# Patient Record
Sex: FEMALE | Race: WHITE | HISPANIC OR LATINO | Employment: FULL TIME | URBAN - METROPOLITAN AREA
[De-identification: names, ages, dates, MRNs, and addresses within clinical notes are randomized per-mention and may not be internally consistent; named-entity substitution may affect disease eponyms.]

---

## 2017-01-04 ENCOUNTER — PRENATAL OFFICE VISIT (OUTPATIENT)
Dept: FAMILY MEDICINE | Facility: CLINIC | Age: 37
End: 2017-01-04
Payer: COMMERCIAL

## 2017-01-04 VITALS
SYSTOLIC BLOOD PRESSURE: 116 MMHG | OXYGEN SATURATION: 100 % | WEIGHT: 222.9 LBS | DIASTOLIC BLOOD PRESSURE: 62 MMHG | HEART RATE: 88 BPM | HEIGHT: 61 IN | BODY MASS INDEX: 42.09 KG/M2 | TEMPERATURE: 97.8 F

## 2017-01-04 DIAGNOSIS — Z34.93 NORMAL PREGNANCY, THIRD TRIMESTER: Primary | ICD-10-CM

## 2017-01-04 PROCEDURE — 99207 ZZC PRENATAL VISIT: CPT | Performed by: FAMILY MEDICINE

## 2017-01-04 NOTE — NURSING NOTE
"Chief Complaint   Patient presents with     Prenatal Care     /62 mmHg  Pulse 88  Temp(Src) 97.8  F (36.6  C) (Oral)  Ht 5' 1\" (1.549 m)  Wt 222 lb 14.4 oz (101.107 kg)  BMI 42.14 kg/m2  SpO2 100%  LMP 05/08/2016 (LMP Unknown) Estimated body mass index is 42.14 kg/(m^2) as calculated from the following:    Height as of this encounter: 5' 1\" (1.549 m).    Weight as of this encounter: 222 lb 14.4 oz (101.107 kg).  BP completed using cuff size: regular       Health Maintenance due pending provider review:  NONE    n/a    Dahlia Salas CMA    "

## 2017-01-04 NOTE — PROGRESS NOTES
34w3d  Pt doing well - no concerns  Few intermittent contractions but nothing regular.  No SROM or vaginal discharge.  Good fetal movement.  F/u 2 weeks with CNM - due for GBS at that visit  Pt interested in TOLAC -   PN

## 2017-01-16 ENCOUNTER — PRENATAL OFFICE VISIT (OUTPATIENT)
Dept: MIDWIFE SERVICES | Facility: CLINIC | Age: 37
End: 2017-01-16
Payer: COMMERCIAL

## 2017-01-16 VITALS
SYSTOLIC BLOOD PRESSURE: 116 MMHG | DIASTOLIC BLOOD PRESSURE: 71 MMHG | HEIGHT: 61 IN | HEART RATE: 85 BPM | TEMPERATURE: 97.8 F | BODY MASS INDEX: 42.67 KG/M2 | RESPIRATION RATE: 20 BRPM | WEIGHT: 226 LBS

## 2017-01-16 DIAGNOSIS — O26.893 RH NEGATIVE STATUS DURING PREGNANCY, THIRD TRIMESTER: ICD-10-CM

## 2017-01-16 DIAGNOSIS — Z67.91 RH NEGATIVE STATUS DURING PREGNANCY, THIRD TRIMESTER: ICD-10-CM

## 2017-01-16 DIAGNOSIS — Z34.83 OTHER NORMAL PREGNANCY, NOT FIRST, THIRD TRIMESTER: Primary | ICD-10-CM

## 2017-01-16 LAB — HGB BLD-MCNC: 10.9 G/DL (ref 11.7–15.7)

## 2017-01-16 PROCEDURE — 00000218 ZZHCL STATISTIC OBHBG - HEMOGLOBIN: Performed by: ADVANCED PRACTICE MIDWIFE

## 2017-01-16 PROCEDURE — 99207 ZZC PRENATAL VISIT: CPT | Performed by: ADVANCED PRACTICE MIDWIFE

## 2017-01-16 PROCEDURE — 36415 COLL VENOUS BLD VENIPUNCTURE: CPT | Performed by: ADVANCED PRACTICE MIDWIFE

## 2017-01-16 PROCEDURE — 87653 STREP B DNA AMP PROBE: CPT | Performed by: ADVANCED PRACTICE MIDWIFE

## 2017-01-16 NOTE — NURSING NOTE
"Chief Complaint   Patient presents with     Prenatal Care       Initial /71 mmHg  Pulse 85  Temp(Src) 97.8  F (36.6  C)  Resp 20  Ht 5' 1\" (1.549 m)  Wt 226 lb (102.513 kg)  BMI 42.72 kg/m2  LMP 05/08/2016 (LMP Unknown) Estimated body mass index is 42.72 kg/(m^2) as calculated from the following:    Height as of this encounter: 5' 1\" (1.549 m).    Weight as of this encounter: 226 lb (102.513 kg).  BP completed using cuff size: large    "

## 2017-01-16 NOTE — PROGRESS NOTES
36w1d  GBS and hgb today. Feels good. Reports good fetal movement. Denies leaking of fluid, vaginal bleeding, regular uterine contractions, headache or other concerns.  Has repeat  planned at 41 wks. Hoping to labor prior to that and have vaginal birth. RTC in 1 wk [facility]

## 2017-01-17 ENCOUNTER — MYC MEDICAL ADVICE (OUTPATIENT)
Dept: FAMILY MEDICINE | Facility: CLINIC | Age: 37
End: 2017-01-17

## 2017-01-17 DIAGNOSIS — Z78.9 EXCLUSIVELY BREASTFEED INFANT: Primary | ICD-10-CM

## 2017-01-17 LAB
GP B STREP DNA SPEC QL NAA+PROBE: NORMAL
SPECIMEN SOURCE: NORMAL

## 2017-01-17 NOTE — PROGRESS NOTES
Quick Note:    Dear Ramya,    Your test results are attached below. Your GBS test was negative for infection this time around, which means you do not need antibiotics in labor. However, you will still need an IV because of her previous  birth.      Your hemoglobin test shows that your level is low, but just a little (normal is 11.0 and yours is 10.9). This means that you have anemia or low iron. Some common symptoms of anemia include feeling tired, being intolerant to activities that you once did easily, feeling short of breath with activity or feeling like your heart is beating faster than normal. It is important to try to increase this level before the birth of your baby.     We recommend eating more foods that are rich in iron. You can look at your food labels to see which foods have the most iron in them and eat more of these foods. Foods that are typically high in iron include red meats, dark green leafy vegetables, beans, and fortified cereals. Cream of wheat hot cereal actually has one of the highest iron contents of any food. If you are unable to increase your hemoglobin with diet or if your level is very low we will recommend that you take an iron supplement every day. You can buy these at any pharmacy or drug store.   If you have any questions, please contact me via [employer] or you can call our office at [phone].    Nanda Amaro CNM, CLARA  ______

## 2017-01-18 NOTE — TELEPHONE ENCOUNTER
PN,  Please see below- sorry i am not sure which order to pended or where to send it.  Please advise.  Thanks,  Destinee Méndez RN

## 2017-01-23 ENCOUNTER — PRENATAL OFFICE VISIT (OUTPATIENT)
Dept: MIDWIFE SERVICES | Facility: CLINIC | Age: 37
End: 2017-01-23
Payer: COMMERCIAL

## 2017-01-23 VITALS
TEMPERATURE: 98.2 F | HEART RATE: 97 BPM | BODY MASS INDEX: 43.05 KG/M2 | HEIGHT: 61 IN | SYSTOLIC BLOOD PRESSURE: 114 MMHG | RESPIRATION RATE: 20 BRPM | DIASTOLIC BLOOD PRESSURE: 77 MMHG | WEIGHT: 228 LBS

## 2017-01-23 DIAGNOSIS — Z34.93 NORMAL PREGNANCY, THIRD TRIMESTER: Primary | ICD-10-CM

## 2017-01-23 PROCEDURE — 99207 ZZC PRENATAL VISIT: CPT | Performed by: ADVANCED PRACTICE MIDWIFE

## 2017-01-23 NOTE — PROGRESS NOTES
37w1d  Feeling well. No questions or concerns. Reports good fetal movement. Denies leaking of fluid, vaginal bleeding, regular uterine contractions, headache or other concerns.  RTC in 1 wk [facility]

## 2017-01-23 NOTE — NURSING NOTE
"Chief Complaint   Patient presents with     Prenatal Care       Initial /77 mmHg  Pulse 97  Temp(Src) 98.2  F (36.8  C)  Resp 20  Ht 5' 1\" (1.549 m)  Wt 228 lb (103.42 kg)  BMI 43.10 kg/m2  LMP 05/08/2016 (LMP Unknown) Estimated body mass index is 43.1 kg/(m^2) as calculated from the following:    Height as of this encounter: 5' 1\" (1.549 m).    Weight as of this encounter: 228 lb (103.42 kg).  BP completed using cuff size: large    "

## 2017-01-30 ENCOUNTER — PRENATAL OFFICE VISIT (OUTPATIENT)
Dept: MIDWIFE SERVICES | Facility: CLINIC | Age: 37
End: 2017-01-30
Payer: COMMERCIAL

## 2017-01-30 VITALS
HEART RATE: 100 BPM | TEMPERATURE: 97.8 F | BODY MASS INDEX: 43.35 KG/M2 | SYSTOLIC BLOOD PRESSURE: 130 MMHG | WEIGHT: 229.3 LBS | DIASTOLIC BLOOD PRESSURE: 88 MMHG

## 2017-01-30 DIAGNOSIS — Z98.891 S/P CESAREAN SECTION: ICD-10-CM

## 2017-01-30 DIAGNOSIS — Z34.93 NORMAL PREGNANCY, THIRD TRIMESTER: ICD-10-CM

## 2017-01-30 DIAGNOSIS — E66.09 NON MORBID OBESITY DUE TO EXCESS CALORIES: Primary | ICD-10-CM

## 2017-01-30 PROCEDURE — 99207 ZZC PRENATAL VISIT: CPT | Performed by: ADVANCED PRACTICE MIDWIFE

## 2017-01-30 NOTE — PROGRESS NOTES
Feeling well.  Baby is active. Denies any leaking of fluid, vaginal bleeding, regular uterine contractions, or headaches or other concerns.   Cervical exam done by consent.    Presentation confirmed with BSUS.  Back on on maternal left.  FHR heard the strongest above maternal umbilicus.    Reviewed to call [phone] for contractions, loss of fluid, vaginal bleeding, decreased fetal movement or any other questions or concerns.    RTC in 1 weeks.  Pooja Malcolm, KEERTHI, APRN, CNM

## 2017-02-06 ENCOUNTER — PRENATAL OFFICE VISIT (OUTPATIENT)
Dept: MIDWIFE SERVICES | Facility: CLINIC | Age: 37
End: 2017-02-06
Payer: COMMERCIAL

## 2017-02-06 VITALS
HEART RATE: 130 BPM | WEIGHT: 228 LBS | BODY MASS INDEX: 43.1 KG/M2 | DIASTOLIC BLOOD PRESSURE: 80 MMHG | SYSTOLIC BLOOD PRESSURE: 118 MMHG

## 2017-02-06 DIAGNOSIS — Z34.93 NORMAL PREGNANCY, THIRD TRIMESTER: ICD-10-CM

## 2017-02-06 DIAGNOSIS — Z34.83 OTHER NORMAL PREGNANCY, NOT FIRST, THIRD TRIMESTER: Primary | ICD-10-CM

## 2017-02-06 PROCEDURE — 99207 ZZC PRENATAL VISIT: CPT | Performed by: ADVANCED PRACTICE MIDWIFE

## 2017-02-06 NOTE — PROGRESS NOTES
39w1d  Feeling well. Really wants to labor. Pleased with cervical change over the last week. Reports good fetal movement. Denies leaking of fluid, vaginal bleeding, regular uterine contractions, headache or other concerns.  RTC in 1 wk [facility]

## 2017-02-11 ENCOUNTER — ANESTHESIA (OUTPATIENT)
Dept: OBGYN | Facility: CLINIC | Age: 37
End: 2017-02-11
Payer: COMMERCIAL

## 2017-02-11 ENCOUNTER — HOSPITAL ENCOUNTER (INPATIENT)
Facility: CLINIC | Age: 37
LOS: 2 days | Discharge: HOME | End: 2017-02-13
Attending: ADVANCED PRACTICE MIDWIFE | Admitting: OBSTETRICS & GYNECOLOGY
Payer: COMMERCIAL

## 2017-02-11 ENCOUNTER — ANESTHESIA EVENT (OUTPATIENT)
Dept: OBGYN | Facility: CLINIC | Age: 37
End: 2017-02-11
Payer: COMMERCIAL

## 2017-02-11 DIAGNOSIS — Z98.891 S/P CESAREAN SECTION: Primary | ICD-10-CM

## 2017-02-11 PROBLEM — Z36.89 ENCOUNTER FOR TRIAGE IN PREGNANT PATIENT: Status: RESOLVED | Noted: 2017-02-11 | Resolved: 2017-02-11

## 2017-02-11 PROBLEM — Z36.89 ENCOUNTER FOR TRIAGE IN PREGNANT PATIENT: Status: ACTIVE | Noted: 2017-02-11

## 2017-02-11 LAB
ALBUMIN UR-MCNC: 10 MG/DL
APPEARANCE UR: CLEAR
BASOPHILS # BLD AUTO: 0 10E9/L (ref 0–0.2)
BASOPHILS NFR BLD AUTO: 0.1 %
BILIRUB UR QL STRIP: NEGATIVE
COLOR UR AUTO: YELLOW
DIFFERENTIAL METHOD BLD: ABNORMAL
EOSINOPHIL # BLD AUTO: 0.1 10E9/L (ref 0–0.7)
EOSINOPHIL NFR BLD AUTO: 0.3 %
ERYTHROCYTE [DISTWIDTH] IN BLOOD BY AUTOMATED COUNT: 14.8 % (ref 10–15)
GLUCOSE UR STRIP-MCNC: NEGATIVE MG/DL
HCT VFR BLD AUTO: 34.7 % (ref 35–47)
HGB BLD-MCNC: 11.2 G/DL (ref 11.7–15.7)
HGB UR QL STRIP: NEGATIVE
IMM GRANULOCYTES # BLD: 0.1 10E9/L (ref 0–0.4)
IMM GRANULOCYTES NFR BLD: 0.6 %
KETONES UR STRIP-MCNC: NEGATIVE MG/DL
LEUKOCYTE ESTERASE UR QL STRIP: NEGATIVE
LYMPHOCYTES # BLD AUTO: 2.4 10E9/L (ref 0.8–5.3)
LYMPHOCYTES NFR BLD AUTO: 13.5 %
MCH RBC QN AUTO: 25.9 PG (ref 26.5–33)
MCHC RBC AUTO-ENTMCNC: 32.3 G/DL (ref 31.5–36.5)
MCV RBC AUTO: 80 FL (ref 78–100)
MONOCYTES # BLD AUTO: 0.8 10E9/L (ref 0–1.3)
MONOCYTES NFR BLD AUTO: 4.6 %
MUCOUS THREADS #/AREA URNS LPF: PRESENT /LPF
NEUTROPHILS # BLD AUTO: 14.6 10E9/L (ref 1.6–8.3)
NEUTROPHILS NFR BLD AUTO: 80.9 %
NITRATE UR QL: NEGATIVE
NRBC # BLD AUTO: 0 10*3/UL
NRBC BLD AUTO-RTO: 0 /100
PH UR STRIP: 6.5 PH (ref 5–7)
PLATELET # BLD AUTO: 259 10E9/L (ref 150–450)
RBC # BLD AUTO: 4.33 10E12/L (ref 3.8–5.2)
RBC #/AREA URNS AUTO: <1 /HPF (ref 0–2)
SP GR UR STRIP: 1.01 (ref 1–1.03)
SQUAMOUS #/AREA URNS AUTO: 1 /HPF (ref 0–1)
T PALLIDUM IGG+IGM SER QL: NORMAL
URN SPEC COLLECT METH UR: ABNORMAL
UROBILINOGEN UR STRIP-MCNC: NORMAL MG/DL (ref 0–2)
WBC # BLD AUTO: 18 10E9/L (ref 4–11)
WBC #/AREA URNS AUTO: 1 /HPF (ref 0–2)

## 2017-02-11 PROCEDURE — 86850 RBC ANTIBODY SCREEN: CPT | Performed by: ADVANCED PRACTICE MIDWIFE

## 2017-02-11 PROCEDURE — 27210794 ZZH OR GENERAL SUPPLY STERILE: Performed by: OBSTETRICS & GYNECOLOGY

## 2017-02-11 PROCEDURE — 71000014 ZZH RECOVERY PHASE 1 LEVEL 2 FIRST HR: Performed by: OBSTETRICS & GYNECOLOGY

## 2017-02-11 PROCEDURE — 40000170 ZZH STATISTIC PRE-PROCEDURE ASSESSMENT II: Performed by: OBSTETRICS & GYNECOLOGY

## 2017-02-11 PROCEDURE — 36000057 ZZH SURGERY LEVEL 3 1ST 30 MIN - UMMC: Performed by: OBSTETRICS & GYNECOLOGY

## 2017-02-11 PROCEDURE — 37000008 ZZH ANESTHESIA TECHNICAL FEE, 1ST 30 MIN: Performed by: OBSTETRICS & GYNECOLOGY

## 2017-02-11 PROCEDURE — 25000128 H RX IP 250 OP 636: Performed by: OBSTETRICS & GYNECOLOGY

## 2017-02-11 PROCEDURE — 99215 OFFICE O/P EST HI 40 MIN: CPT | Mod: 25

## 2017-02-11 PROCEDURE — 25000128 H RX IP 250 OP 636: Performed by: NURSE ANESTHETIST, CERTIFIED REGISTERED

## 2017-02-11 PROCEDURE — 25000132 ZZH RX MED GY IP 250 OP 250 PS 637: Performed by: OBSTETRICS & GYNECOLOGY

## 2017-02-11 PROCEDURE — 25000128 H RX IP 250 OP 636: Performed by: ADVANCED PRACTICE MIDWIFE

## 2017-02-11 PROCEDURE — 25000125 ZZHC RX 250: Performed by: ANESTHESIOLOGY

## 2017-02-11 PROCEDURE — C1765 ADHESION BARRIER: HCPCS | Performed by: OBSTETRICS & GYNECOLOGY

## 2017-02-11 PROCEDURE — 25000125 ZZHC RX 250: Performed by: NURSE ANESTHETIST, CERTIFIED REGISTERED

## 2017-02-11 PROCEDURE — 86870 RBC ANTIBODY IDENTIFICATION: CPT | Performed by: ADVANCED PRACTICE MIDWIFE

## 2017-02-11 PROCEDURE — 25000125 ZZHC RX 250

## 2017-02-11 PROCEDURE — 40000010 ZZH STATISTIC ANES STAT CODE-CRNA PER MINUTE: Performed by: OBSTETRICS & GYNECOLOGY

## 2017-02-11 PROCEDURE — 85025 COMPLETE CBC W/AUTO DIFF WBC: CPT | Performed by: ADVANCED PRACTICE MIDWIFE

## 2017-02-11 PROCEDURE — 86901 BLOOD TYPING SEROLOGIC RH(D): CPT | Performed by: ADVANCED PRACTICE MIDWIFE

## 2017-02-11 PROCEDURE — 59025 FETAL NON-STRESS TEST: CPT

## 2017-02-11 PROCEDURE — 71000015 ZZH RECOVERY PHASE 1 LEVEL 2 EA ADDTL HR: Performed by: OBSTETRICS & GYNECOLOGY

## 2017-02-11 PROCEDURE — 37000009 ZZH ANESTHESIA TECHNICAL FEE, EACH ADDTL 15 MIN: Performed by: OBSTETRICS & GYNECOLOGY

## 2017-02-11 PROCEDURE — 27110028 ZZH OR GENERAL SUPPLY NON-STERILE: Performed by: OBSTETRICS & GYNECOLOGY

## 2017-02-11 PROCEDURE — 86900 BLOOD TYPING SEROLOGIC ABO: CPT | Performed by: ADVANCED PRACTICE MIDWIFE

## 2017-02-11 PROCEDURE — 25000125 ZZHC RX 250: Performed by: ADVANCED PRACTICE MIDWIFE

## 2017-02-11 PROCEDURE — 12000030 ZZH R&B OB INTERMEDIATE UMMC

## 2017-02-11 PROCEDURE — 25800025 ZZH RX 258: Performed by: ADVANCED PRACTICE MIDWIFE

## 2017-02-11 PROCEDURE — 81001 URINALYSIS AUTO W/SCOPE: CPT | Performed by: ADVANCED PRACTICE MIDWIFE

## 2017-02-11 PROCEDURE — 86780 TREPONEMA PALLIDUM: CPT | Performed by: ADVANCED PRACTICE MIDWIFE

## 2017-02-11 PROCEDURE — 36000059 ZZH SURGERY LEVEL 3 EA 15 ADDTL MIN UMMC: Performed by: OBSTETRICS & GYNECOLOGY

## 2017-02-11 RX ORDER — NALBUPHINE HYDROCHLORIDE 10 MG/ML
2.5-5 INJECTION, SOLUTION INTRAMUSCULAR; INTRAVENOUS; SUBCUTANEOUS EVERY 6 HOURS PRN
Status: DISCONTINUED | OUTPATIENT
Start: 2017-02-11 | End: 2017-02-12

## 2017-02-11 RX ORDER — CEFAZOLIN SODIUM 2 G/100ML
2 INJECTION, SOLUTION INTRAVENOUS
Status: DISCONTINUED | OUTPATIENT
Start: 2017-02-11 | End: 2017-02-11 | Stop reason: HOSPADM

## 2017-02-11 RX ORDER — SODIUM CHLORIDE, SODIUM LACTATE, POTASSIUM CHLORIDE, CALCIUM CHLORIDE 600; 310; 30; 20 MG/100ML; MG/100ML; MG/100ML; MG/100ML
INJECTION, SOLUTION INTRAVENOUS CONTINUOUS
Status: DISCONTINUED | OUTPATIENT
Start: 2017-02-11 | End: 2017-02-11 | Stop reason: HOSPADM

## 2017-02-11 RX ORDER — EPHEDRINE SULFATE 50 MG/ML
5 INJECTION, SOLUTION INTRAMUSCULAR; INTRAVENOUS; SUBCUTANEOUS
Status: DISCONTINUED | OUTPATIENT
Start: 2017-02-11 | End: 2017-02-12

## 2017-02-11 RX ORDER — METHYLERGONOVINE MALEATE 0.2 MG/ML
200 INJECTION INTRAVENOUS
Status: DISCONTINUED | OUTPATIENT
Start: 2017-02-11 | End: 2017-02-12

## 2017-02-11 RX ORDER — FENTANYL CITRATE 50 UG/ML
25-50 INJECTION, SOLUTION INTRAMUSCULAR; INTRAVENOUS
Status: DISCONTINUED | OUTPATIENT
Start: 2017-02-11 | End: 2017-02-11 | Stop reason: HOSPADM

## 2017-02-11 RX ORDER — CEFAZOLIN SODIUM 1 G/3ML
1 INJECTION, POWDER, FOR SOLUTION INTRAMUSCULAR; INTRAVENOUS
Status: DISCONTINUED | OUTPATIENT
Start: 2017-02-11 | End: 2017-02-11 | Stop reason: HOSPADM

## 2017-02-11 RX ORDER — BUPIVACAINE HYDROCHLORIDE 7.5 MG/ML
INJECTION, SOLUTION INTRASPINAL PRN
Status: DISCONTINUED | OUTPATIENT
Start: 2017-02-11 | End: 2017-02-11

## 2017-02-11 RX ORDER — NALOXONE HYDROCHLORIDE 0.4 MG/ML
.1-.4 INJECTION, SOLUTION INTRAMUSCULAR; INTRAVENOUS; SUBCUTANEOUS
Status: DISCONTINUED | OUTPATIENT
Start: 2017-02-11 | End: 2017-02-11

## 2017-02-11 RX ORDER — CARBOPROST TROMETHAMINE 250 UG/ML
250 INJECTION, SOLUTION INTRAMUSCULAR
Status: DISCONTINUED | OUTPATIENT
Start: 2017-02-11 | End: 2017-02-12

## 2017-02-11 RX ORDER — ONDANSETRON 4 MG/1
4 TABLET, ORALLY DISINTEGRATING ORAL EVERY 30 MIN PRN
Status: DISCONTINUED | OUTPATIENT
Start: 2017-02-11 | End: 2017-02-11 | Stop reason: HOSPADM

## 2017-02-11 RX ORDER — OXYTOCIN/0.9 % SODIUM CHLORIDE 30/500 ML
PLASTIC BAG, INJECTION (ML) INTRAVENOUS
Status: DISCONTINUED
Start: 2017-02-11 | End: 2017-02-11 | Stop reason: WASHOUT

## 2017-02-11 RX ORDER — OXYTOCIN 10 [USP'U]/ML
INJECTION, SOLUTION INTRAMUSCULAR; INTRAVENOUS
Status: DISCONTINUED
Start: 2017-02-11 | End: 2017-02-11 | Stop reason: HOSPADM

## 2017-02-11 RX ORDER — OXYTOCIN/0.9 % SODIUM CHLORIDE 30/500 ML
PLASTIC BAG, INJECTION (ML) INTRAVENOUS
Status: DISCONTINUED
Start: 2017-02-11 | End: 2017-02-11 | Stop reason: HOSPADM

## 2017-02-11 RX ORDER — NALOXONE HYDROCHLORIDE 0.4 MG/ML
.1-.4 INJECTION, SOLUTION INTRAMUSCULAR; INTRAVENOUS; SUBCUTANEOUS
Status: DISCONTINUED | OUTPATIENT
Start: 2017-02-11 | End: 2017-02-12

## 2017-02-11 RX ORDER — ONDANSETRON 2 MG/ML
4 INJECTION INTRAMUSCULAR; INTRAVENOUS EVERY 6 HOURS PRN
Status: DISCONTINUED | OUTPATIENT
Start: 2017-02-11 | End: 2017-02-12

## 2017-02-11 RX ORDER — OXYCODONE AND ACETAMINOPHEN 5; 325 MG/1; MG/1
1 TABLET ORAL
Status: DISCONTINUED | OUTPATIENT
Start: 2017-02-11 | End: 2017-02-12

## 2017-02-11 RX ORDER — LIDOCAINE HYDROCHLORIDE AND EPINEPHRINE 15; 5 MG/ML; UG/ML
INJECTION, SOLUTION EPIDURAL PRN
Status: DISCONTINUED | OUTPATIENT
Start: 2017-02-11 | End: 2017-02-11

## 2017-02-11 RX ORDER — OXYTOCIN 10 [USP'U]/ML
10 INJECTION, SOLUTION INTRAMUSCULAR; INTRAVENOUS
Status: DISCONTINUED | OUTPATIENT
Start: 2017-02-11 | End: 2017-02-12

## 2017-02-11 RX ORDER — IBUPROFEN 800 MG/1
800 TABLET, FILM COATED ORAL
Status: DISCONTINUED | OUTPATIENT
Start: 2017-02-11 | End: 2017-02-12

## 2017-02-11 RX ORDER — ACETAMINOPHEN 325 MG/1
650 TABLET ORAL EVERY 4 HOURS PRN
Status: DISCONTINUED | OUTPATIENT
Start: 2017-02-11 | End: 2017-02-12

## 2017-02-11 RX ORDER — LIDOCAINE 40 MG/G
CREAM TOPICAL
Status: DISCONTINUED | OUTPATIENT
Start: 2017-02-11 | End: 2017-02-12

## 2017-02-11 RX ORDER — HYDROMORPHONE HYDROCHLORIDE 1 MG/ML
.3-.5 INJECTION, SOLUTION INTRAMUSCULAR; INTRAVENOUS; SUBCUTANEOUS EVERY 5 MIN PRN
Status: DISCONTINUED | OUTPATIENT
Start: 2017-02-11 | End: 2017-02-11 | Stop reason: HOSPADM

## 2017-02-11 RX ORDER — KETOROLAC TROMETHAMINE 30 MG/ML
INJECTION, SOLUTION INTRAMUSCULAR; INTRAVENOUS PRN
Status: DISCONTINUED | OUTPATIENT
Start: 2017-02-11 | End: 2017-02-11

## 2017-02-11 RX ORDER — SODIUM CHLORIDE, SODIUM LACTATE, POTASSIUM CHLORIDE, CALCIUM CHLORIDE 600; 310; 30; 20 MG/100ML; MG/100ML; MG/100ML; MG/100ML
INJECTION, SOLUTION INTRAVENOUS CONTINUOUS
Status: DISCONTINUED | OUTPATIENT
Start: 2017-02-11 | End: 2017-02-12

## 2017-02-11 RX ORDER — OXYTOCIN/0.9 % SODIUM CHLORIDE 30/500 ML
100-340 PLASTIC BAG, INJECTION (ML) INTRAVENOUS CONTINUOUS PRN
Status: COMPLETED | OUTPATIENT
Start: 2017-02-11 | End: 2017-02-11

## 2017-02-11 RX ORDER — ONDANSETRON 2 MG/ML
4 INJECTION INTRAMUSCULAR; INTRAVENOUS EVERY 30 MIN PRN
Status: DISCONTINUED | OUTPATIENT
Start: 2017-02-11 | End: 2017-02-11 | Stop reason: HOSPADM

## 2017-02-11 RX ADMIN — KETOROLAC TROMETHAMINE 30 MG: 30 INJECTION, SOLUTION INTRAMUSCULAR at 21:08

## 2017-02-11 RX ADMIN — SODIUM CHLORIDE, POTASSIUM CHLORIDE, SODIUM LACTATE AND CALCIUM CHLORIDE: 600; 310; 30; 20 INJECTION, SOLUTION INTRAVENOUS at 08:49

## 2017-02-11 RX ADMIN — CEFAZOLIN 2 G: 1 INJECTION, POWDER, FOR SOLUTION INTRAMUSCULAR; INTRAVENOUS at 20:19

## 2017-02-11 RX ADMIN — LIDOCAINE HYDROCHLORIDE,EPINEPHRINE BITARTRATE 3 ML: 15; .005 INJECTION, SOLUTION EPIDURAL; INFILTRATION; INTRACAUDAL; PERINEURAL at 07:15

## 2017-02-11 RX ADMIN — PHENYLEPHRINE HYDROCHLORIDE 100 MCG: 10 INJECTION, SOLUTION INTRAMUSCULAR; INTRAVENOUS; SUBCUTANEOUS at 20:22

## 2017-02-11 RX ADMIN — ONDANSETRON 4 MG: 2 INJECTION INTRAMUSCULAR; INTRAVENOUS at 20:16

## 2017-02-11 RX ADMIN — OXYTOCIN-SODIUM CHLORIDE 0.9% IV SOLN 30 UNIT/500ML 300 ML/HR: 30-0.9/5 SOLUTION at 20:43

## 2017-02-11 RX ADMIN — Medication 0.3 MCG/KG/MIN: at 20:23

## 2017-02-11 RX ADMIN — SODIUM CHLORIDE, POTASSIUM CHLORIDE, SODIUM LACTATE AND CALCIUM CHLORIDE 500 ML: 600; 310; 30; 20 INJECTION, SOLUTION INTRAVENOUS at 16:25

## 2017-02-11 RX ADMIN — SODIUM CITRATE AND CITRIC ACID MONOHYDRATE 30 ML: 500; 334 SOLUTION ORAL at 18:44

## 2017-02-11 RX ADMIN — Medication 8 ML: at 07:19

## 2017-02-11 RX ADMIN — BUPIVACAINE HYDROCHLORIDE IN DEXTROSE 1.6 ML: 7.5 INJECTION, SOLUTION SUBARACHNOID at 20:20

## 2017-02-11 RX ADMIN — SODIUM CHLORIDE, POTASSIUM CHLORIDE, SODIUM LACTATE AND CALCIUM CHLORIDE 500 ML: 600; 310; 30; 20 INJECTION, SOLUTION INTRAVENOUS at 06:52

## 2017-02-11 NOTE — IP AVS SNAPSHOT
MRN:[id number]                      After Visit Summary   2/11/2017    Ramya BALBUENA    MRN: [id number]           Thank you!     Thank you for choosing [facility] for your care. Our goal is always to provide you with excellent care. Hearing back from our patients is one way we can continue to improve our services. Please take a few minutes to complete the written survey that you may receive in the mail after you visit with us. Thank you!        Patient Information     Date Of Birth          1980        About your hospital stay     You were [facility] on:  February 11[facility] 2017 [facility]    You were discharged on:  February 13, 2017        Reason for your hospital stay       You were admitted to the hospital for the birth of your baby and postpartum care.                  Who to Call     For medical emergencies, please call 911.  For non-urgent questions about your medical care, please call your primary care provider or clinic, [phone]  For questions related to your surgery, please call your surgery clinic        Attending Provider     Provider Specialty    Pooja Malcolm, CLARA Midwives    Deyanira Moreno APRN CN Midwives    Diane Anderson MD OB/Gyn       Primary Care Provider Office Phone # Fax #    Naida CANNON DO Lexi [phone]       [address] [address]        After Care Instructions     Activity       Discharge activity:  No intercourse and nothing in the vagina for 6 weeks  Continue walking and moving around frequently, increase activity as tolerated. No strenuous exercise for 6 weeks.   No driving for 2 weeks or until you can slam on the brakes with minimal discomfort. No driving while on narcotic pain medication.  Continue stool softeners while taking narcotic pain medications.            Diet       Regular                  Follow-up Appointments     [facility] Follow-up  and recommended labs and tests       Schedule postpartum visit with your provider and return to clinic in 6 weeks.                  Further instructions from your care team       Postop  Birth Instructions    Activity       Do not lift more than 10 pounds for 6 weeks after surgery.  Ask family and friends for help when you need it.    No driving until you have stopped taking your pain medications (usually two weeks after surgery).    No heavy exercise or activity for 6 weeks.  Don't do anything that will put a strain on your surgery site.    Don't strain when using the toilet.  Your care team may prescribe a stool softener if you have problems with your bowel movements.     To care for your incision:       Keep the incision clean and dry.    Do not soak your incision in water. No swimming or hot tubs until it has fully healed. You may soak in the bathtub if the water level is below your incision.    Do not use peroxide, gel, cream, lotion, or ointment on your incision.    Adjust your clothes to avoid pressure on your surgery site (check the elastic in your underwear for example).     You may see a small amount of clear or pink drainage and this is normal.  Check with your health care provider:       If the drainage increases or has an odor.    If the incision reddens, you have swelling, or develop a rash.    If you have increased pain and the medicine we prescribed doesn't help.    If you have a fever above 100.4 F (38 C) with or without chills when placing thermometer under your tongue.   The area around your incision (surgery wound), will feel numb.  This is normal. The numbness should go away in less than a year.     Keep your hands clean:  Always wash your hands before touching your incision (surgery wound). This helps reduce your risk of infection. If your hands aren't dirty, you may use an alcohol hand-rub to clean your hands. Keep your nails clean and short.    Call your healthcare provider if you  "have any of these symptoms:       You soak a sanitary pad with blood within 1 hour, or you see blood clots larger than a golf ball.    Bleeding that lasts more than 6 weeks.    Vaginal discharge that smells bad.    Severe pain, cramping or tenderness in your lower belly area.    A need to urinate more frequently (use the toilet more often), more urgently (use the toilet very quickly), or it burns when you urinate.    Nausea and vomiting.    Redness, swelling or pain around a vein in your leg.    Problems breastfeeding or a red or painful area on your breast.    Chest pain and cough or are gasping for air.    Problems with coping with sadness, anxiety or depression. If you have concerns about hurting yourself or the baby, call your provider immediately.      You have questions or concerns after you return home.                  Pending Results     Date and Time Order Name Status Description    2/12/2017 0630 Rh Immune Globulin Study In process             Statement of Approval     Ordered          02/13/17 1241  I have reviewed and agree with all the recommendations and orders detailed in this document.  EFFECTIVE NOW     Approved and electronically signed by:  Yadi Ricci MD             Admission Information     Date & Time Provider Department Dept. Phone    2/11/2017 Diane Anderson MD [facility] [phone]      Your Vitals Were     Blood Pressure Pulse Temperature Respirations Height Weight    112/70 78 98  F (36.7  C) (Oral) 18 1.549 m (5' 1\") 103.4 kg (228 lb)    Last Period Pulse Oximetry BMI (Body Mass Index)             05/08/2016 (LMP Unknown) 99% 43.08 kg/m2         [facility] Information     [email or web address] gives you secure access to your electronic health record. If you see a primary care provider, you can also send messages to your care team and make appointments. If you have questions, please call your primary care clinic.  If you do not have a primary care provider, please call [phone] " and they will assist you.        Care EveryWhere ID     This is your Care EveryWhere ID. This could be used by other organizations to access your [facility] medical records  [id number]           Review of your medicines      START taking        Dose / Directions    oxyCODONE 5 MG IR tablet   Commonly known as:  ROXICODONE        Dose:  5-10 mg   Take 1-2 tablets (5-10 mg) by mouth every 4 hours as needed for moderate to severe pain   Quantity:  30 tablet   Refills:  0       senna-docusate 8.6-50 MG per tablet   Commonly known as:  SENOKOT-S;PERICOLACE        Dose:  1-2 tablet   Take 1-2 tablets by mouth 2 times daily   Quantity:  100 tablet   Refills:  0       simethicone 80 MG chewable tablet   Commonly known as:  MYLICON        Dose:  80 mg   Take 1 tablet (80 mg) by mouth 4 times daily as needed for other (gas)   Quantity:  60 tablet   Refills:  0         CONTINUE these medicines which have NOT CHANGED        Dose / Directions    order for DME   Used for:  Exclusively breastfeed infant        Equipment being ordered: double electric breast pump   Quantity:  1 Device   Refills:  0       PRENATAL VITAMINS PO        Refills:  0         STOP taking     SUDAFED PO           TYLENOL PO                Where to get your medicines      These medications were sent to [facility] [place], [place] - [address]  [address] [address]     Phone:  [phone]     senna-docusate 8.6-50 MG per tablet    simethicone 80 MG chewable tablet         Some of these will need a paper prescription and others can be bought over the counter. Ask your nurse if you have questions.     Bring a paper prescription for each of these medications     oxyCODONE 5 MG IR tablet                Protect others around you: Learn how to safely use, store and throw away your medicines at www.disposemymeds.org.             Medication List: This is a list of all your medications and when to take them. Check marks  below indicate your daily home schedule. Keep this list as a reference.      Medications           Morning Afternoon Evening Bedtime As Needed    order for DME   Equipment being ordered: double electric breast pump                                oxyCODONE 5 MG IR tablet   Commonly known as:  ROXICODONE   Take 1-2 tablets (5-10 mg) by mouth every 4 hours as needed for moderate to severe pain                                PRENATAL VITAMINS PO                                senna-docusate 8.6-50 MG per tablet   Commonly known as:  SENOKOT-S;PERICOLACE   Take 1-2 tablets by mouth 2 times daily   Last time this was given:  2 tablets on 2/13/2017  8:50 AM                                simethicone 80 MG chewable tablet   Commonly known as:  MYLICON   Take 1 tablet (80 mg) by mouth 4 times daily as needed for other (gas)   Last time this was given:  80 mg on 2/13/2017  8:51 AM

## 2017-02-11 NOTE — ANESTHESIA PREPROCEDURE EVALUATION
Anesthesia Evaluation       history and physical reviewed .      No history of anesthetic complications     ROS/MED HX    ENT/Pulmonary:  - neg pulmonary ROS     Neurologic:  - neg neurologic ROS     Cardiovascular:  - neg cardiovascular ROS       METS/Exercise Tolerance:     Hematologic:         Musculoskeletal:         GI/Hepatic:  - neg GI/hepatic ROS       Renal/Genitourinary:         Endo:     (+) Obesity, .      Psychiatric:         Infectious Disease:         Malignancy:         Other:               Physical Exam  Normal systems: dental    Airway   Mallampati: II  TM distance: > 3 FB  Neck ROM: full  Mouth opening: > 3 cm    Dental     Cardiovascular       Pulmonary           neg OB ROS                 HPI: Ramya BALBUENA is a  36 year old female with no significant PMH now with an IUP at 39w6d complicated by failed TOLAC, arrest of decent.     History and physical reviewed; no interval change.    Procedure: Procedure(s):   - Wound Class: I-Clean     NPO Status:   Adequate. > 6 hours.     PMHx/PSHx/ROS:  Past Medical History   Diagnosis Date     NO ACTIVE PROBLEMS      LSIL (low grade squamous intraepithelial lesion) on Pap smear 2011     colp - WNl     Abnormal Pap smear             Past Surgical History   Procedure Laterality Date     Hc exc lesion of tongue w/o closure       benign      section N/A 2014     Procedure:  SECTION;  Surgeon: Maribel Nixon MD;  Location: [facility] L+D           Anesthesia Plan      History & Physical Review      ASA Status:  2 .  OB Epidural Asa: 2       Plan for Spinal and Periph. Nerve Block for postop pain   PONV prophylaxis:  Ondansetron (or other 5HT-3)       Postoperative Care  Postoperative pain management:  IV analgesics, Peripheral nerve block (Single Shot) and Oral pain medications.      Consents  Anesthetic plan, risks, benefits and alternatives discussed with:  Patient..

## 2017-02-11 NOTE — IP AVS SNAPSHOT
[facility]    [address] [address]    Phone:  [phone]                                       After Visit Summary   2/11/2017    Ramya BALBUENA    MRN: [id number]           After Visit Summary Signature Page     I have received my discharge instructions, and my questions have been answered. I have discussed any challenges I see with this plan with the nurse or doctor.    ..........................................................................................................................................  Patient/Patient Representative Signature      ..........................................................................................................................................  Patient Representative Print Name and Relationship to Patient    ..................................................               ................................................  Date                                            Time    ..........................................................................................................................................  Reviewed by Signature/Title    ...................................................              ..............................................  Date                                                            Time

## 2017-02-11 NOTE — PROGRESS NOTES
"S: Patient is doing well. She is feeling good with the epidural. Feeling pressure occasionally but not constant and bearable. Will let us know if pressure increases. No concerns at this time.     O:  Blood pressure 120/65, pulse 98, temperature 98.3  F (36.8  C), temperature source Oral, resp. rate 16, height 1.549 m (5' 1\"), weight 103.42 kg (228 lb), last menstrual period 2016, SpO2 99 %.  General appearance: comfortable    CONTACTIONS: Contractions every 3-7 minutes.  Palpate: moderate  FETAL HEART TONES: baseline 140 with moderate FHR variability and    accelerations yes. Decelerations yes, occasional variables but rare.    NST: REACTIVE  ROM: not ruptured  PELVIC EXAM:deferred  Fetal Position: cephalic   Bloody show: No  Pitocin- none,  Antibiotics- none  Cervical ripening: N/A    ASSESSMENT:  ==============  IUP @ 39w6d active labor   Fetal Heart rate tracing Category category one  GBS- negative     PLAN:  ===========  Comfort measures prn   Pain medication with epidural   Anticipate   Reevaluate in 2-4 hours/PRN     Deyanira Moreno CNM    "

## 2017-02-11 NOTE — PLAN OF CARE
PT. ARRIVES TO TRIAGE RM 2, WITH REPORTS OF CONTRACTIONS, SINCE 0000.  DENIES VAGINAL BLEEDING ET LEAKAGE OF FLUID.  PT. REPORTS FETAL MOVEMENT.  ALSO C/O'S DYSURIA.  PT. REQUESTING TO TOLAC.  PT. INFORMED OF PLAN OF CARE, VERBALIZES UNDERSTANDING.

## 2017-02-11 NOTE — PLAN OF CARE
Problem: Goal Outcome Summary  Goal: Goal Outcome Summary  Outcome: Therapy, progress toward functional goals as expected  VSS.  AFEBRILE.  ASSESSMENT WNL'S.  FHT'S CATERGORY I.  MEMBRANES INTACT ET NO VAGINAL BLEEDING.  PT. PLANNING TO TOLAC.  NITROUS OXIDE USED, WITH DECREASE IN PAN.   PT. CURRENTLY IN TUB.  WILL CONTINUE TO MONITOR.

## 2017-02-11 NOTE — PLAN OF CARE
Problem: Labor (Cervical Ripen, Induct, Augment) (Adult,Obstetrics,Pediatric)  Goal: Signs and Symptoms of Listed Potential Problems Will be Absent or Manageable (Labor)  Signs and symptoms of listed potential problems will be absent or manageable by discharge/transition of care (reference Labor (Cervical Ripen, Induct, Augment) (Adult,Obstetrics,Pediatric) CPG).   Outcome: No Change  Labor is not progressing as expected. See flow sheet for fetal and uterine monitoring. AROM  At 1558, meconium in amniotic fluid. Patient is comfortable, no complains. Will continue to monitor labor and notify the provider with changes.

## 2017-02-11 NOTE — PROGRESS NOTES
"S: General appearance: comfortable with epidural, states she is feeling more pressure that does not retract.  Using peanut ball.  Still having irregular contraction patter, Agreeable to AROM.      Blood pressure 112/62, pulse 98, temperature 98.4  F (36.9  C), temperature source Oral, resp. rate 16, height 1.549 m (5' 1\"), weight 103.42 kg (228 lb), last menstrual period 05/08/2016, SpO2 95 %.      CONTRACTIONS: Contractions every 2-5 minutes.  Palpate: moderate  Pitocin- none,  Antibiotics- none  FETAL HEART TONES: baseline 150 with moderate FHR variability and  pos accelerations. Having occasional variable decelerations.  ROM: light meconium fluid   PELVIC EXAM:PELVIC EXAM: 8/ 100%/ Anterior/ soft/ -1   ASSESSMENT:  ==============  IUP @ 39w6d in active labor   Fetal Heart Rate Tracing category one  GBS- negative     PLAN:  ===========  Frequent position changes to facilitate labor with epidural anesthesia.  Pain medication- Comfortable with epidural   Reevaluate in 2-4 hours prn  MD consultant on call Dr. Anderson/ available prn     Hailey Simms DNP Student, SNM working with Deyanira Moreno CNM   "

## 2017-02-11 NOTE — PROGRESS NOTES
"S: General appearance: comfortable, states she occasionally feels increased pressure with contractions.  Positioned high-fowlers to promote fetal decent.     Blood pressure 104/66, pulse 98, temperature 98.3  F (36.8  C), temperature source Oral, resp. rate 16, height 1.549 m (5' 1\"), weight 103.42 kg (228 lb), last menstrual period 05/08/2016, SpO2 99 %.    CONTRACTIONS: Contractions every 2-5 minutes.  Palpate: moderate  Pitocin- none,  Antibiotics- none  FETAL HEART TONES: baseline 145 with moderate FHR variability and  pos accelerations.  No decelerations present.    ROM: not ruptured  PELVIC EXAM:deferred  ASSESSMENT:  ==============  IUP @ 39w6d in active labor   Fetal Heart Rate Tracing category one  GBS- negative     PLAN:  ===========  Frequent position changes to facilitate labor with epidural anesthesia.  Pain medication- comfortable with epidural   Reevaluate in 2-4 hours prn  MD consultant on call Dr. Anderson/ available prn     Hailey Simms DNP Student, SNM working with Deyanira Moreno CNM   "

## 2017-02-11 NOTE — PROGRESS NOTES
"  S: She requests a check.  She is thinking about an epidural.      O:  Blood pressure 121/74, pulse 98, temperature 98.3  F (36.8  C), temperature source Oral, resp. rate 16, height 1.549 m (5' 1\"), weight 103.42 kg (228 lb), last menstrual period 2016.  General appearance: uncomfortable with contractions    CONTRACTIONS: Contractions every 2-4 minutes.  Palpate: moderate  FETAL HEART TONES: baseline 140 with moderate FHR variability and    accelerations yes. Decelerations no.    NST: REACTIVE  CST: NEGATIVE  ROM: not ruptured  PELVIC EXAM:PELVIC EXAM: 5/ 80%/ Mid/ average/ -1   Fetal Position:  Cephalic by BSUS   Bloody show: No  Pitocin- none,  Antibiotics- none  Cervical ripening: N/A  ASSESSMENT:  ==============  IUP @ 39w6d early labor and good progress   TOLAC  Fetal Heart rate tracing Category category one  GBS- negative  RH negative      PLAN:  ===========  Pain medication as desired - will prepare for an epidural for her request  Anticipate   reevaluate in 2-4 hours/PRN   Report to CLARA Ledesma CNM  "

## 2017-02-11 NOTE — ANESTHESIA PROCEDURE NOTES
Epidural Procedure Note    Staff:     Anesthesiologist:  NERY TOMPKINS  Location: OB   Pre-procedure checklist:   patient identified, IV checked, site marked, risks and benefits discussed, informed consent, monitors and equipment checked, pre-op evaluation and at physician/surgeon's request      Correct Patient: Yes      Correct Position: Yes      Correct Site: Yes      Correct Procedure: Yes      Correct Laterality:  N/A    Site Marked:  N/A  Procedure:     Procedure:  Epidural catheter    ASA:  2    Position:  Sitting    Sterile Prep: chloraprep      Insertion site:  L3-4    Local skin infiltration:  1% lidocaine    amount (mL):  2    Approach:  Midline    Needle gauge (G):  17    Needle Length (in):  3.5    Block Needle Type:  Touhy    Injection Technique:  LORT saline    Attempts:  1    Redirects:  0    Catheter gauge (G):  19    Catheter threaded easily: Yes      Threaded to cm at skin:  9    Paresthesias:  No    Aspiration negative for Heme or CSF: Yes      Test dose (mL):  3    Test dose negative for signs of intravascular, subdural or intrathecal injection: Yes      Spinal/LP Procedure Note    Spinal Block  Staff:     Anesthesiologist:  MARIA ELENA ARANA  Location: OB  Procedure Start/Stop Times:     patient identified, IV checked, site marked, risks and benefits discussed, informed consent, monitors and equipment checked, pre-op evaluation and at physician/surgeon's request      Correct Patient: Yes      Correct Position: Yes      Correct Site: Yes      Correct Procedure: Yes      Correct Laterality:  N/A    Site Marked:  N/A  Procedure:     Procedure:  Intrathecal    Sterile Prep: chloraprep, mask, sterile gloves and patient draped      Insertion site:  L3-4    Approach:  Midline    Needle Type:  Annycke    Needle gauge (G):  25    Local Skin Infiltration:  1% lidocaine    amount (ml):  3    Introducer used: Yes      Introducer gauge:  20 G    Attempts:  2    Redirects:  1    CSF:   Clear    Paresthesias:  No  Assessment/Narrative:      Injected 1.6 ml of 0.75% bupivacaine with 0.2 mg duramorph    Peripheral Nerve Block Procedure Note    Staff:     Anesthesiologist:  MARIA ELENA ARANA  Location: PACU  Procedure Start/Stop TImes:      2/11/2017 9:41 PM     2/11/2017 9:48 PM    patient identified, IV checked, site marked, risks and benefits discussed, informed consent, monitors and equipment checked, pre-op evaluation, at physician/surgeon's request and post-op pain management      Correct Patient: Yes      Correct Position: Yes      Correct Site: Yes      Correct Procedure: Yes      Correct Laterality:  Yes  Procedure details:     Procedure:  TAP    Laterality:  Bilateral    Position:  Supine    Sterile Prep: chloraprep, mask and sterile gloves      Needle:  Insulated    Needle gauge:  21    Needle length (inches):  4    Ultrasound: Yes      Ultrasound used to identify targeted nerve, plexus, or vascular structure and placed a needle adjacent to it      Permanent Image entered into patiient's record      Abnormal pain on injection: No      Blood Aspirated: No      Paresthesias:  No    Bleeding at site: No      Bolus via:  Needle    Infusion Method:  Single Shot    Complications:  None  Assessment/Narrative:      Injected a total of 20 mL of 0.25% bupivacaine with epi 1:200,000, 20 mL of exparel with 20 mL of saline.

## 2017-02-11 NOTE — PROGRESS NOTES
"S: Patient is doing well. She is comfortable with epidural. Able to feel that her bladder was full so asking for that to be emptied. Checked patient, made good cervical changes. 8/90/-2 intact with bulging bag of water. Discussed plan from this point on. Will plan to give patient another 4 hours to make cervical changes on her own. They have no questions or concerns at this time.     O:  Blood pressure 110/62, pulse 98, temperature 98.3  F (36.8  C), temperature source Oral, resp. rate 16, height 1.549 m (5' 1\"), weight 103.42 kg (228 lb), last menstrual period 2016, SpO2 99 %.  General appearance: comfortable    CONTACTIONS: Contractions every 3-6 minutes.  Palpate: moderate  FETAL HEART TONES: baseline 145 with moderate FHR variability and    accelerations yes. Decelerations yes, occasional variable but very infrequent.    NST: REACTIVE  ROM: not ruptured  PELVIC EXAM:PELVIC EXAM: / Mid/ soft/ -2   Fetal Position: cephalic   Bloody show: No  Pitocin- none,  Antibiotics- none  Cervical ripening: N/A    ASSESSMENT:  ==============  IUP @ 39w6d active labor   Fetal Heart rate tracing Category category one  GBS- negative     PLAN:  ===========  Comfort measures prn   Pain medication with epidural   Anticipate   Reevaluate in 2-4 hours/PRN     Deyanira Moreno CNM    "

## 2017-02-11 NOTE — H&P
Ramya BALBUENA is a 36 year old female,     Patient's last menstrual period was 2016 (lmp unknown)., Estimated Date of Delivery: 2017 is calculated from lmp and verified with U/S     [facility] 2017 at 3:48 AM     in early labor.  Membranes are intact    HPI: Says contractions started at midnight and have been getting stronger.      PRENATAL COURSE  Prenatal care began at 12 wks gestation for a total of 13 prenatal visits.  Total wt gain 23  Prenatal vital signs WNL  Prenatal course was essentially uncomplicated    HISTORIES  Allergies   Allergen Reactions     Cats      Pollen Extract      Ragweed - Fall  Usually also bothered for a week in the spring.     Codeine Nausea     Past Medical History   Diagnosis Date     NO ACTIVE PROBLEMS      LSIL (low grade squamous intraepithelial lesion) on Pap smear 2011     colp - WNl     Abnormal Pap smear           Past Surgical History   Procedure Laterality Date     Hc exc lesion of tongue w/o closure       benign      section N/A 2014     Procedure:  SECTION;  Surgeon: Maribel Nixon MD;  [facility]: [facility]     Family History   Problem Relation Age of Onset     HEART DISEASE Maternal Grandfather      Allergies Mother      Eye Disorder Mother      Allergies Maternal Grandmother      Depression Maternal Uncle      HEART DISEASE Paternal Grandfather      CEREBROVASCULAR DISEASE Maternal Grandmother 92     Social History   Substance Use Topics     Smoking status: Never Smoker      Smokeless tobacco: Never Used     Alcohol Use: 0.0 oz/week     0 Standard drinks or equivalent per week      Comment: socially/not  now      Obstetric History       T1      TAB0   SAB0   E0   M0   L1       # Outcome Date GA Lbr Erik/2nd Weight Sex Delivery Anes PTL Lv   2 Current            1 Term 14 42w0d  3.941 kg (8 lb 11 oz) M CS-LTranv EPI  Y      Apgar1:  8                Apgar5: 9     "      LABS:     ABO        A   2016  RH      Neg   2016  Rhogam indicated and given on 11/3/16  Rubella immune   Treponema Pallidum Antibody  Negative    HIV    Non-reactive   HGB     10.9   2017   HEPBANG   Nonreactive   2016  GBS   *   2017    Value: Negative  No GBS DNA detected, presumed negative for GBS or number of bacteria may be   below the limit of detection of the assay.   Assay performed on incubated broth culture of specimen using [employer] real-time   PCR.      ROS  Pt denies significant constitutional symptoms including fever and/or malaise.  Pt denies significant respiratory, cardiovacular, GI, or muscular/skeletal complaints.    She has a cold and stuffy nose    PHYSICAL EXAM:  /76 mmHg  Pulse 98  Temp(Src) 98.3  F (36.8  C) (Oral)  Resp 20  Ht 1.549 m (5' 1\")  Wt 103.42 kg (228 lb)  BMI 43.10 kg/m2  LMP 2016 (LMP Unknown)  General appearance healthy, alert, active, mild distress, cooperative and focussed with contractions    Neuro:  denies headache and visual disturbances  Psych: Mentation normal and bright   Legs: 2+/2+, no clonus, no edema       Abdomen: gravid, single vertex fetus, non-tender, EFW 8 lbs. Pt is janice every 1.5-4 minutes, lasting 60 seconds and palpates mild    FETAL HEART TONES: baseline 120 with moderate FHRV variability and accelerations.  No decelerations present.     PELVIC EXAM: 3/ 90% / -2 per RN exam in triage   BLOODY SHOW:: no  Membranes as listed above    ASSESSMENT:  IUP @ 39 wks gestation  in early labor  NST  reactive   Parity: Multip, with history of 1    TOLAC - 1  for failed induction  GBS negative and membranes intact  Rh negative     PLAN:  Admit - see IP orders  Pain medication as needed/desired  Resident Dr. Narayan notified of TOLAC.  We reviewed FHT strip together.  She will let Dr. Nixon know when they review another patient.    Anticipate     "

## 2017-02-11 NOTE — PLAN OF CARE
Problem: Labor (Cervical Ripen, Induct, Augment) (Adult,Obstetrics,Pediatric)  Goal: Signs and Symptoms of Listed Potential Problems Will be Absent or Manageable (Labor)  Signs and symptoms of listed potential problems will be absent or manageable by discharge/transition of care (reference Labor (Cervical Ripen, Induct, Augment) (Adult,Obstetrics,Pediatric) CPG).   Outcome: Therapy, progress toward functional goals as expected   Patient is comfortable under an epidural anesthesia. See flow sheet for fetal and uterine monitoring. Will continue monitoring labor and notify provider with changes.

## 2017-02-11 NOTE — PROGRESS NOTES
"S: Patient has epidural and comfortable. Resting now since they did not get much sleep overnight. No questions or concerns. Discussed plan for today. Will do cervical check around 11am.     O:  Blood pressure 110/62, pulse 98, temperature 98.3  F (36.8  C), temperature source Oral, resp. rate 16, height 1.549 m (5' 1\"), weight 103.42 kg (228 lb), last menstrual period 2016, SpO2 99 %.  General appearance: comfortable    CONTACTIONS: Contractions every 1-5 minutes.  Palpate: moderate  FETAL HEART TONES: baseline 135 with moderate FHR variability and    accelerations yes. Decelerations occasional early decelerations and occasional variable decelerations. Mostly moderate variability with accelerations. Overall reassuring fetal heart tracing    NST: REACTIVE  ROM: not ruptured  PELVIC EXAM:deferred  Fetal Position: cephalic  Bloody show: No  Pitocin- none,  Antibiotics- none  Cervical ripening: N/A    ASSESSMENT:  ==============  IUP @ 39w6d active labor   Fetal Heart rate tracing Category category one  GBS- negative     PLAN:  ===========  Comfort measures prn   Pain medication with epidural   Anticipate   Reevaluate in 2-4 hours/PRN     Deyanira Moreno CNM    "

## 2017-02-12 PROBLEM — Z98.891 S/P CESAREAN SECTION: Status: ACTIVE | Noted: 2017-02-12

## 2017-02-12 LAB
ABO + RH BLD: ABNORMAL
ABO + RH BLD: ABNORMAL
BLD GP AB INVEST PLASRBC-IMP: ABNORMAL
BLD GP AB SCN SERPL QL: ABNORMAL
BLOOD BANK CMNT PATIENT-IMP: ABNORMAL
BLOOD BANK CMNT PATIENT-IMP: ABNORMAL
BLOOD BANK CMNT PATIENT-IMP: NORMAL
HGB BLD-MCNC: 9.8 G/DL (ref 11.7–15.7)
SPECIMEN EXP DATE BLD: ABNORMAL

## 2017-02-12 PROCEDURE — 12000030 ZZH R&B OB INTERMEDIATE UMMC

## 2017-02-12 PROCEDURE — 36415 COLL VENOUS BLD VENIPUNCTURE: CPT | Performed by: OBSTETRICS & GYNECOLOGY

## 2017-02-12 PROCEDURE — 25000132 ZZH RX MED GY IP 250 OP 250 PS 637: Performed by: OBSTETRICS & GYNECOLOGY

## 2017-02-12 PROCEDURE — 25000125 ZZHC RX 250: Performed by: OBSTETRICS & GYNECOLOGY

## 2017-02-12 PROCEDURE — 25000128 H RX IP 250 OP 636: Performed by: OBSTETRICS & GYNECOLOGY

## 2017-02-12 PROCEDURE — 85018 HEMOGLOBIN: CPT | Performed by: OBSTETRICS & GYNECOLOGY

## 2017-02-12 PROCEDURE — 85461 HEMOGLOBIN FETAL: CPT | Performed by: OBSTETRICS & GYNECOLOGY

## 2017-02-12 PROCEDURE — 86900 BLOOD TYPING SEROLOGIC ABO: CPT | Performed by: OBSTETRICS & GYNECOLOGY

## 2017-02-12 PROCEDURE — 25800025 ZZH RX 258: Performed by: OBSTETRICS & GYNECOLOGY

## 2017-02-12 PROCEDURE — 25000132 ZZH RX MED GY IP 250 OP 250 PS 637: Performed by: ADVANCED PRACTICE MIDWIFE

## 2017-02-12 PROCEDURE — 86901 BLOOD TYPING SEROLOGIC RH(D): CPT | Performed by: OBSTETRICS & GYNECOLOGY

## 2017-02-12 RX ORDER — OXYCODONE AND ACETAMINOPHEN 5; 325 MG/1; MG/1
1-2 TABLET ORAL EVERY 4 HOURS PRN
Qty: 30 TABLET | Refills: 0 | Status: SHIPPED | OUTPATIENT
Start: 2017-02-12 | End: 2017-02-13

## 2017-02-12 RX ORDER — AMOXICILLIN 250 MG
1-2 CAPSULE ORAL 2 TIMES DAILY
Qty: 100 TABLET | Refills: 0 | Status: SHIPPED | OUTPATIENT
Start: 2017-02-12 | End: 2017-03-29

## 2017-02-12 RX ORDER — IBUPROFEN 600 MG/1
600 TABLET, FILM COATED ORAL EVERY 6 HOURS PRN
Qty: 60 TABLET | Refills: 0 | Status: SHIPPED | OUTPATIENT
Start: 2017-02-12 | End: 2017-02-13

## 2017-02-12 RX ORDER — ACETAMINOPHEN 325 MG/1
975 TABLET ORAL EVERY 8 HOURS
Status: DISCONTINUED | OUTPATIENT
Start: 2017-02-12 | End: 2017-02-13 | Stop reason: HOSPADM

## 2017-02-12 RX ORDER — LIDOCAINE 40 MG/G
CREAM TOPICAL
Status: DISCONTINUED | OUTPATIENT
Start: 2017-02-12 | End: 2017-02-13 | Stop reason: HOSPADM

## 2017-02-12 RX ORDER — OXYTOCIN/0.9 % SODIUM CHLORIDE 30/500 ML
340 PLASTIC BAG, INJECTION (ML) INTRAVENOUS CONTINUOUS PRN
Status: DISCONTINUED | OUTPATIENT
Start: 2017-02-12 | End: 2017-02-13 | Stop reason: HOSPADM

## 2017-02-12 RX ORDER — HYDROCORTISONE 2.5 %
CREAM (GRAM) TOPICAL 3 TIMES DAILY PRN
Status: DISCONTINUED | OUTPATIENT
Start: 2017-02-12 | End: 2017-02-13 | Stop reason: HOSPADM

## 2017-02-12 RX ORDER — AMOXICILLIN 250 MG
1-2 CAPSULE ORAL 2 TIMES DAILY
Status: DISCONTINUED | OUTPATIENT
Start: 2017-02-12 | End: 2017-02-13 | Stop reason: HOSPADM

## 2017-02-12 RX ORDER — OXYCODONE HYDROCHLORIDE 5 MG/1
5-10 TABLET ORAL
Status: DISCONTINUED | OUTPATIENT
Start: 2017-02-12 | End: 2017-02-13 | Stop reason: HOSPADM

## 2017-02-12 RX ORDER — METHYLERGONOVINE MALEATE 0.2 MG/ML
200 INJECTION INTRAVENOUS
Status: DISCONTINUED | OUTPATIENT
Start: 2017-02-12 | End: 2017-02-13 | Stop reason: HOSPADM

## 2017-02-12 RX ORDER — IBUPROFEN 400 MG/1
400-800 TABLET, FILM COATED ORAL EVERY 6 HOURS PRN
Status: DISCONTINUED | OUTPATIENT
Start: 2017-02-12 | End: 2017-02-13 | Stop reason: HOSPADM

## 2017-02-12 RX ORDER — BISACODYL 10 MG
10 SUPPOSITORY, RECTAL RECTAL DAILY PRN
Status: DISCONTINUED | OUTPATIENT
Start: 2017-02-13 | End: 2017-02-13 | Stop reason: HOSPADM

## 2017-02-12 RX ORDER — ONDANSETRON 2 MG/ML
4 INJECTION INTRAMUSCULAR; INTRAVENOUS EVERY 6 HOURS PRN
Status: DISCONTINUED | OUTPATIENT
Start: 2017-02-12 | End: 2017-02-13 | Stop reason: HOSPADM

## 2017-02-12 RX ORDER — LANOLIN 100 %
OINTMENT (GRAM) TOPICAL
Status: DISCONTINUED | OUTPATIENT
Start: 2017-02-12 | End: 2017-02-13 | Stop reason: HOSPADM

## 2017-02-12 RX ORDER — DEXTROSE, SODIUM CHLORIDE, SODIUM LACTATE, POTASSIUM CHLORIDE, AND CALCIUM CHLORIDE 5; .6; .31; .03; .02 G/100ML; G/100ML; G/100ML; G/100ML; G/100ML
INJECTION, SOLUTION INTRAVENOUS CONTINUOUS
Status: DISCONTINUED | OUTPATIENT
Start: 2017-02-12 | End: 2017-02-13 | Stop reason: HOSPADM

## 2017-02-12 RX ORDER — DIPHENHYDRAMINE HYDROCHLORIDE 50 MG/ML
25 INJECTION INTRAMUSCULAR; INTRAVENOUS EVERY 6 HOURS PRN
Status: DISCONTINUED | OUTPATIENT
Start: 2017-02-12 | End: 2017-02-13 | Stop reason: HOSPADM

## 2017-02-12 RX ORDER — OXYTOCIN/0.9 % SODIUM CHLORIDE 30/500 ML
100 PLASTIC BAG, INJECTION (ML) INTRAVENOUS CONTINUOUS
Status: DISCONTINUED | OUTPATIENT
Start: 2017-02-12 | End: 2017-02-13 | Stop reason: HOSPADM

## 2017-02-12 RX ORDER — DIPHENHYDRAMINE HCL 25 MG
25 CAPSULE ORAL EVERY 6 HOURS PRN
Status: DISCONTINUED | OUTPATIENT
Start: 2017-02-12 | End: 2017-02-13 | Stop reason: HOSPADM

## 2017-02-12 RX ORDER — HYDROMORPHONE HYDROCHLORIDE 1 MG/ML
.3-.5 INJECTION, SOLUTION INTRAMUSCULAR; INTRAVENOUS; SUBCUTANEOUS EVERY 30 MIN PRN
Status: DISCONTINUED | OUTPATIENT
Start: 2017-02-12 | End: 2017-02-13 | Stop reason: HOSPADM

## 2017-02-12 RX ORDER — ACETAMINOPHEN 325 MG/1
650 TABLET ORAL EVERY 4 HOURS PRN
Status: DISCONTINUED | OUTPATIENT
Start: 2017-02-14 | End: 2017-02-13 | Stop reason: HOSPADM

## 2017-02-12 RX ORDER — SIMETHICONE 80 MG
80 TABLET,CHEWABLE ORAL 4 TIMES DAILY PRN
Status: DISCONTINUED | OUTPATIENT
Start: 2017-02-12 | End: 2017-02-13 | Stop reason: HOSPADM

## 2017-02-12 RX ORDER — NALOXONE HYDROCHLORIDE 0.4 MG/ML
.1-.4 INJECTION, SOLUTION INTRAMUSCULAR; INTRAVENOUS; SUBCUTANEOUS
Status: DISCONTINUED | OUTPATIENT
Start: 2017-02-12 | End: 2017-02-13 | Stop reason: HOSPADM

## 2017-02-12 RX ORDER — MISOPROSTOL 200 UG/1
400 TABLET ORAL
Status: DISCONTINUED | OUTPATIENT
Start: 2017-02-12 | End: 2017-02-13 | Stop reason: HOSPADM

## 2017-02-12 RX ORDER — CARBOPROST TROMETHAMINE 250 UG/ML
250 INJECTION, SOLUTION INTRAMUSCULAR
Status: DISCONTINUED | OUTPATIENT
Start: 2017-02-12 | End: 2017-02-13 | Stop reason: HOSPADM

## 2017-02-12 RX ORDER — OXYTOCIN 10 [USP'U]/ML
10 INJECTION, SOLUTION INTRAMUSCULAR; INTRAVENOUS
Status: DISCONTINUED | OUTPATIENT
Start: 2017-02-12 | End: 2017-02-13 | Stop reason: HOSPADM

## 2017-02-12 RX ORDER — KETOROLAC TROMETHAMINE 30 MG/ML
30 INJECTION, SOLUTION INTRAMUSCULAR; INTRAVENOUS EVERY 6 HOURS
Status: DISCONTINUED | OUTPATIENT
Start: 2017-02-12 | End: 2017-02-13

## 2017-02-12 RX ADMIN — ACETAMINOPHEN 975 MG: 325 TABLET, FILM COATED ORAL at 11:30

## 2017-02-12 RX ADMIN — SIMETHICONE CHEW TAB 80 MG 80 MG: 80 TABLET ORAL at 16:00

## 2017-02-12 RX ADMIN — ACETAMINOPHEN 975 MG: 325 TABLET, FILM COATED ORAL at 04:00

## 2017-02-12 RX ADMIN — KETOROLAC TROMETHAMINE 30 MG: 30 INJECTION, SOLUTION INTRAMUSCULAR at 16:00

## 2017-02-12 RX ADMIN — KETOROLAC TROMETHAMINE 30 MG: 30 INJECTION, SOLUTION INTRAMUSCULAR at 22:14

## 2017-02-12 RX ADMIN — OXYTOCIN-SODIUM CHLORIDE 0.9% IV SOLN 30 UNIT/500ML 100 ML/HR: 30-0.9/5 SOLUTION at 00:54

## 2017-02-12 RX ADMIN — KETOROLAC TROMETHAMINE 30 MG: 30 INJECTION, SOLUTION INTRAMUSCULAR at 03:45

## 2017-02-12 RX ADMIN — SODIUM CHLORIDE, SODIUM LACTATE, POTASSIUM CHLORIDE, CALCIUM CHLORIDE, AND DEXTROSE MONOHYDRATE: 600; 310; 30; 20; 5 INJECTION, SOLUTION INTRAVENOUS at 05:40

## 2017-02-12 RX ADMIN — KETOROLAC TROMETHAMINE 30 MG: 30 INJECTION, SOLUTION INTRAMUSCULAR at 09:26

## 2017-02-12 RX ADMIN — SIMETHICONE CHEW TAB 80 MG 80 MG: 80 TABLET ORAL at 08:07

## 2017-02-12 RX ADMIN — SENNOSIDES AND DOCUSATE SODIUM 2 TABLET: 8.6; 5 TABLET ORAL at 20:40

## 2017-02-12 RX ADMIN — SENNOSIDES AND DOCUSATE SODIUM 2 TABLET: 8.6; 5 TABLET ORAL at 08:07

## 2017-02-12 RX ADMIN — SIMETHICONE CHEW TAB 80 MG 80 MG: 80 TABLET ORAL at 22:14

## 2017-02-12 RX ADMIN — ACETAMINOPHEN 650 MG: 325 TABLET, FILM COATED ORAL at 00:03

## 2017-02-12 RX ADMIN — ACETAMINOPHEN 975 MG: 325 TABLET, FILM COATED ORAL at 20:40

## 2017-02-12 NOTE — PROGRESS NOTES
Labor is not progressing as expected. See flow sheet for fetal and uterine monitoring. Provider notified abdout labor status and requested to assess the patient in person. Decision make to proced to  section at 1820. Patient and spouse educated about C/S. Care transferd to Lynette Finch RN AT 1900.

## 2017-02-12 NOTE — ANESTHESIA CARE TRANSFER NOTE
Patient: Ramya BALBUENA    Procedure(s):   - Wound Class: I-Clean    Diagnosis: pregnancy  Diagnosis Additional Information: No value filed.    Anesthesia Type:   Spinal, Periph. Nerve Block for postop pain     Note:  Airway :Room Air  Patient transferred to:Labor and Delivery  Comments: Report to Lynette REYES      Vitals: (Last set prior to Anesthesia Care Transfer)    CRNA VITALS  2/11/2017 2054 - 2/11/2017 2132 2/11/2017             Pulse: 82    SpO2: 97 %    Resp Rate (set): 10      CRNA VITALS  2/11/2017 2054 - 2/11/2017 2132 2/11/2017             Pulse: 82    SpO2: 97 %    Resp Rate (set): 10                Electronically Signed By: CECILIA Gupta CRNA  February 11, 2017  9:32 PM

## 2017-02-12 NOTE — PROGRESS NOTES
Midwife courtesy visit:  No questions or concerns for me.  They were pleased with care during labor.

## 2017-02-12 NOTE — PROGRESS NOTES
Pt with lack of cervical change and so desires repeat c/s at this point.  Repeat c/s was discussed in detail including risk of bleeding, infection, damage to abdominal organs including bowel, bladder, blood vessels, nerves, and baby.   Recovery period/hosptial stay and restrictions discussed.  Pain medications after surgery were discussed.  All questions were answered.  Will plan for surgery when OR available--about 1 hour.  (case going on in main OR right now)    Failed TOLAC.    Diane Anderson MD

## 2017-02-12 NOTE — DISCHARGE SUMMARY
DELIVERY DISCHARGE SUMMARY    Admit date: 2017  Discharge date: 17    Admit Dx:   - 36 year old  at 39w6d   - History of  section x1  - PROM  - Obesity in pregnancy  - Rh negative status    Discharge Dx:  - Same as above, delivered    Procedures:  - Low transverse  section with double layer closure via Pfannenstiel incision      Admit HPI and Indications for delivery:   Ramya BALBUENA is a 36 year old,  at 39w6d who was admitted for spontaneous TOLAC. She initially made cervical change with subsequent cervical exams being unchanged over the course of a day. She was counseled on pitocin augmentation versus repeat  section and she desired to proceed with repeat . The risks, benefits, and alternatives of  delivery were explained and the patient agreed to proceed.     Please see her admit H&P for full details of her PMH, PSH, Meds, Allergies and exam on admit.    Operative findings: Single viable female infant at 2042 hours on 2017. Apgars of 8 and 9 at one and five minutes. Birth weight (GM): 4082 GM. Fetal presentation: cephalic. Position: OA Amniotic fluid: thin meconium. Placenta intact with 3 vessel cord. Normal appearing uterus, fallopian tubes, ovaries. Minimal omental adhesions of anterior abdominal wall. Minimal abdominal wall adhesions.     Please see her  Section Operative Note for full details regarding her delivery.    Postoperative Course:   Her postoperative course was uncomplicated.  On POD#2, she was meeting all of her postpartum goals and deemed stable for discharge. She was voiding without difficulty, tolerating a regular diet without nausea and vomiting, her pain was well controlled on oral pain medicines and her lochia was appropriate. Her hemoglobin prior to delivery was 11.2 and after delivery was 9.8. Her Rh status was negative and Rhogam was indicated and given on 17. At the time of discharge,  she was breast feeding her infant and had decided on condoms for birth control.       Discharge/Disposition:  Ramya BALBUENA was discharged to home in stable condition with the following instructions/medications:  - Call for temperature > 100.4, foul smelling vaginal discharge, bleeding > 1 pad per hour x 2 hrs, pain not controlled by oral pain meds, severe constipation or severe nausea or vomiting.  - She was instructed to follow-up with her primary OB in 6 weeks for a routine postpartum visit.    - She was discharged home with the following medications:    Ramya BALBUENA   Home Medication Instructions ZACKARY:[id number]    Printed on:02/16/17 0121   Medication Information                      order for DME  Equipment being ordered: double electric breast pump             oxyCODONE (ROXICODONE) 5 MG IR tablet  Take 1-2 tablets (5-10 mg) by mouth every 4 hours as needed for moderate to severe pain             Prenatal Multivit-Min-Fe-FA (PRENATAL VITAMINS PO)               senna-docusate (SENOKOT-S;PERICOLACE) 8.6-50 MG per tablet  Take 1-2 tablets by mouth 2 times daily             simethicone (MYLICON) 80 MG chewable tablet  Take 1 tablet (80 mg) by mouth 4 times daily as needed for other (gas)               Blanca Rudea MD  OBGYN PGY2      Agree with above documentation.  Yadi Ricci MD

## 2017-02-12 NOTE — PLAN OF CARE
PT. TRANSFERRED TO RM 7125, PER CART.  PT. TO PP BED, WITH ASSIST OF AIR MAT.  PT. ABLE TO MOVE SIDE TO SIDE, WITH MINIMAL ASSISTANCE.  PERICARE COMPLETED.  NEW PERIPAD APPLIED.  PT. WITH BABY, BOTH IN STABLE CONDITION.  REPORT GIVEN TO LEONOR REYES.

## 2017-02-12 NOTE — ANESTHESIA POSTPROCEDURE EVALUATION
Patient: Ramya BALBUENA    Procedure(s):   - Wound Class: I-Clean    Diagnosis:pregnancy  Diagnosis Additional Information: No value filed.    Anesthesia Type:  Spinal, Periph. Nerve Block for postop pain    Note:  Anesthesia Post Evaluation    Patient location during evaluation: OB PACU  Patient participation: Able to participate in evaluation but full recovery from regional anesthesia has not yet ocurrred but is anticipated to occur within 48 hours (Spinal wearing off)  Level of consciousness: awake and alert  Pain management: adequate  Airway patency: patent  Cardiovascular status: acceptable  Respiratory status: acceptable  Hydration status: acceptable  PONV: none     Anesthetic complications: None          Last vitals:  Filed Vitals:    02/11/17 1948 02/11/17 2127 02/11/17 2140   BP: 101/59 104/59 117/55   Pulse:      Temp: 37.6  C (99.6  F) 37.4  C (99.4  F)    Resp: 16 20 16   SpO2: 96% 95%          Electronically Signed By: Francisco Bojorquez MD  February 11, 2017  9:53 PM

## 2017-02-12 NOTE — PLAN OF CARE
The EMR was down for 5.5 hours hours on 2/12/2017.    Radha Degroot was responsible for completing the paper charting during this time period.     The following information was re-entered into the system by Toma Montenegro: MAR    The following information will remain in the paper chart: MAR and Physician order    Toma Montenegro  2/12/2017

## 2017-02-12 NOTE — PROGRESS NOTES
February 12, 2017       [facility] Post C/S Progress Note:       DAILY NOTE - POST-OP DAY 1    SUBJECTIVE: tired, baby wanted to nurse all night.  Good pain control.      Pain controlled? Yes  Tolerating a regular diet? YES  Ambulating? YES  Voiding without difficulty? Yes  Lochia? minimal  Breastfeeding:  yes      OBJECTIVE:  Vitals:    02/12/17 0230 02/12/17 0330 02/12/17 0430 02/12/17 0750   BP: 104/60 110/72 102/62 95/57   Pulse:       Resp: 16 16 16 16   Temp: 98.6  F (37  C)   98.1  F (36.7  C)   TempSrc: Oral   Oral   SpO2: 94% 97% 98% 100%   Weight:       Height:           Constitutional:  Healthy, tired, No acute distress     Abdomen:  Uterine fundus is firm, non-tender and at the level of the umbilicus abdomen is soft, distended. Incision is dry, intact with swelling or bruising       LE:  Compression devices in place     LABS:  Hemoglobin   Date Value Ref Range Status   02/12/2017 9.8 (L) 11.7 - 15.7 g/dL Final   02/11/2017 11.2 (L) 11.7 - 15.7 g/dL Final     No results found for: [id number]   Lab Results   Component Value Date    ABO A 02/12/2017           Lab Results   Component Value Date    RH  Neg 02/12/2017        ASSESSMENT:  Post operative Day 1, meeting goals.  Needs rest  Rh negative, may need Rho susanne      PLAN:   Ambulate, DC IV  Administer RhoGam if indicated    Soila Hernandez MD

## 2017-02-12 NOTE — PLAN OF CARE
Problem: Goal Outcome Summary  Goal: Goal Outcome Summary  Outcome: Therapy, progress toward functional goals as expected  Data: Vital signs within normal limits. Postpartum checks within normal limits - see flow record. Patient eating and drinking normally. Brooks d/c at 1145 am and pt had no void yet.  Pt was  up ambulating. No apparent signs of infection. Incision AGUILA, and intact. Patient performing self cares and is able to care for infant.  Action: Patient medicated during the shift for incisional pain. See MAR. Patient reassessed within 1 hour after each medication and pain was improved - patient stated she was comfortable. Patient education pain management and self care.    Response: Positive attachment behaviors observed with infant. Support persons  present.   Plan: will continue with current plan of care, intervene as needed.

## 2017-02-12 NOTE — PLAN OF CARE
Problem: Goal Outcome Summary  Goal: Goal Outcome Summary  Outcome: Therapy, progress toward functional goals as expected  VSS, postpartum assessments WNL. Fundus firm, small-scant bleeding, incision with liquid bandage open to air with no signs of infection. Urine output WNL with burgess in place, burgess cares done. She is breastfeeding infant, infant has been continuously at the breast since arrival to [facility]. Nipples are intact and she is able to express drops of colostrum before latch. Tolerating crackers and water + ginger ale. No gas or BM yet. Pain managed with toradol and tylenol - she has denied pain since arrival to [facility]. Support person, /FOB, is in the room and is helpful with Patient needs and infant cares. Continue with plan of care.

## 2017-02-13 VITALS
HEART RATE: 78 BPM | OXYGEN SATURATION: 99 % | BODY MASS INDEX: 43.05 KG/M2 | HEIGHT: 61 IN | SYSTOLIC BLOOD PRESSURE: 112 MMHG | WEIGHT: 228 LBS | TEMPERATURE: 98 F | DIASTOLIC BLOOD PRESSURE: 70 MMHG | RESPIRATION RATE: 18 BRPM

## 2017-02-13 LAB
ABO + RH BLD: NORMAL
ABO + RH BLD: NORMAL
BLOOD BANK CMNT PATIENT-IMP: NORMAL
DATE RH IMM GL GVN: NORMAL
FETAL CELL SCN BLD QL ROSETTE: NORMAL
RH IG VIALS RECOM PATIENT: NORMAL

## 2017-02-13 PROCEDURE — 25000132 ZZH RX MED GY IP 250 OP 250 PS 637: Performed by: OBSTETRICS & GYNECOLOGY

## 2017-02-13 PROCEDURE — 25000128 H RX IP 250 OP 636: Performed by: OBSTETRICS & GYNECOLOGY

## 2017-02-13 RX ORDER — OXYCODONE HYDROCHLORIDE 5 MG/1
5-10 TABLET ORAL EVERY 4 HOURS PRN
Qty: 30 TABLET | Refills: 0 | Status: SHIPPED | OUTPATIENT
Start: 2017-02-13 | End: 2017-03-29

## 2017-02-13 RX ORDER — KETOROLAC TROMETHAMINE 30 MG/ML
30 INJECTION, SOLUTION INTRAMUSCULAR; INTRAVENOUS EVERY 6 HOURS PRN
Status: DISCONTINUED | OUTPATIENT
Start: 2017-02-13 | End: 2017-02-13 | Stop reason: HOSPADM

## 2017-02-13 RX ORDER — SIMETHICONE 80 MG
80 TABLET,CHEWABLE ORAL 4 TIMES DAILY PRN
Qty: 60 TABLET | Refills: 0 | Status: SHIPPED | OUTPATIENT
Start: 2017-02-13 | End: 2017-03-29

## 2017-02-13 RX ADMIN — IBUPROFEN 800 MG: 400 TABLET ORAL at 12:05

## 2017-02-13 RX ADMIN — HUMAN RHO(D) IMMUNE GLOBULIN 300 MCG: 300 INJECTION, SOLUTION INTRAMUSCULAR at 15:52

## 2017-02-13 RX ADMIN — ACETAMINOPHEN 975 MG: 325 TABLET, FILM COATED ORAL at 04:53

## 2017-02-13 RX ADMIN — IBUPROFEN 800 MG: 400 TABLET ORAL at 04:53

## 2017-02-13 RX ADMIN — SENNOSIDES AND DOCUSATE SODIUM 2 TABLET: 8.6; 5 TABLET ORAL at 08:50

## 2017-02-13 RX ADMIN — ACETAMINOPHEN 975 MG: 325 TABLET, FILM COATED ORAL at 12:51

## 2017-02-13 RX ADMIN — SIMETHICONE CHEW TAB 80 MG 80 MG: 80 TABLET ORAL at 08:51

## 2017-02-13 NOTE — PROGRESS NOTES
Postpartum Progress Note  Ramya BALBUENA  [id number]    Subjective:  Patient states that she is feeling well this morning.  She rates her current pain as a 2/10. Lochia is similar to a heavy menses.  She is ambulating without dizziness, tolerating regular diet without nausea or vomiting, voiding without issues, and passing flatus.  She is breast feeding.  She plans to use condoms for birth control.    Objective:  Vitals:    17 1109 17 1600 17 2000 17 2315   BP: 103/57 104/86 106/67 118/68   Pulse: 65      Resp: 16 16 16 16   Temp: 98.4  F (36.9  C) 98.2  F (36.8  C) 98.2  F (36.8  C) 98.4  F (36.9  C)   TempSrc: Oral Oral Oral Oral   SpO2: 100% 100% 99%    Weight:       Height:           I/O last 3 completed shifts:  In: 1500 [P.O.:1500]  Out: 795 [Urine:795]    General: awake, alert, answering questions appropriately, in no acute distress, comfortable  Heart: regular rate and rhythm  Lungs: clear to auscultation bilaterally  Abdomen: Soft, non-tender, non-distended; fundus firm and at 1cm below the level of the umbilicus  Incision:  Pfannenstiel incision clean, dry, intact, no surrounding erythema/fluctuance, skin glue in place  Extremities: trace edema in BLE    Labs:  Hemoglobin   Date Value Ref Range Status   2017 9.8 (L) 11.7 - 15.7 g/dL Final     Hemoglobin   Date Value Ref Range Status   2017 9.8 (L) 11.7 - 15.7 g/dL Final   2017 11.2 (L) 11.7 - 15.7 g/dL Final       Assessment/Plan: 36 year old  who is POD#2 s/p repeat low transverse  section after spontaneous rupture of membranes.  Currently stable and doing well.    - Routine post-op care.      Ambulating, voiding without difficulty.     Tolerating regular diet, passing flatus.  - Heme:    Hgb 11.2 > > 9.8  Plan for discharge to home on iron supplementation  - Baby:     Doing well  - Contraception:   condoms  - Rh negative, baby is Rh positive.  Rhogam eval completed.  Rhogam injection  ordered.  - Rubella immune    Dispo: discharge to home likely post op day number 3-4    Blanca Rueda MD  OBGYN PGY2      Physician Attestation   I, Yadi Ricci, personally examined and evaluated this patient.  I discussed the patient with the resident and care team, and agree with the assessment and plan of care as documented in the resident s note of 2/13/2017  [date].      I personally reviewed vital signs, medications, labs and exam.    Key findings: doing well on POD 2 s/p RLTCS. Meeting milestones and planning discharge to home this evening. Reviewed discharge instructions including activity restrictions, pelvic rest and follow up plan. Reviewed signs of excessive bleeding, infection, postpartum pre-eclampsia, mastitis, DVT and postpartum depression - instructed patient to call if concerned about any of these or other concerns. Patient to follow up in 6 weeks for routine postpartum visit, planning condoms for contraception. All questions answered.     Yadi Ricci  Date of Service (when I saw the patient): 02/13/17

## 2017-02-13 NOTE — PLAN OF CARE
Problem: Goal Outcome Summary  Goal: Goal Outcome Summary  Outcome: Improving  Data: Vital signs within normal limits. Postpartum checks within normal limits - see flow record. Patient eating and drinking normally. Patient able to empty bladder independently and is up ambulating. No apparent signs of infection. Incision healing well. Patient performing self cares and is able to care for infant.  Action: Patient medicated during the shift for pain. See MAR. Patient reassessed within 1 hour after each medication and pain was improved - patient stated she was comfortable. Patient education done about self cares. See flow record.  Response: Positive attachment behaviors observed with infant. Support persons  present.   Plan: Anticipate discharge on Tuesday February 14th.

## 2017-02-13 NOTE — PLAN OF CARE
Problem: Goal Outcome Summary  Goal: Goal Outcome Summary  Outcome: Adequate for Discharge Date Met:  02/13/17  Patient taking care of self and infant independently. Breastfeeding independently, good latch observed. Postpartum checks wnl. Incision WDL. Pt educated on discharge instructions in d/c class this AM and given written handout, verbalized understanding of instructions. Discharged with medications, patient educated on medications and given written copies as well.  Patient discharged to home with spouse and baby.

## 2017-02-13 NOTE — PLAN OF CARE
Problem: Goal Outcome Summary  Goal: Goal Outcome Summary  Outcome: Improving  VSS and postpartum assessment WDL.  Up in room independently with steady gait.  Pain managed with tylenol and toradol per MAR.  Also taking PRN simethicone per MAR for gas discomfort.  Voiding without difficulty.  Bonding well with infant.  Breastfeeding on cue independently with good latch observed.  , Aditya, present and supportive.  PIV in left hand saline locked.  Will continue with postpartum cares and education per plan of care.

## 2017-02-13 NOTE — PLAN OF CARE
Problem: Goal Outcome Summary  Goal: Goal Outcome Summary  Outcome: Therapy, progress toward functional goals as expected  VSS. Postpartum check WDL. Pain managed with Ibuprofen and Tylenol. Up ad ana cristina. Showered this AM. Voiding without difficulty. Passing gas. Breastfeeding independently.  at bedside and supportive. Attended discharge class today. Continue cares.

## 2017-02-23 ENCOUNTER — MYC MEDICAL ADVICE (OUTPATIENT)
Dept: FAMILY MEDICINE | Facility: CLINIC | Age: 37
End: 2017-02-23

## 2017-02-23 NOTE — TELEPHONE ENCOUNTER
PN-     Forms in yellow guide in your box.  Please review and advise if these are for you, or for midwife to complete    Dahlia Salas CMA

## 2017-03-07 ENCOUNTER — MYC MEDICAL ADVICE (OUTPATIENT)
Dept: FAMILY MEDICINE | Facility: CLINIC | Age: 37
End: 2017-03-07

## 2017-03-07 NOTE — TELEPHONE ENCOUNTER
PN, ok to send [facility]'s hospital encounter (which includes mom's name) and copy of [employer] paperwork to pt?    Dahlia Salas, CMA

## 2017-03-07 NOTE — LETTER
[facility]  [address], [address]  [place], [place] [address]  [phone]     March 8, 2017     Ramya BALBUENA                                                                                                                               [address]  [facility] [address]        Dear Ramya,    Enclosed are your requested forms/records.  Please let us know if you have any questions.        Sincerely,    Dahlia Salas [facility]  Care Team for Dr. Elliott      Clinic hours:  Monday   7:30 AM - 5:00 PM    Tuesday  7:00 AM - 7:00 PM    Wednesday  7:00 AM - 5:00 PM    Thursday  7:30 AM - 7:00 PM    Friday   7:30 AM - 5:00 PM

## 2017-03-29 ENCOUNTER — PRENATAL OFFICE VISIT (OUTPATIENT)
Dept: FAMILY MEDICINE | Facility: CLINIC | Age: 37
End: 2017-03-29
Payer: COMMERCIAL

## 2017-03-29 VITALS
DIASTOLIC BLOOD PRESSURE: 70 MMHG | OXYGEN SATURATION: 99 % | SYSTOLIC BLOOD PRESSURE: 118 MMHG | TEMPERATURE: 97.7 F | HEIGHT: 61 IN | HEART RATE: 83 BPM | BODY MASS INDEX: 38.14 KG/M2 | WEIGHT: 202 LBS

## 2017-03-29 PROBLEM — Z98.891 S/P CESAREAN SECTION: Status: RESOLVED | Noted: 2017-02-12 | Resolved: 2017-03-29

## 2017-03-29 LAB — HGB BLD-MCNC: 14 G/DL (ref 11.7–15.7)

## 2017-03-29 PROCEDURE — 00000218 ZZHCL STATISTIC OBHBG - HEMOGLOBIN: Performed by: FAMILY MEDICINE

## 2017-03-29 PROCEDURE — 99207 ZZC POST PARTUM EXAM: CPT | Performed by: FAMILY MEDICINE

## 2017-03-29 PROCEDURE — 36415 COLL VENOUS BLD VENIPUNCTURE: CPT | Performed by: FAMILY MEDICINE

## 2017-03-29 NOTE — PROGRESS NOTES
Dear Ramya,   Your test results are all back -   -All of your labs are normal.  Let us know if you have any questions.  -Naida Elliott, DO

## 2017-03-29 NOTE — PROGRESS NOTES
SUBJECTIVE: Ramya is here for a 6-week postpartum checkup.    Delivery date was 17. She had a repeat c/s of a viable girl, weight 4082 grams., with no complications.  Since delivery, she has been breast feeding.  She has no signs of infection, bleeding or other complications.  She is not pregnant.  We discussed contraceptions and she has chosen condoms.  She  has not had intercourse since delivery and complains of mild discomfort. Patient screened for postpartum depression and complaints are NEGATIVE. Screening has also been completed for intimate partner violence.    EXAM:  Today's Depression Rating was   PHQ-9 SCORE 10/10/2014   Total Score 7       GENERAL healthy, alert and no distress  HENT: Normocephalic. TM's grossly normal, oropharynx without significant findings.  NECK: NEGATIVE  RESP: Clear to auscultation  CV: RRR no murmur  GI: sof t NT -  incision well healed, linea nigra present and bowel sounds normal  GYN PELVIC: normal external genitalia, normal vaginal mucosa and normal adnexa, no masses or tenderness  MS: positive findings: large varicose veins on the leg  SKIN: no ulcers, lesions or rash  NEURO: alert/oriented to person, location and time, CN 2-12 intact, brisk, symmetric reflexes at knees and biceps b/l, strength 5/5 throughout and symmetric, sensation to light touch grossly intact throughout  PSYCH: normal    ASSESSMENT:   Normal postpartum exam after repeat c/s  (Z39.2) Routine postpartum follow-up  (primary encounter diagnosis)  Comment:    Plan: OB HEMOGLOBIN            .    PLAN:  Return as needed or at time of next expected pap, pelvic, or breast exam.

## 2017-03-29 NOTE — NURSING NOTE
"Chief Complaint   Patient presents with     Post Partum Exam     /70  Pulse 83  Temp 97.7  F (36.5  C) (Oral)  Ht 5' 1\" (1.549 m)  Wt 202 lb (91.6 kg)  LMP 05/08/2016 (LMP Unknown)  SpO2 99%  BMI 38.17 kg/m2 Estimated body mass index is 38.17 kg/(m^2) as calculated from the following:    Height as of this encounter: 5' 1\" (1.549 m).    Weight as of this encounter: 202 lb (91.6 kg).  BP completed using cuff size: regular       Health Maintenance due pending provider review:  NONE    n/a    Dahlia Salas CMA  "

## 2017-09-18 ENCOUNTER — OFFICE VISIT (OUTPATIENT)
Dept: URGENT CARE | Facility: URGENT CARE | Age: 37
End: 2017-09-18
Payer: COMMERCIAL

## 2017-09-18 VITALS
WEIGHT: 201 LBS | OXYGEN SATURATION: 100 % | TEMPERATURE: 102.4 F | HEART RATE: 104 BPM | SYSTOLIC BLOOD PRESSURE: 120 MMHG | BODY MASS INDEX: 37.98 KG/M2 | DIASTOLIC BLOOD PRESSURE: 80 MMHG

## 2017-09-18 DIAGNOSIS — R50.9 FEVER, UNSPECIFIED: ICD-10-CM

## 2017-09-18 DIAGNOSIS — N64.4 BREAST PAIN, RIGHT: ICD-10-CM

## 2017-09-18 DIAGNOSIS — N61.0 MASTITIS IN FEMALE: Primary | ICD-10-CM

## 2017-09-18 PROCEDURE — 99213 OFFICE O/P EST LOW 20 MIN: CPT | Performed by: PHYSICIAN ASSISTANT

## 2017-09-18 RX ORDER — CEFPROZIL 250 MG/5ML
POWDER, FOR SUSPENSION ORAL
Qty: 200 ML | Refills: 0 | Status: SHIPPED | OUTPATIENT
Start: 2017-09-18 | End: 2019-06-14

## 2017-09-18 NOTE — PROGRESS NOTES
SUBJECTIVE:  Ramya BALBUENA is a 37 year old female who presents with fever and right side breast pain, tenderness in the armpit.    Onset of symptoms 1-2 days ago     Pain: pain in the right side breast.         No LMP recorded. Patient is not currently having periods (Reason: Breast Feeding).    Patient is currently breast feeding, feverish    Past Medical History:   Diagnosis Date     Abnormal Pap smear     2012     LSIL (low grade squamous intraepithelial lesion) on Pap smear 6/2011    colp - WNl     NO ACTIVE PROBLEMS      Current Outpatient Prescriptions   Medication Sig Dispense Refill     cefPROZIL (CEFZIL) 250 MG/5ML suspension 10 ml po bid for 10 days 200 mL 0     order for DME Equipment being ordered: double electric breast pump 1 Device 0     Prenatal Multivit-Min-Fe-FA (PRENATAL VITAMINS PO)        Social History     Social History     Marital status:      Spouse name: N/A     Number of children: 0     Years of education: N/A     Occupational History     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 No     Social History Narrative    Social Documentation:5/14/10        Balanced Diet: YES, could be better    Calcium intake: 1-2 per day    Caffeine: 0-1 per day    Exercise:  type of activity skate, aerobics;  5 times per week    Sunscreen: sometimes    Seatbelts:  Yes    Self Breast Exam:  Yes sometime    Self Testicular Exam: No    Physical/Emotional/Sexual Abuse: No    Do you feel safe in your environment? Yes        Cholesterol screen up to date: No     Eye Exam up to date: Yes    Dental Exam up to date: No    Pap smear up to date: Yes, PAP      NIL    4/8/2009    Mammogram up to date: Does Not Apply    Dexa Scan up to date: Does Not Apply    Colonoscopy up to date: Does Not Apply    Immunizations up to date: last td 3/17/2008    Glucose screen if over 40:  N/a        Ly Rubio CMA                        ROS:   CONSTITUTIONAL:POSITIVE  for fever and chills  INTEGUMENTARY/SKIN: Positive for right side breast erythema, warmth  BREAST: Positive for right side breast pain, tenderness  NEURO: NEGATIVE for weakness, dizziness or paresthesias    OBJECTIVE:  /80 (BP Location: Right arm, Patient Position: Chair, Cuff Size: Adult Regular)  Pulse 104  Temp 102.4  F (39.1  C) (Oral)  Wt 201 lb (91.2 kg)  SpO2 100%  BMI 37.98 kg/m2    GENERAL APPEARANCE: healthy, alert and no distress  CV: regular rates and rhythm, normal S1 S2, no murmur noted  ABDOMEN:  soft, nontender, no HSM or masses and bowel sounds normal  BACK: No CVA tenderness  SKIN: no suspicious lesions or rashes  LYMPHATICS: Positive for right axillary tenderness  BREAST: Positive for right outer breast tenderness, pain, warmth      ASSESSMENT/PLN      ICD-10-CM    1. Mastitis in female N61.0 cefPROZIL (CEFZIL) 250 MG/5ML suspension   2. Breast pain, right N64.4 cefPROZIL (CEFZIL) 250 MG/5ML suspension   3. Fever, unspecified R50.9        Mastitis information given to patient   OTC motrin  Follow up with PCP as needed

## 2017-09-18 NOTE — PATIENT INSTRUCTIONS
Mastitis  Mastitis occurs when breast tissue becomes swollen and inflamed. This is almost always due to infection. Mastitis most often affects breastfeeding women during the first 6 weeks after childbirth. For this reason, it s also known as lactation mastitis. Infection may happen after a duct becomes clogged, causing milk to back up in the breast. Mastitis may also occur if bacteria enter the breast through small cracks in the nipple. (Less often, mastitis occurs in women who aren t breastfeeding. If you have mastitis that is not due to breastfeeding, your healthcare provider will give you more information as needed. Treatment may include some of the same home care measures listed below.)  Mastitis may cause flu-like symptoms such as fever, aches, and fatigue. The affected breast may feel painful, warm, tender, firm, or swollen. The skin over the breast may be red (often in a wedge-shaped pattern). You may feel a burning a sensation when breastfeeding.  In most cases, mastitis can be treated with antibiotics. This should clear the infection. If treatment is delayed, a pocket of pus (abscess) can form in the breast tissue. A procedure may then be needed to drain the pus. In severe cases of infection, other treatments may be needed.  Home care  Breastfeeding    It s very important to keep the milk flowing from the infected breast. Continue breastfeeding from both breasts as usual. This will not hurt the baby. If this is too painful, use a breast pump to remove milk from the infected side. This can be fed to your baby or discarded. Note: If you don't continue to breastfeed or pump your breast, bacteria can grow in the milk that is left in your breast. This can make your infection worse.    Tell your healthcare provider if you have problems with breastfeeding. He or she may suggest changes to your technique, if needed. You may also be referred to a lactation nurse or consultant for support with  breastfeeding.  General care    Take any medicines you re prescribed as directed. If you re taking antibiotics, be sure to complete all of the medicine even if you start to feel better. Over-the-counter pain medicines may also be recommended. Don t use breast creams or other products or medicines without talking to your healthcare provider first. Note: If you re concerned about taking medicines while breastfeeding, talk to your healthcare provider.    Rest as often as needed. Also be sure to drink plenty of fluids.    To help relieve pain and swelling, heat or ice may be used. Apply as often as directed by your provider.    Heat: Place a warm compress on the breast. Use a towel soaked in hot water, a heating pad, or a hot water bottle.    Cold: Place a cold compress on the breast. Use an ice pack or bag of ice wrapped in a thin towel. Never place a cold source directly on the skin.  Follow-up care  Follow up with your healthcare provider as advised.  When to seek medical advice  Call your healthcare provider right away if any of these occur:    Fever of 100.4 F (38 C) or higher, or as directed by your provider    Shaking chills    Symptoms worsen or do not improve within 48 to 72 hours of starting treatment    New symptoms develop  Date Last Reviewed: 7/30/2015 2000-2017 [employer]. [address] [place] [address], [place] [address]. All rights reserved. This information is not intended as a substitute for professional medical care. Always follow your healthcare professional's instructions.

## 2017-09-18 NOTE — MR AVS SNAPSHOT
After Visit Summary   9/18/2017    Ramya BALBUENA    MRN: [id number]           Patient Information     Date Of Birth          1980        Visit Information        Provider Department      9/18/2017 9:30 AM Chicho Madrigal PA-C [facility]        Today's Diagnoses     Mastitis in female    -  1    Breast pain, right        Fever, unspecified          Care Instructions      Mastitis  Mastitis occurs when breast tissue becomes swollen and inflamed. This is almost always due to infection. Mastitis most often affects breastfeeding women during the first 6 weeks after childbirth. For this reason, it s also known as lactation mastitis. Infection may happen after a duct becomes clogged, causing milk to back up in the breast. Mastitis may also occur if bacteria enter the breast through small cracks in the nipple. (Less often, mastitis occurs in women who aren t breastfeeding. If you have mastitis that is not due to breastfeeding, your healthcare provider will give you more information as needed. Treatment may include some of the same home care measures listed below.)  Mastitis may cause flu-like symptoms such as fever, aches, and fatigue. The affected breast may feel painful, warm, tender, firm, or swollen. The skin over the breast may be red (often in a wedge-shaped pattern). You may feel a burning a sensation when breastfeeding.  In most cases, mastitis can be treated with antibiotics. This should clear the infection. If treatment is delayed, a pocket of pus (abscess) can form in the breast tissue. A procedure may then be needed to drain the pus. In severe cases of infection, other treatments may be needed.  Home care  Breastfeeding    It s very important to keep the milk flowing from the infected breast. Continue breastfeeding from both breasts as usual. This will not hurt the baby. If this is too painful, use a breast pump to remove milk from the infected side. This can  be fed to your baby or discarded. Note: If you don't continue to breastfeed or pump your breast, bacteria can grow in the milk that is left in your breast. This can make your infection worse.    Tell your healthcare provider if you have problems with breastfeeding. He or she may suggest changes to your technique, if needed. You may also be referred to a lactation nurse or consultant for support with breastfeeding.  General care    Take any medicines you re prescribed as directed. If you re taking antibiotics, be sure to complete all of the medicine even if you start to feel better. Over-the-counter pain medicines may also be recommended. Don t use breast creams or other products or medicines without talking to your healthcare provider first. Note: If you re concerned about taking medicines while breastfeeding, talk to your healthcare provider.    Rest as often as needed. Also be sure to drink plenty of fluids.    To help relieve pain and swelling, heat or ice may be used. Apply as often as directed by your provider.    Heat: Place a warm compress on the breast. Use a towel soaked in hot water, a heating pad, or a hot water bottle.    Cold: Place a cold compress on the breast. Use an ice pack or bag of ice wrapped in a thin towel. Never place a cold source directly on the skin.  Follow-up care  Follow up with your healthcare provider as advised.  When to seek medical advice  Call your healthcare provider right away if any of these occur:    Fever of 100.4 F (38 C) or higher, or as directed by your provider    Shaking chills    Symptoms worsen or do not improve within 48 to 72 hours of starting treatment    New symptoms develop  Date Last Reviewed: 7/30/2015 2000-2017 [employer]. [address] [place] [address], [place] [address]. All rights reserved. This information is not intended as a substitute for professional medical care. Always follow your healthcare professional's instructions.                 Follow-ups after your visit        Who to contact     If you have questions or need follow up information about today's clinic visit or your schedule please contact [facility] directly at [phone].  Normal or non-critical lab and imaging results will be communicated to you by MyChart, letter or phone within 4 business days after the clinic has received the results. If you do not hear from us within 7 days, please contact the clinic through [email or web address] or phone. If you have a critical or abnormal lab result, we will notify you by phone as soon as possible.  Submit refill requests through H[employer] or call your pharmacy and they will forward the refill request to us. Please allow 3 business days for your refill to be completed.          Additional Information About Your Visit        MyChart Information     [employer] gives you secure access to your electronic health record. If you see a primary care provider, you can also send messages to your care team and make appointments. If you have questions, please call your primary care clinic.  If you do not have a primary care provider, please call [phone] and they will assist you.        Care EveryWhere ID     This is your Care EveryWhere ID. This could be used by other organizations to access your [place] medical records  [id number]        Your Vitals Were     Pulse Temperature Pulse Oximetry BMI (Body Mass Index)          104 102.4  F (39.1  C) (Oral) 100% 37.98 kg/m2         Blood Pressure from Last 3 Encounters:   09/18/17 120/80   03/29/17 118/70   02/13/17 112/70    Weight from Last 3 Encounters:   09/18/17 201 lb (91.2 kg)   03/29/17 202 lb (91.6 kg)   02/11/17 228 lb (103.4 kg)              Today, you had the following     No orders found for display         Today's Medication Changes          These changes are accurate as of: 9/18/17 10:49 AM.  If you have any questions, ask your nurse or doctor.               Start taking these  medicines.        Dose/Directions    cefPROZIL 250 MG/5ML suspension   Commonly known as:  CEFZIL   Used for:  Mastitis in female, Breast pain, right   Started by:  Chicho Madrigal PA-C        10 ml po bid for 10 days   Quantity:  200 mL   Refills:  0            Where to get your medicines      These medications were sent to [place] [id number] [facility] - Formerly named [facility] [address]  [address], [facility] [address]     Phone:  [phone]     cefPROZIL 250 MG/5ML suspension                Primary Care Provider Office Phone # Fax #    Naida Elliott [phone] [address] [address]        Equal Access to Services     ABELARDO VENCES : Hadii aad ku hadasho Soomaali, waaxda luqadaha, qaybta kaalmada adeegyada, waxay idiin haymateo vidal . So [facility] [phone].    ATENCIÓN: Si habla español, tiene a masters disposición servicios gratuitos de asistencia lingüística. Ll[facility] [phone].    We comply with applicable federal civil rights laws and [place] laws. We do not discriminate on the basis of race, color, national origin, age, disability sex, sexual orientation or gender identity.            Thank you!     Thank you for choosing [facility]  for your care. Our goal is always to provide you with excellent care. Hearing back from our patients is one way we can continue to improve our services. Please take a few minutes to complete the written survey that you may receive in the mail after your visit with us. Thank you!             Your Updated Medication List - Protect others around you: Learn how to safely use, store and throw away your medicines at www.disposemymeds.org.          This list is accurate as of: 9/18/17 10:49 AM.  Always use your most recent med list.                   Brand Name Dispense Instructions for use Diagnosis    cefPROZIL 250 MG/5ML suspension    CEFZIL    200 mL    10 ml po bid for 10 days     Mastitis in female, Breast pain, right       order for DME     1 Device    Equipment being ordered: double electric breast pump    Exclusively breastfeed infant       PRENATAL VITAMINS PO

## 2018-03-07 NOTE — MR AVS SNAPSHOT
"              After Visit Summary   3/29/2017    Ramya BALBUENA    MRN: [id number]           Patient Information     Date Of Birth          1980        Visit Information        Provider Department      3/29/2017 9:30 AM Naida Elliott, DO [facility]        Today's Diagnoses     Routine postpartum follow-up    -  1       Follow-ups after your visit        Who to contact     If you have questions or need follow up information about today's clinic visit or your schedule please contact [facility] directly at [phone].  Normal or non-critical lab and imaging results will be communicated to you by Full Color Gameshart, letter or phone within 4 business days after the clinic has received the results. If you do not hear from us within 7 days, please contact the clinic through [employer]t or phone. If you have a critical or abnormal lab result, we will notify you by phone as soon as possible.  Submit refill requests through [employer] or call your pharmacy and they will forward the refill request to us. Please allow 3 business days for your refill to be completed.          Additional Information About Your Visit        MyChart Information     [employer] gives you secure access to your electronic health record. If you see a primary care provider, you can also send messages to your care team and make appointments. If you have questions, please call your primary care clinic.  If you do not have a primary care provider, please call [phone] and they will assist you.        Care EveryWhere ID     This is your Care EveryWhere ID. This could be used by other organizations to access your [place] medical records  [id number]        Your Vitals Were     Pulse Temperature Height Last Period Pulse Oximetry BMI (Body Mass Index)    83 97.7  F (36.5  C) (Oral) 5' 1\" (1.549 m) 05/08/2016 (LMP Unknown) 99% 38.17 kg/m2       Blood Pressure from Last 3 Encounters:   03/29/17 118/70   02/13/17 112/70   02/06/17 118/80    " Weight from Last 3 Encounters:   03/29/17 202 lb (91.6 kg)   02/11/17 228 lb (103.4 kg)   02/06/17 228 lb (103.4 kg)              We Performed the Following     OB HEMOGLOBIN          Today's Medication Changes          These changes are accurate as of: 3/29/17 10:13 AM.  If you have any questions, ask your nurse or doctor.               Stop taking these medicines if you haven't already. Please contact your care team if you have questions.     oxyCODONE 5 MG IR tablet   Commonly known as:  ROXICODONE   Stopped by:  Naida Elliott DO           senna-docusate 8.6-50 MG per tablet   Commonly known as:  SENOKOT-S;PERICOLACE   Stopped by:  Naida Elliott DO           simethicone 80 MG chewable tablet   Commonly known as:  MYLICON   Stopped by:  Naida Elliott DO                    Primary Care Provider Office Phone # Fax #    Naida Elliott [phone]       [facility] [address] [address]        Thank you!     Thank you for choosing [facility]  for your care. Our goal is always to provide you with excellent care. Hearing back from our patients is one way we can continue to improve our services. Please take a few minutes to complete the written survey that you may receive in the mail after your visit with us. Thank you!             Your Updated Medication List - Protect others around you: Learn how to safely use, store and throw away your medicines at www.disposemymeds.org.          This list is accurate as of: 3/29/17 10:13 AM.  Always use your most recent med list.                   Brand Name Dispense Instructions for use    order for DME     1 Device    Equipment being ordered: double electric breast pump       PRENATAL VITAMINS PO             0

## 2019-04-24 ENCOUNTER — VIRTUAL VISIT (OUTPATIENT)
Dept: FAMILY MEDICINE | Facility: OTHER | Age: 39
End: 2019-04-24

## 2019-04-24 NOTE — PROGRESS NOTES
"Date:   Clinician: Lisa Landers  Clinician NPI: [id number]  Patient: Ramya Dale  Patient : 1980  Patient Address: [address] [place] [address], [place] [address]  Patient Phone: [phone]  Visit Protocol: Eye conditions  Patient Summary:  Ramya is a 38 year old (: 1980 ) female who initiated a Visit for conjunctivitis.  When asked the question \"Please sign me up to receive news, health information and promotions. \", Ramya responded \"No\".   A synchronous visit is necessary because the patient reported the following abnormal symptoms:   Sensitivity to light (requires clarification)   Images of her eye condition were uploaded.   Her symptoms started 1-2 days ago and affect both eyes. The symptoms consist of drainage coming from the eye(s), eye redness, eyelid swelling, light sensitivity, and itchy eye(s). Additionally, her vision has become more blurry since her symptoms started, but it's possible the blurry vision is caused by the eyelid swelling and/or drainage coming from the eye(s).   Symptom details     Drainage: The color of the drainage coming out of her eye(s) is yellow. The drainage is thick and causes her eyelids to be stuck shut in the morning.    Itchiness: Ramya has seasonal allergies or hay fever.     Denied symptoms include bumps on the eyelid and eye pain. Ramya does not have subconjunctival hemorrhage. She does not feel feverish.   Precipitating events  In the past 2 weeks, she has had a cold or an ear infection.   Ramya has not had a recent diagnosis of conjunctivitis. She also has not had a recent eye surgery, foreign body in the eye(s), and eye injury. It is not known if Ramya has recently been exposed to someone with a red eye or an eye infection. She does not wear contact lenses.   Pertinent medical history  Ramya has not ever been diagnosed with glaucoma.   Ramya is not taking medication to treat her current symptoms.   Ramya requires proof of evaluation of her eye " condition before returning to school, work, or .   Ramya does not smoke or use smokeless tobacco.   She is not sure if she is pregnant and denies breastfeeding. She has menstruated in the past month.   MEDICATIONS: No current medications, ALLERGIES: NKDA  Clinician Response:  Dear Ramya,  Based on the information provided, you most likely have bacterial conjunctivitis, more commonly called pink eye.  Medication information  I am prescribing:  Polymyxin B sulf-trimethoprim (Polytrim) 10,000 unit- 1 mg/mL ophthalmic (eye) drops. Apply 1 drop into the affected eye(s) every 3 hours, up to 6 times a day for 7 days. There are no refills with this prescription.  The medication I prescribed is an antibiotic medication. Infections can be caused by either bacteria or a virus, and often have similar symptoms, so it is possible that this is a viral infection. Antibiotics are only effective against bacterial infections, so when it is caused by a virus, the medication will not help symptoms improve or make it less contagious.  Self care  To reduce the spread of the eye infection, you should not use eye makeup until the infection has fully resolved, and be sure to wash your hands at least once per hour and avoid touching the eyes as much as possible.  The following will reduce the risk for future eye infections:     Frequent handwashing    Replace towels and washcloths daily    Do not share towels and washcloths with others    Replace eye makeup used while eyes were infected    Do not use anyone else's eye makeup     Steps you can take to be as comfortable as possible:     Avoid rubbing your eyes    Apply a cool compress to the eye(s)    Take regular breaks and remember to blink regularly when reading or using a computer for long periods of time    Wear sunglasses when outside    Wear eye protection when swimming or working with chemicals    Use good lighting     When to seek care  Please make an appointment to be seen in a  clinic or urgent care if any of the following occurs:     You develop new symptoms or your symptoms becomes worse.    Your symptoms do not improve within 2 days of starting treatment.     Proof of evaluation  I understand you require proof of evaluation and treatment by a healthcare provider. The statement below summarizes my evaluation and when to return to work, school, or . Please print a copy of this Visit if written verification is needed.  I have diagnosed Ramya with bacterial conjunctivitis and have prescribed antibiotics. Ramya can return to work, school, or  24 hours after the start of treatment and when discharge from the eye is not present.   Diagnosis: Bacterial conjunctivitis  Diagnosis ICD: H10.9  Triage Notes: I reviewed the patient's history, verified their identity, and explained the phone visit process.    looking directly at the light more sensitive but no vision changes.  Synchronous Triage: phone, status: completed, duration: 222 seconds  Prescription: polymyxin B sulf-trimethoprim (Polytrim) 10,000 unit- 1 mg/mL ophthalmic (eye) drops 1 10 ml dropper bottle, 7 days supply. Apply 1 drop into the affected eye(s) every 3 hours up to 6 times a day for 7 days. Refills: 0, Refill as needed: no, Allow substitutions: yes  Pharmacy: [id number] - [phone] - [address] [place] [address]

## 2019-06-14 ENCOUNTER — OFFICE VISIT (OUTPATIENT)
Dept: FAMILY MEDICINE | Facility: CLINIC | Age: 39
End: 2019-06-14
Payer: COMMERCIAL

## 2019-06-14 VITALS
OXYGEN SATURATION: 100 % | SYSTOLIC BLOOD PRESSURE: 98 MMHG | DIASTOLIC BLOOD PRESSURE: 64 MMHG | BODY MASS INDEX: 40.46 KG/M2 | HEIGHT: 61 IN | TEMPERATURE: 98.5 F | WEIGHT: 214.3 LBS | RESPIRATION RATE: 16 BRPM | HEART RATE: 64 BPM

## 2019-06-14 DIAGNOSIS — N91.2 AMENORRHEA: ICD-10-CM

## 2019-06-14 DIAGNOSIS — Z32.01 PREGNANCY TEST POSITIVE: Primary | ICD-10-CM

## 2019-06-14 LAB — HCG UR QL: POSITIVE

## 2019-06-14 PROCEDURE — 99213 OFFICE O/P EST LOW 20 MIN: CPT | Performed by: FAMILY MEDICINE

## 2019-06-14 PROCEDURE — 81025 URINE PREGNANCY TEST: CPT | Performed by: FAMILY MEDICINE

## 2019-06-14 ASSESSMENT — MIFFLIN-ST. JEOR: SCORE: 1594.44

## 2019-06-14 NOTE — PROGRESS NOTES
"Subjective     Ramya BABLUENA is a 39 year old female who presents to clinic today for the following health issues:    HPI   Patient here today for pregnancy confirmation-    LMP: 2019  First UPT was around 19  Nausea - and fatigue  No cramping or vaginal bleeding or spotting    Will start PNV    Headed to [place]  Debbie  On    Zika area positive -     -------------------------------------    Patient Active Problem List   Diagnosis     Papanicolaou smear of cervix with low grade squamous intraepithelial lesion (LGSIL)     BMI >30     Past Surgical History:   Procedure Laterality Date      SECTION N/A 2014    Procedure:  SECTION;  Surgeon: Maribel Nixon MD;  Location: [facility]      SECTION N/A 2017    Procedure:  SECTION;  Surgeon: Diane Anderson MD;  Location: [facility]     HC EXC LESION OF TONGUE W/O CLOSURE      benign       Social History     Tobacco Use     Smoking status: Never Smoker     Smokeless tobacco: Never Used   Substance Use Topics     Alcohol use: Yes     Alcohol/week: 0.0 oz     Comment: socially/not  now      Family History   Problem Relation Age of Onset     Heart Disease Paternal Grandfather      Heart Disease Maternal Grandfather      Allergies Mother      Eye Disorder Mother      Allergies Maternal Grandmother      Cerebrovascular Disease Maternal Grandmother 92     Depression Maternal Uncle            -------------------------------------  Reviewed and updated as needed this visit by Provider  Tobacco  Allergies  Meds  Problems  Med Hx  Surg Hx  Fam Hx         Review of Systems   ROS COMP: Constitutional, HEENT, cardiovascular, pulmonary, GI, , musculoskeletal, neuro, skin, endocrine and psych systems are negative, except as otherwise noted.      Objective    BP 98/64   Pulse 64   Temp 98.5  F (36.9  C) (Oral)   Resp 16   Ht 1.549 m (5' 1\")   Wt 97.2 kg (214 lb 4.8 oz)   LMP 2019   SpO2 100%   BMI 40.49 " "kg/m    Body mass index is 40.49 kg/m .  Physical Exam   GENERAL: healthy, alert and no distress    Diagnostic Test Results:  Labs reviewed in Epic  Results for orders placed or performed in visit on 06/14/19 (from the past 24 hour(s))   HCG Qual, Urine ([id number])   Result Value Ref Range    HCG Qual Urine Positive (A) NEG^Negative           Assessment & Plan     1. Pregnancy test positive  Positive pregnancy test  Discussed starting PNV -   Information given about medications OK in pregnancy and  Morning sickness  She is going to [facility] for travel - has upcoming appt-   Only non yellow fever areas will be visiting  Discussed zika and testing after return  Schedule first OB visit mid July 2. Amenorrhea     - HCG Qual, Urine ([id number])     BMI:   Estimated body mass index is 40.49 kg/m  as calculated from the following:    Height as of this encounter: 1.549 m (5' 1\").    Weight as of this encounter: 97.2 kg (214 lb 4.8 oz).           Pt will call or RTC if symptoms worsen or do not improve.     Return in about 1 month (around 7/12/2019) for first OB visit.    Naida Elliott,   [facility]      "

## 2019-06-14 NOTE — NURSING NOTE
"Chief Complaint   Patient presents with     Confirmation Of Pregnancy     BP 98/64   Pulse 64   Temp 98.5  F (36.9  C) (Oral)   Resp 16   Ht 1.549 m (5' 1\")   Wt 97.2 kg (214 lb 4.8 oz)   LMP 05/04/2019   SpO2 100%   BMI 40.49 kg/m   Estimated body mass index is 40.49 kg/m  as calculated from the following:    Height as of this encounter: 1.549 m (5' 1\").    Weight as of this encounter: 97.2 kg (214 lb 4.8 oz).  Medication Reconciliation: complete        Health Maintenance Due Pending Provider Review:  Physical Exam    Pt is already scheduled for Physical on 6/21/2019.    Lita Luke MA  [facility]  [phone]  "

## 2019-06-20 ENCOUNTER — OFFICE VISIT (OUTPATIENT)
Dept: FAMILY MEDICINE | Facility: CLINIC | Age: 39
End: 2019-06-20
Payer: COMMERCIAL

## 2019-06-20 VITALS — WEIGHT: 216.6 LBS | HEIGHT: 61 IN | BODY MASS INDEX: 40.89 KG/M2 | TEMPERATURE: 98.5 F

## 2019-06-20 DIAGNOSIS — Z71.84 TRAVEL ADVICE ENCOUNTER: Primary | ICD-10-CM

## 2019-06-20 PROCEDURE — 90471 IMMUNIZATION ADMIN: CPT | Mod: GA | Performed by: NURSE PRACTITIONER

## 2019-06-20 PROCEDURE — 90632 HEPA VACCINE ADULT IM: CPT | Mod: GA | Performed by: NURSE PRACTITIONER

## 2019-06-20 PROCEDURE — 90691 TYPHOID VACCINE IM: CPT | Mod: GA | Performed by: NURSE PRACTITIONER

## 2019-06-20 PROCEDURE — 90472 IMMUNIZATION ADMIN EACH ADD: CPT | Mod: GA | Performed by: NURSE PRACTITIONER

## 2019-06-20 PROCEDURE — 99402 PREV MED CNSL INDIV APPRX 30: CPT | Mod: 25 | Performed by: NURSE PRACTITIONER

## 2019-06-20 RX ORDER — AZITHROMYCIN 500 MG/1
500 TABLET, FILM COATED ORAL DAILY
Qty: 3 TABLET | Refills: 0 | Status: SHIPPED | OUTPATIENT
Start: 2019-06-20 | End: 2019-08-28

## 2019-06-20 ASSESSMENT — MIFFLIN-ST. JEOR: SCORE: 1604.87

## 2019-06-20 NOTE — PATIENT INSTRUCTIONS
Today June 20, 2019 you received the    Hepatitis A Vaccine - Please return on 12/17/19 or later for your 2nd and final dose.    Typhoid - injectable. This vaccine is valid for two years.   .    These appointments can be made as a NURSE ONLY visit.    **It is very important for the vaccinations to be given on the scheduled day(s), this helps ensure you receive the full effectiveness of the vaccine.**    Please call [facility] with any questions [phone]    Thank you for visiting [facility]

## 2019-06-20 NOTE — PROGRESS NOTES
"  Itinerary:  [place]    Departure Date: 6/27/2019    Return Date: 7/11/2019    Length of Trip: 2 weeks    Purpose of Trip: Pleasure    Urban/Rural: Urban    Accommodations: Apartment    IMMUNIZATION HISTORY  Have you received any immunizations within the past 4 weeks?  No  Have you ever fainted from having your blood drawn or from an injection?  No  Have you ever had a fever reaction to vaccination?  No  Have you ever had any bad reaction or side effect from any vaccination?  No  Have you ever had hepatitis A or B vaccine?  No  Do you live (or work closely) with anyone who has AIDS, an AIDS-like condition, any other immune disorder or who is on chemotherapy for cancer or a   family history of immunodeficiency?  No  Have you received any injection of immune globulin or any blood products during the past 12 months?  No    Patient roomed by Lita Luke MA  Medical Assistant  [facility]          Special medical concerns: none    Temp 98.5  F (36.9  C) (Oral)   Ht 1.549 m (5' 1\")   Wt 98.2 kg (216 lb 9.6 oz)   BMI 40.93 kg/m    EXAM: deferred    Immunizations discussed include: Hepatitis A and Typhoid  Malaraia prophylaxis recommended: none  Symptomatic treatment for traveler's diarrhea: azithromycin    ASSESSMENT/PLAN:    ICD-10-CM    1. Travel advice encounter Z71.89 azithromycin (ZITHROMAX) 500 MG tablet     I have reviewed general recommendations for safe travel   including: food/water precautions, insect avoidance, safe sex   practices given high prevalence of HIV and other STDs,   roadway safety. Educational materials and [employer] report provided.    Total visit time 30 minutes with over 50% of time spent counseling patient.  "

## 2019-07-30 ENCOUNTER — PRENATAL OFFICE VISIT (OUTPATIENT)
Dept: FAMILY MEDICINE | Facility: CLINIC | Age: 39
End: 2019-07-30
Payer: COMMERCIAL

## 2019-07-30 VITALS
BODY MASS INDEX: 40.33 KG/M2 | TEMPERATURE: 99.2 F | SYSTOLIC BLOOD PRESSURE: 107 MMHG | HEIGHT: 61 IN | OXYGEN SATURATION: 100 % | HEART RATE: 71 BPM | DIASTOLIC BLOOD PRESSURE: 74 MMHG | WEIGHT: 213.6 LBS

## 2019-07-30 DIAGNOSIS — O09.529 SUPERVISION OF HIGH-RISK PREGNANCY OF ELDERLY MULTIGRAVIDA: ICD-10-CM

## 2019-07-30 DIAGNOSIS — Z12.4 SCREENING FOR MALIGNANT NEOPLASM OF CERVIX: Primary | ICD-10-CM

## 2019-07-30 LAB
ABO + RH BLD: NORMAL
ABO + RH BLD: NORMAL
BLD GP AB SCN SERPL QL: NORMAL
BLOOD BANK CMNT PATIENT-IMP: NORMAL
ERYTHROCYTE [DISTWIDTH] IN BLOOD BY AUTOMATED COUNT: 14.3 % (ref 10–15)
HBV SURFACE AG SERPL QL IA: NONREACTIVE
HCT VFR BLD AUTO: 37.7 % (ref 35–47)
HGB BLD-MCNC: 12.6 G/DL (ref 11.7–15.7)
HIV 1+2 AB+HIV1 P24 AG SERPL QL IA: NONREACTIVE
MCH RBC QN AUTO: 28.4 PG (ref 26.5–33)
MCHC RBC AUTO-ENTMCNC: 33.4 G/DL (ref 31.5–36.5)
MCV RBC AUTO: 85 FL (ref 78–100)
PLATELET # BLD AUTO: 307 10E9/L (ref 150–450)
RBC # BLD AUTO: 4.43 10E12/L (ref 3.8–5.2)
SPECIMEN EXP DATE BLD: NORMAL
WBC # BLD AUTO: 10.8 10E9/L (ref 4–11)

## 2019-07-30 PROCEDURE — 86850 RBC ANTIBODY SCREEN: CPT | Performed by: FAMILY MEDICINE

## 2019-07-30 PROCEDURE — 87340 HEPATITIS B SURFACE AG IA: CPT | Performed by: FAMILY MEDICINE

## 2019-07-30 PROCEDURE — 99207 ZZC FIRST OB VISIT: CPT | Performed by: FAMILY MEDICINE

## 2019-07-30 PROCEDURE — G0145 SCR C/V CYTO,THINLAYER,RESCR: HCPCS | Performed by: FAMILY MEDICINE

## 2019-07-30 PROCEDURE — 86780 TREPONEMA PALLIDUM: CPT | Performed by: FAMILY MEDICINE

## 2019-07-30 PROCEDURE — 86901 BLOOD TYPING SEROLOGIC RH(D): CPT | Performed by: FAMILY MEDICINE

## 2019-07-30 PROCEDURE — 86762 RUBELLA ANTIBODY: CPT | Performed by: FAMILY MEDICINE

## 2019-07-30 PROCEDURE — 87086 URINE CULTURE/COLONY COUNT: CPT | Performed by: FAMILY MEDICINE

## 2019-07-30 PROCEDURE — 87624 HPV HI-RISK TYP POOLED RSLT: CPT | Performed by: FAMILY MEDICINE

## 2019-07-30 PROCEDURE — 36415 COLL VENOUS BLD VENIPUNCTURE: CPT | Performed by: FAMILY MEDICINE

## 2019-07-30 PROCEDURE — 85027 COMPLETE CBC AUTOMATED: CPT | Performed by: FAMILY MEDICINE

## 2019-07-30 PROCEDURE — 86900 BLOOD TYPING SEROLOGIC ABO: CPT | Performed by: FAMILY MEDICINE

## 2019-07-30 PROCEDURE — 87389 HIV-1 AG W/HIV-1&-2 AB AG IA: CPT | Performed by: FAMILY MEDICINE

## 2019-07-30 ASSESSMENT — MIFFLIN-ST. JEOR: SCORE: 1591.26

## 2019-07-30 NOTE — LETTER
pap  August 8, 2019    Ramya BALBUENA  [address] [facility] [address]    Dear ,  This letter is regarding your recent Pap smear (cervical cancer screening) and Human Papillomavirus (HPV) test.  We are happy to inform you that your Pap smear result is normal. Cervical cancer is closely linked with certain types of HPV. Your results showed no evidence of high-risk HPV.  We recommend you have your next PAP smear and HPV test in 5 years.  You will still need to return to the clinic every year for an annual exam and other preventive tests.  If you have additional questions regarding this result, please call our registered nurse, Marianne at [phone].  Sincerely,    Naida Elliott DO /jennie

## 2019-07-30 NOTE — PROGRESS NOTES
SUBJECTIVE:     HPI:    This is a 39 year old female patient,  who presents for her first obstetrical visit.    CHRIS: .  She is 12+3 weeks.  Her cycles are regular.  Her last menstrual period was normal.   Since her LMP, she has had no complaints).   She denies nausea and vaginal bleeding.    Additional History:     Have you travelled during the pregnancy?Yes, If yes, where? [place] if yes, who? Patient and her  and kids - I contacted the [facility] - zika provider.  They recommended against blood test - due to false positive and negatives but did recommend using condoms the rest of pregnancy.  They also suggested ultrasound in late second or early third trimester.    Have your sexual partner(s) travelled during the pregnancy?Yes, If yes, where? [place] if yes, who?        HISTORY:   Planned Pregnancy: Yes  Marital Status:   Living in Household: Spouse and Children    Past History:  Her past medical history is non-contributory.      She has a history of  prior  section    Since her last LMP she denies use of alcohol, tobacco and street drugs.    Past medical, surgical, social and family history were reviewed and updated in [facility].        Current Outpatient Medications   Medication     order for DME     Prenatal Multivit-Min-Fe-FA (PRENATAL VITAMINS PO)     No current facility-administered medications for this visit.        ROS:   12 point review of systems negative other than symptoms noted below.      OBJECTIVE:     EXAM:  LMP 2019  There is no height or weight on file to calculate BMI.    GENERAL: alert, no distress and over weight  EYES: Eyes grossly normal to inspection, PERRL and conjunctivae and sclerae normal  HENT: ear canals and TM's normal, nose and mouth without ulcers or lesions  NECK: no adenopathy, no asymmetry, masses, or scars and thyroid normal to palpation  RESP: lungs clear to auscultation - no rales, rhonchi or wheezes  BREAST: normal without masses, tenderness or  nipple discharge and no palpable axillary masses or adenopathy  CV: regular rate and rhythm, normal S1 S2, no S3 or S4, no murmur, click or rub, no peripheral edema and peripheral pulses strong  ABDOMEN: soft, nontender, no hepatosplenomegaly, no masses and bowel sounds normal   (female): normal female external genitalia, normal urethral meatus , vaginal mucosa pink, moist, well rugated and 12 week uterus   's  MS: no gross musculoskeletal defects noted, no edema  SKIN: no suspicious lesions or rashes  NEURO: Normal strength and tone, mentation intact and speech normal  PSYCH: mentation appears normal, affect normal/bright    ASSESSMENT/PLAN:       ICD-10-CM    1. Screening for malignant neoplasm of cervix Z12.4 Pap imaged thin layer screen with HPV - recommended age 30 - 65 years (select HPV order below)     (Z12.4) Screening for malignant neoplasm of cervix  (primary encounter diagnosis)  Comment:    Plan: Pap imaged thin layer screen with HPV -         recommended age 30 - 65 years (select HPV order        below), HPV High Risk Types DNA Cervical             (O09.529) Supervision of high-risk pregnancy of elderly multigravida  Comment:    Plan: ABO/Rh type and screen, Hepatitis B surface         antigen, CBC with platelets, HIV Antigen         Antibody Combo, Rubella Antibody IgG         Quantitative, Treponema Abs w Reflex to RPR and        Titer, Urine Culture Aerobic Bacterial, MAT         [facility] REFERRAL-PREGNANCY            39 year old , Unknown weeks of pregnancy with CHRIS of Not found.    Discussed as follows:     Counseling given:   - Follow up in 4-6 weeks for return OB visit.  - Recommended weight gain for pregnancy:15lbs.         PLAN/PATIENT INSTRUCTIONS:  First trimester screening discussed  F/u 4 weeks     Naida Elliott, DO  Important Information for Provider: travel to [place]    Prenatal OB Questionnaire      Allergies as of 2019:    Allergies as of 2019 - Reviewed  07/30/2019   Allergen Reaction Noted     Cats  03/17/2008     Pollen extract  09/25/2012     Codeine Nausea 06/29/2014       Current medications are:  Current Outpatient Medications   Medication Sig Dispense Refill     order for DME Equipment being ordered: double electric breast pump (Patient not taking: Reported on 6/14/2019) 1 Device 0     Prenatal Multivit-Min-Fe-FA (PRENATAL VITAMINS PO)            Early ultrasound screening tool:    Does patient have irregular periods?  No  Did patient use hormonal birth control in the three months prior to positive urine pregnancy test? No  Is the patient breastfeeding?  No  Is the patient 10 weeks or greater at time of education visit?  No

## 2019-07-31 LAB
BACTERIA SPEC CULT: NORMAL
RUBV IGG SERPL IA-ACNC: 58 IU/ML
SPECIMEN SOURCE: NORMAL
T PALLIDUM AB SER QL: NONREACTIVE

## 2019-08-01 ENCOUNTER — TELEPHONE (OUTPATIENT)
Dept: FAMILY MEDICINE | Facility: CLINIC | Age: 39
End: 2019-08-01

## 2019-08-01 NOTE — TELEPHONE ENCOUNTER
PN,   Please see below message  I see you placed MFM referral and noted in there cell free DNA - no order for DNA lab though  Please advise  Thanks,  Susan GODFREY RN

## 2019-08-01 NOTE — TELEPHONE ENCOUNTER
Reason for Call:  Procedure Code    Detailed comments: Niya needs the Procedure code for  DNA Blood Test  [facility] [phone]      Call taken on 8/1/2019 at 12:54 PM by Ward Anaya

## 2019-08-02 ENCOUNTER — PRE VISIT (OUTPATIENT)
Dept: MATERNAL FETAL MEDICINE | Facility: CLINIC | Age: 39
End: 2019-08-02

## 2019-08-02 LAB
COPATH REPORT: NORMAL
PAP: NORMAL

## 2019-08-02 NOTE — TELEPHONE ENCOUNTER
Insurance company calling  Called them back - first person not able to help me   He had no idea what below was about  Second representative couldn't even pull pt up   Will disregard at the moment  Did sent [place] to pt though to give her a heads up to make sure contacts insurance for appt/tests so no surprise rich GODFREY RN

## 2019-08-05 ENCOUNTER — HOSPITAL ENCOUNTER (OUTPATIENT)
Dept: ULTRASOUND IMAGING | Facility: CLINIC | Age: 39
Discharge: HOME | End: 2019-08-05
Attending: FAMILY MEDICINE | Admitting: FAMILY MEDICINE
Payer: COMMERCIAL

## 2019-08-05 ENCOUNTER — OFFICE VISIT (OUTPATIENT)
Dept: MATERNAL FETAL MEDICINE | Facility: CLINIC | Age: 39
End: 2019-08-05
Attending: FAMILY MEDICINE
Payer: COMMERCIAL

## 2019-08-05 DIAGNOSIS — O26.90 PREGNANCY RELATED CONDITION, ANTEPARTUM: ICD-10-CM

## 2019-08-05 DIAGNOSIS — O09.521 SUPERVISION OF ELDERLY MULTIGRAVIDA IN FIRST TRIMESTER: ICD-10-CM

## 2019-08-05 DIAGNOSIS — O09.521 MULTIGRAVIDA OF ADVANCED MATERNAL AGE IN FIRST TRIMESTER: Primary | ICD-10-CM

## 2019-08-05 DIAGNOSIS — O09.521 SUPERVISION OF ELDERLY MULTIGRAVIDA IN FIRST TRIMESTER: Primary | ICD-10-CM

## 2019-08-05 PROCEDURE — 36415 COLL VENOUS BLD VENIPUNCTURE: CPT | Performed by: OBSTETRICS & GYNECOLOGY

## 2019-08-05 PROCEDURE — 76813 OB US NUCHAL MEAS 1 GEST: CPT

## 2019-08-05 PROCEDURE — 40000791 ZZHCL STATISTIC VERIFI PRENATAL TRISOMY 21,18,13: Performed by: OBSTETRICS & GYNECOLOGY

## 2019-08-05 PROCEDURE — 96040 ZZH GENETIC COUNSELING, EACH 30 MINUTES: CPT | Mod: ZF | Performed by: GENETIC COUNSELOR, MS

## 2019-08-05 NOTE — PROGRESS NOTES
"Please see \"Imaging\" tab under \"Chart Review\" for details of today's US.    Virginia Del Valle, DO    "

## 2019-08-05 NOTE — PROGRESS NOTES
[facility]  Genetic Counseling Consult    Patient: Ramya BALBUENA YOB: 1980   Date of Service: 19      Ramya BALBUENA was seen at [facility] for genetic consultation to discuss the options for screening and testing for fetal chromosome abnormalities.  The indication for genetic counseling is advanced maternal age.  She was accompanied by her , Aditya.        Impression/Plan:   1.  Ramya had an ultrasound and blood draw for NIPT (Innatal test through [facility]).  Results are expected within 7-10 days, and will be available in [place].  We will contact her to discuss the results, and a copy will be forwarded to the office of the referring OB provider. Ramya BALBUENA provided verbal permission for results to be left on her voicemail at [phone]. She requested the results do include the sex.     2.  Maternal serum AFP (single marker screen) is recommended after 15 weeks to screen for open neural tube defects. A quad screen should not be performed.    3.  An 18-20 week comprehensive ultrasound is standard of care for all women 35 or older at delivery.    Pregnancy History:   /Parity:     Age at Delivery: 39 year old  CHRIS: 2020, by Last Menstrual Period  Gestational Age: 13w2d    No significant complications or exposures were reported in the current pregnancy.    Kathy travelled to [place] earlier in the pregnancy. They report no known mosquito bites or illness during or after their trip. They have been reccommended to use condoms throughout the pregnancy and have ultrasound early in third trimester     Ramya hutton pregnancy history is significant for two full term deliveries, one son and one daughter, both healthy     Medical History:   aRmya hutton reported medical history is not expected to impact pregnancy management or risks to fetal development.       Family History:   A  three-generation pedigree was obtained, and is scanned under the  Media  tab.   The following significant findings were reported by Ramya:    The father of the pregnancy, Aditya, is healthy. He has a diagnosis of ADHD and depression. He was born with pectus excavatum. He has no reported cardiac problems. The remaining family history if negative for pectus excavatum      Aditya and Ramya have two children. Their daughter, 2, is healthy. Their son,5, is healthy and has a diagnosis of ASD.     Ramya reports her maternal uncle also has a probably ASD diagnosis    Autism spectrum disorders (ASD) are characterized by abnormalities in language and social cognition. Individuals with ASD typically have difficulty with communication and interaction with others, intellectual disability, restricted interests, and repetitive behaviors. The cause of ASD is currently unknown. In less than 1% of all individuals with ASD, it is a feature of a certain genetic condition such as Down syndrome, fragile X syndrome, or Rett syndrome. However, in most cases the cause may be multifactorial which means a combination of genetic and environmental factors. The genetic causes or predispositions are being researched and many have been suggested. Currently, family and twin studies are most useful to provide recurrence risks to individuals with a family history of ASD. If an individual is affected with ASD there is an estimated 2-18.7% chance for a sibling of the individual to also be affected. Estimates for second degree relatives are currently unknown but could be elevated from the general population risk.       Ramya reports one of her paternal half-brothers may have had cancer in his knee but she is unsure.     Aditya reports his maternal grandmother  from a sudden death in her 50s.    Otherwise, the reported family history is negative for multiple miscarriages, stillbirths, birth defects, mental retardation, known genetic conditions, and  consanguinity.       Carrier Screening:   The patient reports that she and the father of the pregnancy have  ancestry:      Cystic fibrosis is an autosomal recessive genetic condition that occurs with increased frequency in individuals of  ancestry and carrier screening for this condition is available.  In addition,  screening in the [facility] includes cystic fibrosis.    The patient reports that she is of Afro- [place]  ancestry:       Expanded carrier screening for mutations in a large panel of genes associated with autosomal recessive conditions including cystic fibrosis, spinal muscular atrophy, and others, is now available.      Carrier screening was beyond the scope of our discussion today.        Risk Assessment for Chromosome Conditions:   We explained that the risk for fetal chromosome abnormalities increases with maternal age. We discussed specific features of common chromosome abnormalities, including Down syndrome, trisomy 13, trisomy 18, and sex chromosome trisomies.      At age 39 at midtrimester, the risk to have a baby with Down syndrome is 1 in 168.     At age 39 at midtrimester, the risk to have a baby with any chromosome abnormality is 1 in 82.        Testing Options:   We discussed the following options:   First trimester screening    First trimester ultrasound with nuchal translucency and nasal bone assessments, maternal plasma hCG, ROSALVA-A, and AFP measurement    Screens for fetal trisomy 21, trisomy 13, and trisomy 18    Cannot screen for open neural tube defects; maternal serum AFP after 15 weeks is recommended     Non-invasive Prenatal Testing (NIPT)    Maternal plasma cell-free DNA testing; first trimester ultrasound with nuchal translucency and nasal bone assessment is recommended, when appropriate    Screens for fetal trisomy 21, trisomy 13, trisomy 18, and sex chromosome aneuploidy    Cannot screen for open neural tube defects; maternal serum AFP after 15  weeks is recommended     Chorionic villus sampling (CVS)    Invasive procedure typically performed in the first trimester by which placental villi are obtained for the purpose of chromosome analysis and/or other prenatal genetic analysis    Diagnostic results; >99% sensitivity for fetal chromosome abnormalities    Cannot test for open neural tube defects; maternal serum AFP after 15 weeks is recommended     Genetic Amniocentesis    Invasive procedure typically performed in the second trimester by which amniotic fluid is obtained for the purpose of chromosome analysis and/or other prenatal genetic analysis    Diagnostic results; >99% sensitivity for fetal chromosome abnormalities    AFAFP measurement tests for open neural tube defects     Comprehensive (Level II) ultrasound: Detailed ultrasound performed between 18-22 weeks gestation to screen for major birth defects and markers for aneuploidy.    We reviewed the benefits and limitations of this testing.  Screening tests provide a risk assessment specific to the pregnancy for certain fetal chromosome abnormalities, but cannot definitively diagnose or exclude a fetal chromosome abnormality.  Follow-up genetic counseling and consideration of diagnostic testing is recommended with any abnormal screening result.     Diagnostic tests carry inherent risks- including risk of miscarriage- that require careful consideration.  These tests can detect fetal chromosome abnormalities with greater than 99% certainty.  Results can be compromised by maternal cell contamination or mosaicism, and are limited by the resolution of cytogenetic G-banding technology.  There is no screening nor diagnostic test that can detect all forms of birth defects or mental disability.     It was a pleasure to be involved with Ramya [facility]. Face-to-face time of the meeting was 45 minutes.    Antonia Rausch MS, [facility]  Licensed Genetic Counselor   [facility]   Maternal Fetal Medicine  [facility]  [email or web address]   Office: [phone] MFM: [phone]  Pager: [phone] Fax: [phone]

## 2019-08-06 LAB
FINAL DIAGNOSIS: NORMAL
HPV HR 12 DNA CVX QL NAA+PROBE: NEGATIVE
HPV16 DNA SPEC QL NAA+PROBE: NEGATIVE
HPV18 DNA SPEC QL NAA+PROBE: NEGATIVE
SPECIMEN DESCRIPTION: NORMAL
SPECIMEN SOURCE CVX/VAG CYTO: NORMAL

## 2019-08-12 ENCOUNTER — TELEPHONE (OUTPATIENT)
Dept: MATERNAL FETAL MEDICINE | Facility: CLINIC | Age: 39
End: 2019-08-12

## 2019-08-12 NOTE — TELEPHONE ENCOUNTER
Called and discussed normal NIPT results with Ramya. Results indicate NO ANEUPLOIDY DETECTED for chromosomes 21, 18, 13, or sex chromosomes (XY). Sex was disclosed per patient's wishes.     This puts her current pregnancy at low risk for Down syndrome, trisomy 18, trisomy 13 and sex chromosome abnormalities. This test is reported to have the following sensitivities: Down syndrome- 99%, trisomy 18- 98%, and trisomy 13- 98%. Although these results are reassuring, this does not replace a standard chromosome analysis from a chorionic villus sampling or amniocentesis.     Level II ultrasound is recommended, given AMA.     MSAFP is the appropriate second trimester screening test for open neural tube defects; the maternal quad screen is not recommended.     Her results are available in her Epic chart for her primary OB to review.    Antonia Rausch MS, [facility]  Licensed Genetic Counselor   [facility]   Maternal Fetal Medicine Centers  [email or web address]   Office: [phone] MFM: [phone]  Pager: [phone] Fax: [phone]

## 2019-08-13 LAB — LAB SCANNED RESULT: NORMAL

## 2019-08-28 ENCOUNTER — PRENATAL OFFICE VISIT (OUTPATIENT)
Dept: FAMILY MEDICINE | Facility: CLINIC | Age: 39
End: 2019-08-28
Payer: COMMERCIAL

## 2019-08-28 VITALS
BODY MASS INDEX: 40.69 KG/M2 | SYSTOLIC BLOOD PRESSURE: 101 MMHG | WEIGHT: 215.5 LBS | HEIGHT: 61 IN | OXYGEN SATURATION: 100 % | HEART RATE: 73 BPM | DIASTOLIC BLOOD PRESSURE: 69 MMHG | TEMPERATURE: 98 F

## 2019-08-28 DIAGNOSIS — O09.529 SUPERVISION OF HIGH-RISK PREGNANCY OF ELDERLY MULTIGRAVIDA: Primary | ICD-10-CM

## 2019-08-28 PROCEDURE — 36415 COLL VENOUS BLD VENIPUNCTURE: CPT | Performed by: FAMILY MEDICINE

## 2019-08-28 PROCEDURE — 99000 SPECIMEN HANDLING OFFICE-LAB: CPT | Performed by: FAMILY MEDICINE

## 2019-08-28 PROCEDURE — 82105 ALPHA-FETOPROTEIN SERUM: CPT | Mod: 90 | Performed by: FAMILY MEDICINE

## 2019-08-28 PROCEDURE — 99207 ZZC PRENATAL VISIT: CPT | Performed by: FAMILY MEDICINE

## 2019-08-28 ASSESSMENT — MIFFLIN-ST. JEOR: SCORE: 1599.88

## 2019-08-28 NOTE — PROGRESS NOTES
16w4d  Doing well - feeling more energy   Has not felt fetal movement yet  Discussed MS-AFP today,   Already scheduled for MFM fetal survey ultrasound  F/u with CNM around 20 weeks  PN

## 2019-08-30 LAB
# FETUSES US: NORMAL
# FETUSES: NORMAL
AFP ADJ MOM AMN: 0.91
AFP SERPL-MCNC: 25 NG/ML
AGE - REPORTED: 37.7 YR
CURRENT SMOKER: NORMAL
CURRENT SMOKER: NORMAL
DIABETES STATUS PATIENT: NORMAL
FAMILY MEMBER DISEASES HX: NO
FAMILY MEMBER DISEASES HX: NO
GA METHOD: NORMAL
GA METHOD: NORMAL
GA: NORMAL WK
IDDM PATIENT QL: NO
INTEGRATED SCN PATIENT-IMP: NORMAL
LMP START DATE: NORMAL
MONOCHORIONIC TWINS: NO
SERVICE CMNT-IMP: NO
SPECIMEN DRAWN SERPL: NORMAL
VALPROIC/CARBAMAZEPINE STATUS: NO
WEIGHT UNITS: NORMAL

## 2019-09-13 ENCOUNTER — HOSPITAL ENCOUNTER (OUTPATIENT)
Dept: ULTRASOUND IMAGING | Facility: CLINIC | Age: 39
Discharge: HOME | End: 2019-09-13
Attending: FAMILY MEDICINE | Admitting: FAMILY MEDICINE
Payer: COMMERCIAL

## 2019-09-13 ENCOUNTER — OFFICE VISIT (OUTPATIENT)
Dept: MATERNAL FETAL MEDICINE | Facility: CLINIC | Age: 39
End: 2019-09-13
Attending: FAMILY MEDICINE
Payer: COMMERCIAL

## 2019-09-13 DIAGNOSIS — E66.01 MORBID OBESITY (H): Primary | ICD-10-CM

## 2019-09-13 DIAGNOSIS — O26.90 PREGNANCY RELATED CONDITION, ANTEPARTUM: ICD-10-CM

## 2019-09-13 PROCEDURE — 76811 OB US DETAILED SNGL FETUS: CPT

## 2019-09-13 NOTE — PROGRESS NOTES
"Please see \"Imaging\" tab under Chart Review for full details.    Zulema Mosher MD  Maternal Fetal Medicine    "

## 2019-09-27 ENCOUNTER — PRENATAL OFFICE VISIT (OUTPATIENT)
Dept: MIDWIFE SERVICES | Facility: CLINIC | Age: 39
End: 2019-09-27
Payer: COMMERCIAL

## 2019-09-27 VITALS
SYSTOLIC BLOOD PRESSURE: 104 MMHG | HEART RATE: 72 BPM | BODY MASS INDEX: 41.57 KG/M2 | OXYGEN SATURATION: 99 % | DIASTOLIC BLOOD PRESSURE: 62 MMHG | WEIGHT: 220 LBS

## 2019-09-27 DIAGNOSIS — O34.219 PREVIOUS CESAREAN DELIVERY, ANTEPARTUM CONDITION OR COMPLICATION: Primary | ICD-10-CM

## 2019-09-27 DIAGNOSIS — O09.529 SUPERVISION OF HIGH-RISK PREGNANCY OF ELDERLY MULTIGRAVIDA: ICD-10-CM

## 2019-09-27 PROCEDURE — 99207 ZZC PRENATAL VISIT: CPT | Performed by: ADVANCED PRACTICE MIDWIFE

## 2019-09-27 NOTE — Clinical Note
Hi Joann,It was a pleasure to see your patient today.  CHARO is changing her CHRIS and I am not sure why but I don't think it will matter because she will have a scheduled C/s for end of January anyway.   She is scheduled for next visit with you on 10/25/19 and she should have her 1 hour GCT that day.  I had her schedule with our MD's for her November prenatal visit to discuss scheduling of her C/S.  One of our MD (Dr. Hernandez) just retired and Dr. Rueda is starting so the MD schedules have been tight, so 8 weeks out I thought she would be able to get into see the MD.   She is a aundrea person.  Thanks, Kim Tran CNM

## 2019-09-27 NOTE — PROGRESS NOTES
22w0d (based upon CHRIS change today)   patient usually sees FP at [place].  Had MFM U/S and patient is now very confused with CHRIS, MFM states 8 day discrepancy and wants to change CHRIS from 2/8/20 to 1/31/20, patient will have another U/S at end of October, enc. Pt to see [facility] MD to discuss and when to schedule C/S for prenatal visit in November.  Patient already has prenatal appt next month for 1 hour GCT with Dr. Elliott.  Patient in agreement with plan of care.   Discussed that C//s will be likely scheduled around the 39 th week so end of January anyway.  PTL reviewed.  Discussed weight gain and enc. 6 fruits/vegs daily, 8 glasses of water, exercise daily.   rtc in 4 weeks for 1 hour GCT with [place]

## 2019-10-01 ENCOUNTER — HEALTH MAINTENANCE LETTER (OUTPATIENT)
Age: 39
End: 2019-10-01

## 2019-10-25 ENCOUNTER — PRENATAL OFFICE VISIT (OUTPATIENT)
Dept: FAMILY MEDICINE | Facility: CLINIC | Age: 39
End: 2019-10-25
Payer: COMMERCIAL

## 2019-10-25 VITALS
TEMPERATURE: 97.7 F | DIASTOLIC BLOOD PRESSURE: 73 MMHG | HEIGHT: 61 IN | HEART RATE: 65 BPM | SYSTOLIC BLOOD PRESSURE: 110 MMHG | WEIGHT: 219.5 LBS | BODY MASS INDEX: 41.44 KG/M2

## 2019-10-25 DIAGNOSIS — O09.529 ANTEPARTUM MULTIGRAVIDA OF ADVANCED MATERNAL AGE: Primary | ICD-10-CM

## 2019-10-25 LAB
GLUCOSE 1H P 50 G GLC PO SERPL-MCNC: 94 MG/DL (ref 60–129)
HGB BLD-MCNC: 11.5 G/DL (ref 11.7–15.7)

## 2019-10-25 PROCEDURE — 96372 THER/PROPH/DIAG INJ SC/IM: CPT | Performed by: FAMILY MEDICINE

## 2019-10-25 PROCEDURE — 00000218 ZZHCL STATISTIC OBHBG - HEMOGLOBIN: Performed by: FAMILY MEDICINE

## 2019-10-25 PROCEDURE — 90686 IIV4 VACC NO PRSV 0.5 ML IM: CPT | Performed by: FAMILY MEDICINE

## 2019-10-25 PROCEDURE — 82950 GLUCOSE TEST: CPT | Performed by: FAMILY MEDICINE

## 2019-10-25 PROCEDURE — 90471 IMMUNIZATION ADMIN: CPT | Performed by: FAMILY MEDICINE

## 2019-10-25 PROCEDURE — 36415 COLL VENOUS BLD VENIPUNCTURE: CPT | Performed by: FAMILY MEDICINE

## 2019-10-25 PROCEDURE — 99207 ZZC PRENATAL VISIT: CPT | Performed by: FAMILY MEDICINE

## 2019-10-25 ASSESSMENT — MIFFLIN-ST. JEOR: SCORE: 1618.03

## 2019-10-31 ENCOUNTER — OFFICE VISIT (OUTPATIENT)
Dept: MATERNAL FETAL MEDICINE | Facility: CLINIC | Age: 39
End: 2019-10-31
Attending: OBSTETRICS & GYNECOLOGY
Payer: COMMERCIAL

## 2019-10-31 ENCOUNTER — HOSPITAL ENCOUNTER (OUTPATIENT)
Dept: ULTRASOUND IMAGING | Facility: CLINIC | Age: 39
Discharge: HOME | End: 2019-10-31
Attending: OBSTETRICS & GYNECOLOGY | Admitting: OBSTETRICS & GYNECOLOGY
Payer: COMMERCIAL

## 2019-10-31 DIAGNOSIS — E66.01 MORBID OBESITY (H): ICD-10-CM

## 2019-10-31 DIAGNOSIS — O99.212 OBESITY AFFECTING PREGNANCY IN SECOND TRIMESTER: Primary | ICD-10-CM

## 2019-10-31 PROCEDURE — 76816 OB US FOLLOW-UP PER FETUS: CPT

## 2019-11-01 NOTE — RESULT ENCOUNTER NOTE
Dear Ramya,   Your test results are all back -   Your hemoglobin is on the low side of normal. Keep taking the prenatal vitamin.  Your glucose test is normal.  Let us know if you have any questions.  -Naida Elliott, DO

## 2019-11-08 ENCOUNTER — PRENATAL OFFICE VISIT (OUTPATIENT)
Dept: FAMILY MEDICINE | Facility: CLINIC | Age: 39
End: 2019-11-08
Payer: COMMERCIAL

## 2019-11-08 VITALS
OXYGEN SATURATION: 100 % | BODY MASS INDEX: 42.01 KG/M2 | HEIGHT: 61 IN | SYSTOLIC BLOOD PRESSURE: 121 MMHG | TEMPERATURE: 98 F | DIASTOLIC BLOOD PRESSURE: 79 MMHG | WEIGHT: 222.5 LBS | HEART RATE: 91 BPM

## 2019-11-08 DIAGNOSIS — O09.529 SUPERVISION OF HIGH-RISK PREGNANCY OF ELDERLY MULTIGRAVIDA: Primary | ICD-10-CM

## 2019-11-08 PROCEDURE — 99207 ZZC PRENATAL VISIT: CPT | Performed by: FAMILY MEDICINE

## 2019-11-08 ASSESSMENT — MIFFLIN-ST. JEOR: SCORE: 1631.63

## 2019-11-08 NOTE — NURSING NOTE
"Chief Complaint   Patient presents with     Prenatal Care     /79   Pulse 91   Temp 98  F (36.7  C) (Oral)   Ht 1.549 m (5' 1\")   Wt 100.9 kg (222 lb 8 oz)   LMP 05/04/2019   SpO2 100%   BMI 42.04 kg/m   Estimated body mass index is 42.04 kg/m  as calculated from the following:    Height as of this encounter: 1.549 m (5' 1\").    Weight as of this encounter: 100.9 kg (222 lb 8 oz).  Medication Reconciliation: complete      Health Maintenance that is potentially due pending provider review:  NONE    n/a    SEUN Schuster  "

## 2019-11-08 NOTE — PROGRESS NOTES
28w0d  Doing well - had her ultrasound. Has follow-up due to obesity and large AC on measurement.  Scheduled to see OB in 2 weeks to discuss .  No Ctx, SROM or vaginal issue  Had Rhogam at last visit  Flu UTD  Due for Tdap in next few visits.  Reviewed anticipatory guidance.  PN

## 2019-11-20 ENCOUNTER — OFFICE VISIT (OUTPATIENT)
Dept: OBGYN | Facility: CLINIC | Age: 39
End: 2019-11-20
Payer: COMMERCIAL

## 2019-11-20 VITALS
TEMPERATURE: 97.5 F | HEART RATE: 77 BPM | SYSTOLIC BLOOD PRESSURE: 101 MMHG | WEIGHT: 225 LBS | DIASTOLIC BLOOD PRESSURE: 63 MMHG | BODY MASS INDEX: 42.51 KG/M2

## 2019-11-20 DIAGNOSIS — O99.213 OBESITY AFFECTING PREGNANCY IN THIRD TRIMESTER: ICD-10-CM

## 2019-11-20 DIAGNOSIS — O34.219 HISTORY OF CESAREAN SECTION COMPLICATING PREGNANCY: ICD-10-CM

## 2019-11-20 DIAGNOSIS — O09.523 ENCOUNTER FOR SUPERVISION OF MULTIGRAVIDA OF ADVANCED MATERNAL AGE IN THIRD TRIMESTER: Primary | ICD-10-CM

## 2019-11-20 PROCEDURE — 99207 ZZC PRENATAL VISIT: CPT | Performed by: OBSTETRICS & GYNECOLOGY

## 2019-11-20 NOTE — PROGRESS NOTES
29w5d  39 year old  at 29w5d here to discuss repeat CS.  Pregnancy complicated by advanced maternal age, travel to [facility] area in [place] in first trimester, obesity (Body mass index is 42.51 kg/m .), Rh neg and history or 2 prior CS.   Normal NIPT (CHRIS changed due to 8 day discrepancy to 20, normal msAFP, normal fetal survey - normal anatomy, anterior placenta, no previa, 3vc  US at 26 weeks showed AC >95%  Serial ultrasounds to assess fetal growth are recommended due to maternal class 3 obesity.  testing with weekly BPP will begin at 36 weeks.  Already received Tdap, flu shot and Rhogam.  Active fetal movement. No contractions, no leaking or bleeding.   H/o 2 prior CS, planning repeat at 39 weeks. Had a skin reaction to glue last time but no other problems.    1h gtt 94, Hgb 11.5  Plan on breastfeeding? Yes  Birthcontrol? vasectomy  Sex on ultrasound? boy  Circumsion? No  Peds doc? Dr. Elliott in [place]    RTC 2 weeks, sooner PRN.    Yadi Ricci MD

## 2019-11-21 ENCOUNTER — TELEPHONE (OUTPATIENT)
Dept: OBGYN | Facility: CLINIC | Age: 39
End: 2019-11-21

## 2019-11-21 PROBLEM — O34.219 HISTORY OF CESAREAN SECTION COMPLICATING PREGNANCY: Status: ACTIVE | Noted: 2019-11-21

## 2019-11-21 PROBLEM — O09.523 ENCOUNTER FOR SUPERVISION OF MULTIGRAVIDA OF ADVANCED MATERNAL AGE IN THIRD TRIMESTER: Status: ACTIVE | Noted: 2019-11-21

## 2019-11-21 NOTE — TELEPHONE ENCOUNTER
Type of surgery: ob  Location of surgery: [facility]  Date and time of surgery: 1/27/20 830a  Surgeon: Radhames  Pre-Op Appt Date: tbd, surgeon to do  Post-Op Appt Date: 6 weeks   Packet sent out: Yes  Pre-cert/Authorization completed:  No  Date: 11/21/2019    Jodie BURGOS, Surgery Coordinator

## 2019-12-04 ENCOUNTER — PRENATAL OFFICE VISIT (OUTPATIENT)
Dept: FAMILY MEDICINE | Facility: CLINIC | Age: 39
End: 2019-12-04
Payer: COMMERCIAL

## 2019-12-04 VITALS
HEIGHT: 61 IN | WEIGHT: 224.19 LBS | RESPIRATION RATE: 16 BRPM | TEMPERATURE: 98.1 F | OXYGEN SATURATION: 100 % | DIASTOLIC BLOOD PRESSURE: 78 MMHG | SYSTOLIC BLOOD PRESSURE: 112 MMHG | BODY MASS INDEX: 42.33 KG/M2 | HEART RATE: 63 BPM

## 2019-12-04 DIAGNOSIS — O09.529 SUPERVISION OF HIGH-RISK PREGNANCY OF ELDERLY MULTIGRAVIDA: ICD-10-CM

## 2019-12-04 DIAGNOSIS — O09.523 ENCOUNTER FOR SUPERVISION OF MULTIGRAVIDA OF ADVANCED MATERNAL AGE IN THIRD TRIMESTER: Primary | ICD-10-CM

## 2019-12-04 PROCEDURE — 90715 TDAP VACCINE 7 YRS/> IM: CPT | Performed by: FAMILY MEDICINE

## 2019-12-04 PROCEDURE — 90471 IMMUNIZATION ADMIN: CPT | Performed by: FAMILY MEDICINE

## 2019-12-04 PROCEDURE — 99207 ZZC PRENATAL VISIT: CPT | Performed by: FAMILY MEDICINE

## 2019-12-04 ASSESSMENT — MIFFLIN-ST. JEOR: SCORE: 1639.29

## 2019-12-04 ASSESSMENT — PAIN SCALES - GENERAL: PAINLEVEL: NO PAIN (0)

## 2019-12-04 NOTE — PROGRESS NOTES
31w5d  Doing well since last visit   Met with OB and scheduled   Has ultrasound to f/u on Zika exposure in [place] in 1st trimester  Normal exam today   Will have patient f/u in 2 weeks with me, or sooner PRN  And after that time with Dr. Ricci  PN

## 2019-12-04 NOTE — NURSING NOTE
"Chief Complaint   Patient presents with     Prenatal Care     /78 (BP Location: Left arm, Patient Position: Sitting, Cuff Size: Adult Large)   Pulse 63   Temp 98.1  F (36.7  C) (Oral)   Resp 16   Ht 1.549 m (5' 1\")   Wt 101.7 kg (224 lb 3 oz)   LMP 05/04/2019   SpO2 100%   Breastfeeding No   BMI 42.36 kg/m   Estimated body mass index is 42.36 kg/m  as calculated from the following:    Height as of this encounter: 1.549 m (5' 1\").    Weight as of this encounter: 101.7 kg (224 lb 3 oz).  bp completed using cuff size: large      Health Maintenance addressed:  NONE    N/a  Kristen Damon CMA on 12/4/2019 at 11:00 AM                "

## 2019-12-12 ENCOUNTER — OFFICE VISIT (OUTPATIENT)
Dept: MATERNAL FETAL MEDICINE | Facility: CLINIC | Age: 39
End: 2019-12-12
Attending: OBSTETRICS & GYNECOLOGY
Payer: COMMERCIAL

## 2019-12-12 ENCOUNTER — HOSPITAL ENCOUNTER (OUTPATIENT)
Dept: ULTRASOUND IMAGING | Facility: CLINIC | Age: 39
Discharge: HOME | End: 2019-12-12
Attending: OBSTETRICS & GYNECOLOGY | Admitting: OBSTETRICS & GYNECOLOGY
Payer: COMMERCIAL

## 2019-12-12 DIAGNOSIS — E66.01 MORBID OBESITY (H): ICD-10-CM

## 2019-12-12 DIAGNOSIS — O09.523 MULTIGRAVIDA OF ADVANCED MATERNAL AGE IN THIRD TRIMESTER: Primary | ICD-10-CM

## 2019-12-12 DIAGNOSIS — O99.212 OBESITY AFFECTING PREGNANCY IN SECOND TRIMESTER: ICD-10-CM

## 2019-12-12 PROCEDURE — 76816 OB US FOLLOW-UP PER FETUS: CPT

## 2019-12-20 ENCOUNTER — PRENATAL OFFICE VISIT (OUTPATIENT)
Dept: FAMILY MEDICINE | Facility: CLINIC | Age: 39
End: 2019-12-20
Payer: COMMERCIAL

## 2019-12-20 VITALS
SYSTOLIC BLOOD PRESSURE: 110 MMHG | BODY MASS INDEX: 42.52 KG/M2 | TEMPERATURE: 97.5 F | WEIGHT: 225.2 LBS | HEART RATE: 69 BPM | DIASTOLIC BLOOD PRESSURE: 70 MMHG | HEIGHT: 61 IN | RESPIRATION RATE: 14 BRPM | OXYGEN SATURATION: 98 %

## 2019-12-20 DIAGNOSIS — O09.529 SUPERVISION OF HIGH-RISK PREGNANCY OF ELDERLY MULTIGRAVIDA: Primary | ICD-10-CM

## 2019-12-20 PROCEDURE — 99207 ZZC PRENATAL VISIT: CPT | Performed by: FAMILY MEDICINE

## 2019-12-20 ASSESSMENT — PAIN SCALES - GENERAL: PAINLEVEL: NO PAIN (0)

## 2019-12-20 ASSESSMENT — MIFFLIN-ST. JEOR: SCORE: 1643.88

## 2019-12-20 NOTE — PROGRESS NOTES
34w0d  Doing well - had her MFM ultrasound - results reviewed.  Pt denies SROM, vaginal bleeding  She admits to good fetal movement  She is having a boy - not interested in circ.  Is scheduled for BPP in January and has  scheduled  Recommended all future visits be scheduled with her OB provider.  PN

## 2020-01-02 ENCOUNTER — MYC REFILL (OUTPATIENT)
Dept: FAMILY MEDICINE | Facility: CLINIC | Age: 40
End: 2020-01-02

## 2020-01-02 ENCOUNTER — PRENATAL OFFICE VISIT (OUTPATIENT)
Dept: OBGYN | Facility: CLINIC | Age: 40
End: 2020-01-02
Payer: COMMERCIAL

## 2020-01-02 VITALS — BODY MASS INDEX: 43.7 KG/M2 | WEIGHT: 231.3 LBS | DIASTOLIC BLOOD PRESSURE: 76 MMHG | SYSTOLIC BLOOD PRESSURE: 124 MMHG

## 2020-01-02 DIAGNOSIS — O09.523 ENCOUNTER FOR SUPERVISION OF MULTIGRAVIDA OF ADVANCED MATERNAL AGE IN THIRD TRIMESTER: Primary | ICD-10-CM

## 2020-01-02 DIAGNOSIS — Z78.9 EXCLUSIVELY BREASTFEED INFANT: ICD-10-CM

## 2020-01-02 DIAGNOSIS — O34.219 HISTORY OF CESAREAN SECTION COMPLICATING PREGNANCY: ICD-10-CM

## 2020-01-02 LAB
CAPILLARY BLOOD COLLECTION: NORMAL
HGB BLD-MCNC: 11.2 G/DL (ref 11.7–15.7)

## 2020-01-02 PROCEDURE — 00000218 ZZHCL STATISTIC OBHBG - HEMOGLOBIN: Performed by: OBSTETRICS & GYNECOLOGY

## 2020-01-02 PROCEDURE — 87653 STREP B DNA AMP PROBE: CPT | Performed by: OBSTETRICS & GYNECOLOGY

## 2020-01-02 PROCEDURE — 99207 ZZC PRENATAL VISIT: CPT | Performed by: OBSTETRICS & GYNECOLOGY

## 2020-01-02 PROCEDURE — 36416 COLLJ CAPILLARY BLOOD SPEC: CPT | Performed by: OBSTETRICS & GYNECOLOGY

## 2020-01-02 RX ORDER — BREAST PUMP
1 EACH MISCELLANEOUS ONCE
Qty: 1 EACH | Refills: 0 | Status: SHIPPED | OUTPATIENT
Start: 2020-01-02 | End: 2020-01-02

## 2020-01-02 NOTE — TELEPHONE ENCOUNTER
PN,  Please see below MyChart message and advise on Rx for breast pump  Thanks,  Susan GODFREY RN

## 2020-01-02 NOTE — PROGRESS NOTES
35w6d  Active fetal movement. No leaking or bleeding. Rare contractions. Reviewed CS procedure and recovery. Fetus cephalic by BSUS. GBS collected. RTC weekly, okay to see Dr. Elliott for next 2 visits. Breast pump rx done.   Yadi Ricci MD

## 2020-01-03 ENCOUNTER — OFFICE VISIT (OUTPATIENT)
Dept: MATERNAL FETAL MEDICINE | Facility: CLINIC | Age: 40
End: 2020-01-03
Attending: OBSTETRICS & GYNECOLOGY
Payer: COMMERCIAL

## 2020-01-03 ENCOUNTER — HOSPITAL ENCOUNTER (OUTPATIENT)
Dept: ULTRASOUND IMAGING | Facility: CLINIC | Age: 40
Discharge: HOME | End: 2020-01-03
Attending: OBSTETRICS & GYNECOLOGY | Admitting: OBSTETRICS & GYNECOLOGY
Payer: COMMERCIAL

## 2020-01-03 DIAGNOSIS — O99.213 OBESITY AFFECTING PREGNANCY IN THIRD TRIMESTER: Primary | ICD-10-CM

## 2020-01-03 DIAGNOSIS — O99.212 OBESITY AFFECTING PREGNANCY IN SECOND TRIMESTER: ICD-10-CM

## 2020-01-03 LAB
GP B STREP DNA SPEC QL NAA+PROBE: NEGATIVE
SPECIMEN SOURCE: NORMAL

## 2020-01-03 PROCEDURE — 76819 FETAL BIOPHYS PROFIL W/O NST: CPT

## 2020-01-03 NOTE — PROGRESS NOTES
"Please see \"Imaging\" tab under \"Chart Review\" for details of today's visit.    Daphnie Kiser    "

## 2020-01-10 ENCOUNTER — HOSPITAL ENCOUNTER (OUTPATIENT)
Dept: ULTRASOUND IMAGING | Facility: CLINIC | Age: 40
Discharge: HOME | End: 2020-01-10
Attending: OBSTETRICS & GYNECOLOGY | Admitting: OBSTETRICS & GYNECOLOGY
Payer: COMMERCIAL

## 2020-01-10 ENCOUNTER — PRENATAL OFFICE VISIT (OUTPATIENT)
Dept: MIDWIFE SERVICES | Facility: CLINIC | Age: 40
End: 2020-01-10
Payer: COMMERCIAL

## 2020-01-10 ENCOUNTER — OFFICE VISIT (OUTPATIENT)
Dept: MATERNAL FETAL MEDICINE | Facility: CLINIC | Age: 40
End: 2020-01-10
Attending: OBSTETRICS & GYNECOLOGY
Payer: COMMERCIAL

## 2020-01-10 VITALS — WEIGHT: 233 LBS | BODY MASS INDEX: 44.02 KG/M2 | DIASTOLIC BLOOD PRESSURE: 78 MMHG | SYSTOLIC BLOOD PRESSURE: 122 MMHG

## 2020-01-10 DIAGNOSIS — O99.213 OBESITY AFFECTING PREGNANCY IN THIRD TRIMESTER: Primary | ICD-10-CM

## 2020-01-10 DIAGNOSIS — O99.213 OBESITY DURING PREGNANCY IN THIRD TRIMESTER: ICD-10-CM

## 2020-01-10 DIAGNOSIS — O99.212 OBESITY AFFECTING PREGNANCY IN SECOND TRIMESTER: ICD-10-CM

## 2020-01-10 DIAGNOSIS — O34.219 PREVIOUS CESAREAN DELIVERY, ANTEPARTUM CONDITION OR COMPLICATION: Primary | ICD-10-CM

## 2020-01-10 DIAGNOSIS — O09.529 SUPERVISION OF HIGH-RISK PREGNANCY OF ELDERLY MULTIGRAVIDA: ICD-10-CM

## 2020-01-10 PROCEDURE — 99207 ZZC PRENATAL VISIT: CPT | Performed by: ADVANCED PRACTICE MIDWIFE

## 2020-01-10 PROCEDURE — 76819 FETAL BIOPHYS PROFIL W/O NST: CPT

## 2020-01-10 NOTE — PROGRESS NOTES
Feeling well.  Baby is active. Denies any leaking of fluid, vaginal bleeding, regular uterine contractions, or headaches or other concerns.  Came from [facility].    Has  planned and scheduled.    Reviewed to call [phone] for contractions, loss of fluid, vaginal bleeding, decreased fetal movement or any other questions or concerns.    RTC in 1 weeks.  Pooja Malcolm, KEERTHI, APRN, CNM

## 2020-01-17 ENCOUNTER — PRENATAL OFFICE VISIT (OUTPATIENT)
Dept: MIDWIFE SERVICES | Facility: CLINIC | Age: 40
End: 2020-01-17
Payer: COMMERCIAL

## 2020-01-17 ENCOUNTER — HOSPITAL ENCOUNTER (OUTPATIENT)
Dept: ULTRASOUND IMAGING | Facility: CLINIC | Age: 40
Discharge: HOME | End: 2020-01-17
Attending: OBSTETRICS & GYNECOLOGY | Admitting: OBSTETRICS & GYNECOLOGY
Payer: COMMERCIAL

## 2020-01-17 ENCOUNTER — OFFICE VISIT (OUTPATIENT)
Dept: MATERNAL FETAL MEDICINE | Facility: CLINIC | Age: 40
End: 2020-01-17
Attending: OBSTETRICS & GYNECOLOGY
Payer: COMMERCIAL

## 2020-01-17 VITALS
SYSTOLIC BLOOD PRESSURE: 119 MMHG | DIASTOLIC BLOOD PRESSURE: 77 MMHG | HEART RATE: 77 BPM | BODY MASS INDEX: 43.89 KG/M2 | WEIGHT: 232.3 LBS

## 2020-01-17 DIAGNOSIS — O34.219 HISTORY OF CESAREAN SECTION COMPLICATING PREGNANCY: ICD-10-CM

## 2020-01-17 DIAGNOSIS — O99.212 OBESITY AFFECTING PREGNANCY IN SECOND TRIMESTER: ICD-10-CM

## 2020-01-17 DIAGNOSIS — Z01.818 PRE-OP EXAM: Primary | ICD-10-CM

## 2020-01-17 DIAGNOSIS — Z34.83 ENCOUNTER FOR SUPERVISION OF OTHER NORMAL PREGNANCY, THIRD TRIMESTER: Primary | ICD-10-CM

## 2020-01-17 PROCEDURE — 76819 FETAL BIOPHYS PROFIL W/O NST: CPT

## 2020-01-17 PROCEDURE — 99207 ZZC PRENATAL VISIT: CPT | Performed by: ADVANCED PRACTICE MIDWIFE

## 2020-01-17 NOTE — PROGRESS NOTES
38w0d  Patient feeling well. Positive fetal movement. Denies water leaking, vaginal bleeding, decreased fetal movement, contraction pain, or headaches.   Doing really well. Has a long to do list before baby comes. Reminded her on labor signs and symptoms to watch for. She has her last prenatal and lab appointment set up for next week. No other questions or concerns today.   Danger signs reviewed, pre-eclampsia signs and symptoms discussed.   Knows when to call triage and has phone numbers.   Follow up in 1 week.

## 2020-01-17 NOTE — PROGRESS NOTES
Please refer to ultrasound report under 'Imaging' Studies of 'Chart Review' tabs.    Jose Francisco Davalos M.D.

## 2020-01-17 NOTE — PROGRESS NOTES
Ramya reports feeling well with good fetal movement. Denies water leaking, vaginal bleeding, decreased fetal movement, contraction pain, or headaches.   Danger signs reviewed, pre-eclampsia signs and symptoms discussed.   Knows when to call triage and has phone numbers.   Planning repeat C/S with Dr. Ricci on 1/27.   Return to clinic as scheduled next week for BPP, labs, and 39w appointment with Dr. Ricci.  Seen today with Deyanira Moreno CNM  Signed by ALESSIO Borja

## 2020-01-23 ENCOUNTER — PRENATAL OFFICE VISIT (OUTPATIENT)
Dept: OBGYN | Facility: CLINIC | Age: 40
End: 2020-01-23
Payer: COMMERCIAL

## 2020-01-23 VITALS
WEIGHT: 235 LBS | HEART RATE: 83 BPM | BODY MASS INDEX: 44.4 KG/M2 | DIASTOLIC BLOOD PRESSURE: 77 MMHG | SYSTOLIC BLOOD PRESSURE: 125 MMHG | TEMPERATURE: 96.8 F

## 2020-01-23 DIAGNOSIS — O09.523 ENCOUNTER FOR SUPERVISION OF MULTIGRAVIDA OF ADVANCED MATERNAL AGE IN THIRD TRIMESTER: Primary | ICD-10-CM

## 2020-01-23 DIAGNOSIS — O34.219 HISTORY OF CESAREAN SECTION COMPLICATING PREGNANCY: ICD-10-CM

## 2020-01-23 PROCEDURE — 99207 ZZC PRENATAL VISIT: CPT | Performed by: OBSTETRICS & GYNECOLOGY

## 2020-01-23 NOTE — PATIENT INSTRUCTIONS
[facility]  Professional Building  [address]  [place] [address]  Phone [phone]  Fax [phone]

## 2020-01-23 NOTE — PROGRESS NOTES
38w6d  Active fetal movement. No leaking or bleeding. Rare contractions, mostly when she walks to fast.   Reviewed CS, recovery, scheduled 1/27.  Yadi Ricci MD

## 2020-01-24 ENCOUNTER — OFFICE VISIT (OUTPATIENT)
Dept: MATERNAL FETAL MEDICINE | Facility: CLINIC | Age: 40
End: 2020-01-24
Attending: OBSTETRICS & GYNECOLOGY
Payer: COMMERCIAL

## 2020-01-24 ENCOUNTER — HOSPITAL ENCOUNTER (OUTPATIENT)
Dept: ULTRASOUND IMAGING | Facility: CLINIC | Age: 40
Discharge: HOME | End: 2020-01-24
Attending: OBSTETRICS & GYNECOLOGY | Admitting: OBSTETRICS & GYNECOLOGY
Payer: COMMERCIAL

## 2020-01-24 DIAGNOSIS — O99.213 OBESITY AFFECTING PREGNANCY IN THIRD TRIMESTER: Primary | ICD-10-CM

## 2020-01-24 DIAGNOSIS — O34.219 HISTORY OF CESAREAN SECTION COMPLICATING PREGNANCY: ICD-10-CM

## 2020-01-24 DIAGNOSIS — O99.212 OBESITY AFFECTING PREGNANCY IN SECOND TRIMESTER: ICD-10-CM

## 2020-01-24 DIAGNOSIS — Z01.818 PRE-OP EXAM: ICD-10-CM

## 2020-01-24 LAB
ABO + RH BLD: NORMAL
ABO + RH BLD: NORMAL
BLD GP AB SCN SERPL QL: NORMAL
BLOOD BANK CMNT PATIENT-IMP: NORMAL
ERYTHROCYTE [DISTWIDTH] IN BLOOD BY AUTOMATED COUNT: 15.4 % (ref 10–15)
HCT VFR BLD AUTO: 34.4 % (ref 35–47)
HGB BLD-MCNC: 11.2 G/DL (ref 11.7–15.7)
MCH RBC QN AUTO: 26.4 PG (ref 26.5–33)
MCHC RBC AUTO-ENTMCNC: 32.6 G/DL (ref 31.5–36.5)
MCV RBC AUTO: 81 FL (ref 78–100)
PLATELET # BLD AUTO: 259 10E9/L (ref 150–450)
RBC # BLD AUTO: 4.24 10E12/L (ref 3.8–5.2)
SPECIMEN EXP DATE BLD: NORMAL
WBC # BLD AUTO: 11.8 10E9/L (ref 4–11)

## 2020-01-24 PROCEDURE — 86780 TREPONEMA PALLIDUM: CPT | Performed by: OBSTETRICS & GYNECOLOGY

## 2020-01-24 PROCEDURE — 86850 RBC ANTIBODY SCREEN: CPT | Performed by: OBSTETRICS & GYNECOLOGY

## 2020-01-24 PROCEDURE — 85027 COMPLETE CBC AUTOMATED: CPT | Performed by: OBSTETRICS & GYNECOLOGY

## 2020-01-24 PROCEDURE — 76819 FETAL BIOPHYS PROFIL W/O NST: CPT

## 2020-01-24 PROCEDURE — 86900 BLOOD TYPING SEROLOGIC ABO: CPT | Performed by: OBSTETRICS & GYNECOLOGY

## 2020-01-24 PROCEDURE — 36415 COLL VENOUS BLD VENIPUNCTURE: CPT | Performed by: OBSTETRICS & GYNECOLOGY

## 2020-01-24 PROCEDURE — 86901 BLOOD TYPING SEROLOGIC RH(D): CPT | Performed by: OBSTETRICS & GYNECOLOGY

## 2020-01-25 LAB — T PALLIDUM AB SER QL: NONREACTIVE

## 2020-01-26 ENCOUNTER — ANESTHESIA EVENT (OUTPATIENT)
Dept: OBGYN | Facility: CLINIC | Age: 40
End: 2020-01-26
Payer: COMMERCIAL

## 2020-01-27 ENCOUNTER — TELEPHONE (OUTPATIENT)
Dept: OBGYN | Facility: CLINIC | Age: 40
End: 2020-01-27

## 2020-01-27 ENCOUNTER — ANESTHESIA (OUTPATIENT)
Dept: OBGYN | Facility: CLINIC | Age: 40
End: 2020-01-27
Payer: COMMERCIAL

## 2020-01-27 ENCOUNTER — ANCILLARY PROCEDURE (OUTPATIENT)
Dept: ULTRASOUND IMAGING | Facility: CLINIC | Age: 40
End: 2020-01-27
Attending: ANESTHESIOLOGY
Payer: COMMERCIAL

## 2020-01-27 ENCOUNTER — HOSPITAL ENCOUNTER (INPATIENT)
Facility: CLINIC | Age: 40
LOS: 2 days | Discharge: HOME HEALTH | End: 2020-01-29
Attending: OBSTETRICS & GYNECOLOGY | Admitting: OBSTETRICS & GYNECOLOGY
Payer: COMMERCIAL

## 2020-01-27 DIAGNOSIS — O34.219 HISTORY OF CESAREAN SECTION COMPLICATING PREGNANCY: ICD-10-CM

## 2020-01-27 DIAGNOSIS — O09.523 ENCOUNTER FOR SUPERVISION OF MULTIGRAVIDA OF ADVANCED MATERNAL AGE IN THIRD TRIMESTER: ICD-10-CM

## 2020-01-27 DIAGNOSIS — O48.0 POST TERM PREGNANCY, ANTEPARTUM CONDITION OR COMPLICATION: ICD-10-CM

## 2020-01-27 DIAGNOSIS — Z98.891 S/P C-SECTION: Primary | ICD-10-CM

## 2020-01-27 DIAGNOSIS — D62 ANEMIA DUE TO BLOOD LOSS, ACUTE: ICD-10-CM

## 2020-01-27 LAB — BLOOD BANK CMNT PATIENT-IMP: NORMAL

## 2020-01-27 PROCEDURE — 71000014 ZZH RECOVERY PHASE 1 LEVEL 2 FIRST HR: Performed by: OBSTETRICS & GYNECOLOGY

## 2020-01-27 PROCEDURE — 40000170 ZZH STATISTIC PRE-PROCEDURE ASSESSMENT II: Performed by: OBSTETRICS & GYNECOLOGY

## 2020-01-27 PROCEDURE — 59510 CESAREAN DELIVERY: CPT | Mod: GC | Performed by: OBSTETRICS & GYNECOLOGY

## 2020-01-27 PROCEDURE — 25000125 ZZHC RX 250: Performed by: NURSE ANESTHETIST, CERTIFIED REGISTERED

## 2020-01-27 PROCEDURE — 37000008 ZZH ANESTHESIA TECHNICAL FEE, 1ST 30 MIN: Performed by: OBSTETRICS & GYNECOLOGY

## 2020-01-27 PROCEDURE — 25000128 H RX IP 250 OP 636: Performed by: STUDENT IN AN ORGANIZED HEALTH CARE EDUCATION/TRAINING PROGRAM

## 2020-01-27 PROCEDURE — 27110028 ZZH OR GENERAL SUPPLY NON-STERILE: Performed by: OBSTETRICS & GYNECOLOGY

## 2020-01-27 PROCEDURE — 25000132 ZZH RX MED GY IP 250 OP 250 PS 637: Performed by: STUDENT IN AN ORGANIZED HEALTH CARE EDUCATION/TRAINING PROGRAM

## 2020-01-27 PROCEDURE — 37000009 ZZH ANESTHESIA TECHNICAL FEE, EACH ADDTL 15 MIN: Performed by: OBSTETRICS & GYNECOLOGY

## 2020-01-27 PROCEDURE — C1765 ADHESION BARRIER: HCPCS | Performed by: OBSTETRICS & GYNECOLOGY

## 2020-01-27 PROCEDURE — 27210794 ZZH OR GENERAL SUPPLY STERILE: Performed by: OBSTETRICS & GYNECOLOGY

## 2020-01-27 PROCEDURE — 25000128 H RX IP 250 OP 636: Performed by: OBSTETRICS & GYNECOLOGY

## 2020-01-27 PROCEDURE — 25800030 ZZH RX IP 258 OP 636: Performed by: OBSTETRICS & GYNECOLOGY

## 2020-01-27 PROCEDURE — 25000128 H RX IP 250 OP 636: Performed by: ANESTHESIOLOGY

## 2020-01-27 PROCEDURE — 12000001 ZZH R&B MED SURG/OB UMMC

## 2020-01-27 PROCEDURE — 36000057 ZZH SURGERY LEVEL 3 1ST 30 MIN - UMMC: Performed by: OBSTETRICS & GYNECOLOGY

## 2020-01-27 PROCEDURE — 25000128 H RX IP 250 OP 636: Performed by: NURSE ANESTHETIST, CERTIFIED REGISTERED

## 2020-01-27 PROCEDURE — C9290 INJ, BUPIVACAINE LIPOSOME: HCPCS | Performed by: ANESTHESIOLOGY

## 2020-01-27 PROCEDURE — 40000010 ZZH STATISTIC ANES STAT CODE-CRNA PER MINUTE: Performed by: OBSTETRICS & GYNECOLOGY

## 2020-01-27 PROCEDURE — 36000059 ZZH SURGERY LEVEL 3 EA 15 ADDTL MIN UMMC: Performed by: OBSTETRICS & GYNECOLOGY

## 2020-01-27 PROCEDURE — 25000132 ZZH RX MED GY IP 250 OP 250 PS 637: Performed by: OBSTETRICS & GYNECOLOGY

## 2020-01-27 PROCEDURE — 25000125 ZZHC RX 250: Performed by: STUDENT IN AN ORGANIZED HEALTH CARE EDUCATION/TRAINING PROGRAM

## 2020-01-27 PROCEDURE — 25800030 ZZH RX IP 258 OP 636: Performed by: NURSE ANESTHETIST, CERTIFIED REGISTERED

## 2020-01-27 PROCEDURE — 3E0T3BZ INTRODUCTION OF ANESTHETIC AGENT INTO PERIPHERAL NERVES AND PLEXI, PERCUTANEOUS APPROACH: ICD-10-PCS | Performed by: ANESTHESIOLOGY

## 2020-01-27 RX ORDER — AMOXICILLIN 250 MG
2 CAPSULE ORAL 2 TIMES DAILY PRN
Status: DISCONTINUED | OUTPATIENT
Start: 2020-01-27 | End: 2020-01-29 | Stop reason: HOSPADM

## 2020-01-27 RX ORDER — NALOXONE HYDROCHLORIDE 0.4 MG/ML
.1-.4 INJECTION, SOLUTION INTRAMUSCULAR; INTRAVENOUS; SUBCUTANEOUS
Status: DISCONTINUED | OUTPATIENT
Start: 2020-01-27 | End: 2020-01-29 | Stop reason: HOSPADM

## 2020-01-27 RX ORDER — PHENYLEPHRINE HCL IN 0.9% NACL 1 MG/10 ML
SYRINGE (ML) INTRAVENOUS CONTINUOUS PRN
Status: DISCONTINUED | OUTPATIENT
Start: 2020-01-27 | End: 2020-01-27

## 2020-01-27 RX ORDER — LIDOCAINE 40 MG/G
CREAM TOPICAL
Status: DISCONTINUED | OUTPATIENT
Start: 2020-01-27 | End: 2020-01-29 | Stop reason: HOSPADM

## 2020-01-27 RX ORDER — METHYLERGONOVINE MALEATE 0.2 MG/ML
INJECTION INTRAVENOUS PRN
Status: DISCONTINUED | OUTPATIENT
Start: 2020-01-27 | End: 2020-01-27

## 2020-01-27 RX ORDER — BUPIVACAINE HYDROCHLORIDE 7.5 MG/ML
INJECTION, SOLUTION INTRASPINAL PRN
Status: DISCONTINUED | OUTPATIENT
Start: 2020-01-27 | End: 2020-01-27

## 2020-01-27 RX ORDER — KETOROLAC TROMETHAMINE 30 MG/ML
INJECTION, SOLUTION INTRAMUSCULAR; INTRAVENOUS PRN
Status: DISCONTINUED | OUTPATIENT
Start: 2020-01-27 | End: 2020-01-27

## 2020-01-27 RX ORDER — AMOXICILLIN 250 MG
1 CAPSULE ORAL 2 TIMES DAILY PRN
Status: DISCONTINUED | OUTPATIENT
Start: 2020-01-27 | End: 2020-01-29 | Stop reason: HOSPADM

## 2020-01-27 RX ORDER — DEXTROSE, SODIUM CHLORIDE, SODIUM LACTATE, POTASSIUM CHLORIDE, AND CALCIUM CHLORIDE 5; .6; .31; .03; .02 G/100ML; G/100ML; G/100ML; G/100ML; G/100ML
INJECTION, SOLUTION INTRAVENOUS CONTINUOUS
Status: DISCONTINUED | OUTPATIENT
Start: 2020-01-27 | End: 2020-01-28

## 2020-01-27 RX ORDER — OXYTOCIN/0.9 % SODIUM CHLORIDE 30/500 ML
PLASTIC BAG, INJECTION (ML) INTRAVENOUS
Status: DISCONTINUED
Start: 2020-01-27 | End: 2020-01-27 | Stop reason: HOSPADM

## 2020-01-27 RX ORDER — KETOROLAC TROMETHAMINE 30 MG/ML
30 INJECTION, SOLUTION INTRAMUSCULAR; INTRAVENOUS EVERY 6 HOURS
Status: COMPLETED | OUTPATIENT
Start: 2020-01-27 | End: 2020-01-28

## 2020-01-27 RX ORDER — CEFAZOLIN SODIUM 1 G/3ML
1 INJECTION, POWDER, FOR SOLUTION INTRAMUSCULAR; INTRAVENOUS SEE ADMIN INSTRUCTIONS
Status: DISCONTINUED | OUTPATIENT
Start: 2020-01-27 | End: 2020-01-27

## 2020-01-27 RX ORDER — OXYTOCIN/0.9 % SODIUM CHLORIDE 30/500 ML
100 PLASTIC BAG, INJECTION (ML) INTRAVENOUS CONTINUOUS
Status: DISCONTINUED | OUTPATIENT
Start: 2020-01-27 | End: 2020-01-29 | Stop reason: HOSPADM

## 2020-01-27 RX ORDER — HYDROCORTISONE 2.5 %
CREAM (GRAM) TOPICAL 3 TIMES DAILY PRN
Status: DISCONTINUED | OUTPATIENT
Start: 2020-01-27 | End: 2020-01-29 | Stop reason: HOSPADM

## 2020-01-27 RX ORDER — OXYTOCIN/0.9 % SODIUM CHLORIDE 30/500 ML
340 PLASTIC BAG, INJECTION (ML) INTRAVENOUS CONTINUOUS PRN
Status: DISCONTINUED | OUTPATIENT
Start: 2020-01-27 | End: 2020-01-29 | Stop reason: HOSPADM

## 2020-01-27 RX ORDER — BISACODYL 10 MG
10 SUPPOSITORY, RECTAL RECTAL DAILY PRN
Status: DISCONTINUED | OUTPATIENT
Start: 2020-01-29 | End: 2020-01-29 | Stop reason: HOSPADM

## 2020-01-27 RX ORDER — ONDANSETRON 2 MG/ML
INJECTION INTRAMUSCULAR; INTRAVENOUS PRN
Status: DISCONTINUED | OUTPATIENT
Start: 2020-01-27 | End: 2020-01-27

## 2020-01-27 RX ORDER — LANOLIN 100 %
OINTMENT (GRAM) TOPICAL
Status: DISCONTINUED | OUTPATIENT
Start: 2020-01-27 | End: 2020-01-29 | Stop reason: HOSPADM

## 2020-01-27 RX ORDER — CITRIC ACID/SODIUM CITRATE 334-500MG
30 SOLUTION, ORAL ORAL
Status: COMPLETED | OUTPATIENT
Start: 2020-01-27 | End: 2020-01-27

## 2020-01-27 RX ORDER — CEFAZOLIN SODIUM 2 G/100ML
2 INJECTION, SOLUTION INTRAVENOUS
Status: COMPLETED | OUTPATIENT
Start: 2020-01-27 | End: 2020-01-27

## 2020-01-27 RX ORDER — OXYTOCIN/0.9 % SODIUM CHLORIDE 30/500 ML
PLASTIC BAG, INJECTION (ML) INTRAVENOUS CONTINUOUS PRN
Status: DISCONTINUED | OUTPATIENT
Start: 2020-01-27 | End: 2020-01-27

## 2020-01-27 RX ORDER — SIMETHICONE 80 MG
80 TABLET,CHEWABLE ORAL 4 TIMES DAILY PRN
Status: DISCONTINUED | OUTPATIENT
Start: 2020-01-27 | End: 2020-01-29 | Stop reason: HOSPADM

## 2020-01-27 RX ORDER — ACETAMINOPHEN 325 MG/1
975 TABLET ORAL EVERY 8 HOURS
Status: DISCONTINUED | OUTPATIENT
Start: 2020-01-27 | End: 2020-01-29 | Stop reason: HOSPADM

## 2020-01-27 RX ORDER — ONDANSETRON 2 MG/ML
4 INJECTION INTRAMUSCULAR; INTRAVENOUS EVERY 6 HOURS PRN
Status: DISCONTINUED | OUTPATIENT
Start: 2020-01-27 | End: 2020-01-29 | Stop reason: HOSPADM

## 2020-01-27 RX ORDER — SODIUM CHLORIDE, SODIUM LACTATE, POTASSIUM CHLORIDE, CALCIUM CHLORIDE 600; 310; 30; 20 MG/100ML; MG/100ML; MG/100ML; MG/100ML
INJECTION, SOLUTION INTRAVENOUS CONTINUOUS PRN
Status: DISCONTINUED | OUTPATIENT
Start: 2020-01-27 | End: 2020-01-27

## 2020-01-27 RX ORDER — PROCHLORPERAZINE 25 MG
25 SUPPOSITORY, RECTAL RECTAL EVERY 12 HOURS PRN
Status: DISCONTINUED | OUTPATIENT
Start: 2020-01-27 | End: 2020-01-29 | Stop reason: HOSPADM

## 2020-01-27 RX ORDER — OXYTOCIN 10 [USP'U]/ML
10 INJECTION, SOLUTION INTRAMUSCULAR; INTRAVENOUS
Status: DISCONTINUED | OUTPATIENT
Start: 2020-01-27 | End: 2020-01-29 | Stop reason: HOSPADM

## 2020-01-27 RX ORDER — SODIUM CHLORIDE, SODIUM LACTATE, POTASSIUM CHLORIDE, CALCIUM CHLORIDE 600; 310; 30; 20 MG/100ML; MG/100ML; MG/100ML; MG/100ML
INJECTION, SOLUTION INTRAVENOUS CONTINUOUS
Status: DISCONTINUED | OUTPATIENT
Start: 2020-01-27 | End: 2020-01-27

## 2020-01-27 RX ORDER — OXYCODONE HYDROCHLORIDE 5 MG/1
5-10 TABLET ORAL
Status: DISCONTINUED | OUTPATIENT
Start: 2020-01-27 | End: 2020-01-29 | Stop reason: HOSPADM

## 2020-01-27 RX ORDER — IBUPROFEN 800 MG/1
800 TABLET, FILM COATED ORAL EVERY 6 HOURS PRN
Status: DISCONTINUED | OUTPATIENT
Start: 2020-01-27 | End: 2020-01-29 | Stop reason: HOSPADM

## 2020-01-27 RX ORDER — FENTANYL CITRATE 50 UG/ML
INJECTION, SOLUTION INTRAMUSCULAR; INTRAVENOUS PRN
Status: DISCONTINUED | OUTPATIENT
Start: 2020-01-27 | End: 2020-01-27

## 2020-01-27 RX ORDER — METHYLERGONOVINE MALEATE 0.2 MG/ML
INJECTION INTRAVENOUS
Status: COMPLETED
Start: 2020-01-27 | End: 2020-01-27

## 2020-01-27 RX ORDER — EPHEDRINE SULFATE 50 MG/ML
INJECTION, SOLUTION INTRAMUSCULAR; INTRAVENOUS; SUBCUTANEOUS PRN
Status: DISCONTINUED | OUTPATIENT
Start: 2020-01-27 | End: 2020-01-27

## 2020-01-27 RX ORDER — ACETAMINOPHEN 325 MG/1
650 TABLET ORAL EVERY 4 HOURS PRN
Status: DISCONTINUED | OUTPATIENT
Start: 2020-01-30 | End: 2020-01-29 | Stop reason: HOSPADM

## 2020-01-27 RX ORDER — BUPIVACAINE HYDROCHLORIDE 2.5 MG/ML
INJECTION, SOLUTION EPIDURAL; INFILTRATION; INTRACAUDAL PRN
Status: DISCONTINUED | OUTPATIENT
Start: 2020-01-27 | End: 2020-01-27

## 2020-01-27 RX ORDER — MORPHINE SULFATE 1 MG/ML
INJECTION, SOLUTION EPIDURAL; INTRATHECAL; INTRAVENOUS PRN
Status: DISCONTINUED | OUTPATIENT
Start: 2020-01-27 | End: 2020-01-27

## 2020-01-27 RX ADMIN — ACETAMINOPHEN 975 MG: 325 TABLET, FILM COATED ORAL at 21:20

## 2020-01-27 RX ADMIN — MORPHINE SULFATE 0.2 MG: 1 INJECTION, SOLUTION EPIDURAL; INTRATHECAL; INTRAVENOUS at 08:39

## 2020-01-27 RX ADMIN — Medication 10 MG: at 08:51

## 2020-01-27 RX ADMIN — KETOROLAC TROMETHAMINE 30 MG: 30 INJECTION, SOLUTION INTRAMUSCULAR at 21:55

## 2020-01-27 RX ADMIN — METHYLERGONOVINE MALEATE 0.2 MCG: 0.2 INJECTION INTRAMUSCULAR; INTRAVENOUS at 09:16

## 2020-01-27 RX ADMIN — KETOROLAC TROMETHAMINE 30 MG: 30 INJECTION, SOLUTION INTRAMUSCULAR at 15:36

## 2020-01-27 RX ADMIN — ACETAMINOPHEN 975 MG: 325 TABLET, FILM COATED ORAL at 13:06

## 2020-01-27 RX ADMIN — Medication 10 MG: at 08:49

## 2020-01-27 RX ADMIN — SENNOSIDES AND DOCUSATE SODIUM 1 TABLET: 8.6; 5 TABLET ORAL at 21:20

## 2020-01-27 RX ADMIN — FENTANYL CITRATE 50 MCG: 50 INJECTION, SOLUTION INTRAMUSCULAR; INTRAVENOUS at 09:57

## 2020-01-27 RX ADMIN — OXYTOCIN-SODIUM CHLORIDE 0.9% IV SOLN 30 UNIT/500ML 300 ML/HR: 30-0.9/5 SOLUTION at 09:06

## 2020-01-27 RX ADMIN — ONDANSETRON 4 MG: 2 INJECTION INTRAMUSCULAR; INTRAVENOUS at 09:21

## 2020-01-27 RX ADMIN — Medication 50 MCG/MIN: at 08:39

## 2020-01-27 RX ADMIN — Medication 100 ML/HR: at 11:45

## 2020-01-27 RX ADMIN — BUPIVACAINE 20 ML: 13.3 INJECTION, SUSPENSION, LIPOSOMAL INFILTRATION at 10:03

## 2020-01-27 RX ADMIN — SIMETHICONE CHEW TAB 80 MG 80 MG: 80 TABLET ORAL at 13:06

## 2020-01-27 RX ADMIN — SODIUM CITRATE AND CITRIC ACID MONOHYDRATE 30 ML: 500; 334 SOLUTION ORAL at 08:09

## 2020-01-27 RX ADMIN — SODIUM CHLORIDE, POTASSIUM CHLORIDE, SODIUM LACTATE AND CALCIUM CHLORIDE 1000 ML: 600; 310; 30; 20 INJECTION, SOLUTION INTRAVENOUS at 07:42

## 2020-01-27 RX ADMIN — BUPIVACAINE HYDROCHLORIDE IN DEXTROSE 1.6 ML: 7.5 INJECTION, SOLUTION SUBARACHNOID at 08:39

## 2020-01-27 RX ADMIN — KETOROLAC TROMETHAMINE 30 MG: 30 INJECTION, SOLUTION INTRAMUSCULAR at 09:45

## 2020-01-27 RX ADMIN — SODIUM CHLORIDE, POTASSIUM CHLORIDE, SODIUM LACTATE AND CALCIUM CHLORIDE: 600; 310; 30; 20 INJECTION, SOLUTION INTRAVENOUS at 08:30

## 2020-01-27 RX ADMIN — SODIUM CHLORIDE, SODIUM LACTATE, POTASSIUM CHLORIDE, CALCIUM CHLORIDE AND DEXTROSE MONOHYDRATE: 5; 600; 310; 30; 20 INJECTION, SOLUTION INTRAVENOUS at 16:42

## 2020-01-27 RX ADMIN — SENNOSIDES AND DOCUSATE SODIUM 2 TABLET: 8.6; 5 TABLET ORAL at 13:07

## 2020-01-27 RX ADMIN — BUPIVACAINE HYDROCHLORIDE 20 ML: 2.5 INJECTION, SOLUTION EPIDURAL; INFILTRATION; INTRACAUDAL at 10:03

## 2020-01-27 RX ADMIN — CEFAZOLIN SODIUM 2 G: 2 INJECTION, SOLUTION INTRAVENOUS at 08:40

## 2020-01-27 RX ADMIN — SODIUM CHLORIDE, POTASSIUM CHLORIDE, SODIUM LACTATE AND CALCIUM CHLORIDE: 600; 310; 30; 20 INJECTION, SOLUTION INTRAVENOUS at 08:54

## 2020-01-27 SDOH — HEALTH STABILITY: MENTAL HEALTH: HOW OFTEN DO YOU HAVE A DRINK CONTAINING ALCOHOL?: NEVER

## 2020-01-27 NOTE — ANESTHESIA PROCEDURE NOTES
Spinal/LP Procedure Note    Spinal Block  Staff:     Anesthesiologist:  Ramya Lovell MD  Location: OB  Procedure Start/Stop Times:     patient identified, IV checked, site marked, risks and benefits discussed, informed consent, monitors and equipment checked, pre-op evaluation, at physician/surgeon's request and post-op pain management      Correct Patient: Yes      Correct Position: Yes      Correct Site: Yes      Correct Procedure: Yes      Correct Laterality:  Yes    Site Marked:  Yes  Procedure:     Procedure:  Intrathecal    Position:  Sitting    Sterile Prep: chloraprep      Insertion site:  L3-4    Needle Type:  Jesus    Needle gauge (G):  25    Local Skin Infiltration:  1% lidocaine    Needle Length (in):  3.5    Introducer used: Yes      Introducer gauge:  20 G    Attempts:  1    Redirects:  0    CSF:  Clear    Paresthesias:  No    Time injected:  08:39

## 2020-01-27 NOTE — PLAN OF CARE
Ramya Dale transferred to [facility] at 1230 via zoom cart with baby in arms. Tolerated transfer well. Orientation provided to Pt and spouse Aditya. Report from AMY Carl and double checked bands. Family has no concerns or questions at the moment. Will continue with plan of care.

## 2020-01-27 NOTE — PLAN OF CARE
"Pt is here with Aditya for her 0830 scheduled repeat c/s.  A- had Rhogam during pregnancy.  Not feeling painful. ctxs.  Intact. No bloody show today, but reported a little amount yesterday.  Ctxs q 3-5\"  soft.   mod w accels. RNST.  Seen by Dr Ricci, anesthesia.  Prep completed.  Abuse screen not completed as pt not alone.  "

## 2020-01-27 NOTE — ANESTHESIA CARE TRANSFER NOTE
Patient: Ramya BALBUENA    Procedure(s):  REPEAT  SECTION    Diagnosis: Encounter for supervision of multigravida of advanced maternal age in third trimester [O09.523]  History of  section complicating pregnancy [O34.219]  Diagnosis Additional Information: No value filed.    Anesthesia Type:   Spinal, Peripheral Nerve Block, For Post-op pain in coordination with surgeon     Note:  Airway :Room Air  Patient transferred to:Labor and Delivery  Comments: Report to AMY Schwab  Pt awake, TAP effective, pt more comfortable.  SAB L4  SaO2 99 % RA  110/55  Temp 36.4 Lynnette Report: Identifed the Patient, Identified the Reponsible Provider, Reviewed the pertinent medical history, Discussed the surgical course, Reviewed Intra-OP anesthesia mangement and issues during anesthesia, Set expectations for post-procedure period and Allowed opportunity for questions and acknowledgement of understanding      Vitals: (Last set prior to Anesthesia Care Transfer)    CRNA VITALS  2020 0947 - 2020 1024      2020             NIBP:  110/55    Ht Rate:  76                Electronically Signed By: CECILIA Alexandra CRNA  2020  10:24 AM

## 2020-01-27 NOTE — OP NOTE
[facility]  Full Operative Progress Note     Surgery Date:  2020    Surgeon:  Yadi Ricci MD    Assistants:  Diane Childers MD, PGY-2        Pre-op Diagnosis:    Intrauterine pregnancy at 39w3d  AMA  History of  section x2  Rh negative  Obesity    Post-op Diagnosis:    Same   Liveborn male infant     Procedure:  Repeat low-transverse  section with double layer uterine closure via Pfannenstiel incision    Anesthesia: Spinal    EBL:  752 mL    IVF:  700 mL crystalloid    UOP:  30 mL clear urine at the end of the case    Drains: Brooks Catheter     Specimens:  Cord segment    Complications:  None apparent    Indications:   Ramya BALBUENA is a 39 year old  at 39w3d admitted for scheduled  section.  Pregnancy notable for two prior  sections, Th negative, obesity and AMA.  The risks, benefits, and alternatives of  section were discussed with the patient, and she agreed to proceed.     Findings:   - Viable male infant delivered at 0904 on 2020 with APGARs 8 and 9. Weight 8 lb 0 oz.    - Clear amniotic fluid, Placenta intact.   - Normal uterus, tubes, and ovaries.   - Moderate rectofascial adhesions. Scant omental adhesions to anterior abdominal wall on right  - Surgical sites noted to be hemostatic at the end of case.     Procedure Details:   The patient was brought to the OR, where adequate anesthesia was administered.  She was placed in the dorsal supine position with a slight leftward tilt. She was prepped and draped in the usual sterile fashion. A surgical time out was performed. A pfannenstiel skin incision was made with the scalpel, and carried down to the underlying fascia with sharp and blunt dissection. The fascia was incised in the midline, and the incision was extended laterally with the Pleitez scissors. The superior aspect of the fascia was grasped with the Kocher clamps and dissected off of the underlying rectus  muscles with blunt and sharp dissection. Attention was then turned to the inferior aspect of the fascia, which was similarly dissected off of the underlying rectus muscles. The rectus muscles were  in the midline, and the peritoneum was entered bluntly, and the opening was extended with digital pressure. The bladder blade was placed. The vesicouterine peritoneum was incised in the midline, and the incision was extended laterally with the Metzenbaum scissors. A bladder flap was created digitally and the bladder blade was replaced. A transverse hysterotomy was made with the scalpel in the lower uterine segment, and the incision was extended with digital pressure. The infant was noted to be in the TIGIST position, and was delivered atraumatically. The shoulders delivered easily.  No nuchal cord was noted. The cord was doubly clamped and cut after one minute, and the infant was handed off to the awaiting nursery staff. A segment of cord was cut and saved. The placenta was delivered with gentle traction on the umbilical cord and uterine massage. The uterus was cleared of all clots and debris. Uterine tone was noted to be firm with 40 units of pitocin given through the running IV and uterine massage.  The hysterotomy was closed with a running locked suture of 0 Vicryl.  The hysterotomy was then imbricated using an 0 Vicryl suture. The hysterotomy was noted to be hemostatic. The pericolic gutters were cleared of all clots and debris. The hysterotomy was reexamined and noted to be hemostatic. The fascia and rectus muscles were examined and areas of oozing were controlled with electrocautery. Interceed was placed over hysterotomy and uterine fundus The fascia was closed with a running 0 Vicryl suture. The subcutaneous tissue was irrigated and areas of oozing were controlled with electrocautery. The subcutaneous tissue was more than 2 cm in thickness, and was therefore closed. The skin was closed with 4-0 Monocryl and  covered with a sterile dressing.    All sponge, needle, and instrument counts were correct. The patient tolerated the procedure well, and was transferred to recovery in stable condition. Dr. Ricci was present and scrubbed for the entirety of the procedure.     Diane Childers MD  OBGYN PGY-2  10:58 AM 1/27/2020    ATTESTATION:   I was scrubbed and present for the entire procedure. I agree with the above note which I have edited to reflect my findings.   Yadi Ricci MD

## 2020-01-27 NOTE — ANESTHESIA PREPROCEDURE EVALUATION
Anesthesia Pre-Procedure Evaluation    Patient: Ramya BALBUENA   MRN:     [id number] Gender:   female   Age:    39 year old :      1980        Preoperative Diagnosis: Encounter for supervision of multigravida of advanced maternal age in third trimester [O09.523]  History of  section complicating pregnancy [O34.219]   Procedure(s):  REPEAT  SECTION     Past Medical History:   Diagnosis Date     Abnormal Pap smear          LSIL (low grade squamous intraepithelial lesion) on Pap smear 2011    colp - WNl      Past Surgical History:   Procedure Laterality Date      SECTION N/A 2014    Procedure:  SECTION;  Surgeon: Maribel Nixon MD;  Location: [facility] L[facility]      SECTION N/A 2017    Procedure:  SECTION;  Surgeon: Diane Anderson MD;  Location: [facility] L+[facility]     HC EXC LESION OF TONGUE W/O CLOSURE      benign          Anesthesia Evaluation     . Pt has had prior anesthetic. Type: Regional           ROS/MED HX    ENT/Pulmonary:       Neurologic:       Cardiovascular:         METS/Exercise Tolerance:     Hematologic:         Musculoskeletal:         GI/Hepatic:         Renal/Genitourinary:         Endo:     (+) Obesity, .      Psychiatric:         Infectious Disease:         Malignancy:         Other:                     JZG FV AN PHYSICAL EXAM    LABS:  CBC:   Lab Results   Component Value Date    WBC 11.8 (H) 2020    WBC 10.8 2019    HGB 11.2 (L) 2020    HGB 11.2 (L) 2020    HCT 34.4 (L) 2020    HCT 37.7 2019     2020     2019     BMP:   Lab Results   Component Value Date    GLC 80 12/15/2015     COAGS: No results found for: PTT, INR, FIBR  POC:   Lab Results   Component Value Date    HCG Positive (A) 2019     OTHER: No results found for: PH, LACT, A1C, MARRY, PHOS, MAG, ALBUMIN, PROTTOTAL, ALT, AST, GGT, ALKPHOS, BILITOTAL, BILIDIRECT, LIPASE, AMYLASE, WILBUR, TSH, T4, T3, CRP, SED  "    Preop Vitals    BP Readings from Last 3 Encounters:   01/23/20 125/77   01/17/20 119/77   01/10/20 122/78    Pulse Readings from Last 3 Encounters:   01/23/20 83   01/17/20 77   12/20/19 69      Resp Readings from Last 3 Encounters:   12/20/19 14   12/04/19 16   06/14/19 16    SpO2 Readings from Last 3 Encounters:   12/20/19 98%   12/04/19 100%   11/08/19 100%      Temp Readings from Last 1 Encounters:   01/23/20 36  C (96.8  F) (Oral)    Ht Readings from Last 1 Encounters:   12/20/19 1.549 m (5' 1\")      Wt Readings from Last 1 Encounters:   01/23/20 106.6 kg (235 lb)    Estimated body mass index is 44.4 kg/m  as calculated from the following:    Height as of 12/20/19: 1.549 m (5' 1\").    Weight as of 1/23/20: 106.6 kg (235 lb).     LDA:        Assessment:   ASA SCORE: 3            Plan:   Anes. Type:  Spinal; Peripheral Nerve Block; For Post-op pain in coordination with surgeon     Block Details: TAP; Exparel; Single Shot   Pre-Medication: None   Induction:  N/a   Airway: Native Airway   Access/Monitoring: PIV   Maintenance: N/a     Postop Plan:   Postop Pain: Regional  Postop Sedation/Airway: Not planned  Disposition: Inpatient/Admit     PONV Management: Adult Risk Factors: Female                   Ramya Lovell MD  "

## 2020-01-27 NOTE — PROGRESS NOTES
Summary:  Transferred to 71, holding  in her arms via cart,  in stable condition and comfortable.  Total VHZ=942.  Pt had methergine in OR.  Pt had toradol and Norris block while in OR.  Breast fed well.  Bedside report and id bands checked and matched with AMY Garcia.  Call light is within reach.

## 2020-01-27 NOTE — TELEPHONE ENCOUNTER
Forms received and filled out. Placed in provider's basket to be reviewed and signed. Will fax to [phone] when returned.  Reshma Tinoco,

## 2020-01-27 NOTE — H&P
History and Physical     Ramya BALBUENA MRN# [id number]   YOB: 1980 Age: 39 year old      Date of Admission: 2020    Primary care provider: Naida Elliott          HPI:     Ramya BALBUENA is a 39 year old  at 39w3d by LMP c/w 14w3d US who presents today for scheduled  section. Feeling well today. Excited to meet baby.     Pregnancy notable for:   AMA  History of  section x2  Rh negative  Obesity    She reports good fetal movement. Denies LOF, vaginal bleeding, or contractions.  She denies fever, chills, SOB, chest pain, palpitations, N/V, LE swelling/tenderness.  No concerns for headache, vision changes, RUQ or epigastric pain      Patient has been NPO since before midnight    OB History:    OB History    Para Term  AB Living   3 2 2 0 0 2   SAB TAB Ectopic Multiple Live Births   0 0 0 0 2      # Outcome Date GA Lbr Erik/2nd Weight Sex Delivery Anes PTL Lv   3 Current            2 Term 17 39w6d  4.082 kg (9 lb) F CS-LTranv Spinal, EPI  LEONEL      Birth Comments:       Name: Anja      Apgar1: 8  Apgar5: 9   1 Term 14 42w0d  3.941 kg (8 lb 11 oz) M CS-LTranv EPI  LEONEL      Apgar1: 8  Apgar5: 9        Prenatal Lab Results:  Lab Results   Component Value Date    ABO A 2020    RH Neg 2020    AS Neg 2020    HEPBANG Nonreactive 2019    CHPCRT  2016     Negative   Negative for C. trachomatis rRNA by transcription mediated amplification.   A negative result by transcription mediated amplification does not preclude the   presence of C. trachomatis infection because results are dependent on proper   and adequate collection, absence of inhibitors, and sufficient rRNA to be   detected.      GCPCRT  2016     Negative   Negative for N. gonorrhoeae rRNA by transcription mediated amplification.   A negative result by transcription mediated amplification does not preclude the   presence of N. gonorrhoeae infection  because results are dependent on proper   and adequate collection, absence of inhibitors, and sufficient rRNA to be   detected.      TREPAB Negative   STAT   2017    RPR Negative 2008    HGB 11.2 (L) 2020    HIV Negative 2009       GBS Status:   Lab Results   Component Value Date    GBS Negative 2020                Past Medical History:     Past Medical History:   Diagnosis Date     Abnormal Pap smear          LSIL (low grade squamous intraepithelial lesion) on Pap smear 2011    colp - WNl             Past Surgical History:     Past Surgical History:   Procedure Laterality Date      SECTION N/A 2014    Procedure:  SECTION;  Surgeon: Maribel Nixon MD;  [facility]      SECTION N/A 2017    Procedure:  SECTION;  Surgeon: Diane Anderson MD;  [facility]     HC EXC LESION OF TONGUE W/O CLOSURE      benign             Social History:     Social History     Tobacco Use     Smoking status: Never Smoker     Smokeless tobacco: Never Used   Substance Use Topics     Alcohol use: Yes     Alcohol/week: 0.0 standard drinks     Comment: socially/not  now              Family History:     Family History   Problem Relation Age of Onset     Heart Disease Paternal Grandfather      Heart Disease Maternal Grandfather      Allergies Mother      Eye Disorder Mother      Allergies Maternal Grandmother      Cerebrovascular Disease Maternal Grandmother 92     Depression Maternal Uncle              Immunizations:     Immunization History   Administered Date(s) Administered     HPV 2009, 2009, 2010     HepA-Adult 2019     HepB 1994, 1994, 1994     Influenza (IIV3) PF 2011, 2012, 10/03/2013     Influenza Vaccine IM > 6 months Valent IIV4 10/10/2014, 10/25/2019     Influenza Vaccine, 3 YRS +, IM (QUADRIVALENT W/PRESERVATIVES) 10/03/2016     Rhogam 2014, 2014, 2016, 2017,  10/25/2019     TD (ADULT, 7+) 1994     TDAP Vaccine (Adacel) 2008, 2014, 2016, 2019     Typhoid IM 2019            Allergies:     Allergies   Allergen Reactions     Cats      Pollen Extract      Ragweed - Fall  Usually also bothered for a week in the spring.     Codeine Nausea             Medications:     Medications Prior to Admission   Medication Sig Dispense Refill Last Dose     [] Misc. Devices (BREAST PUMP) MISC 1 each once for 1 dose 1 each 0      order for DME Equipment being ordered: double electric breast pump 1 Device 0 Taking     Prenatal Multivit-Min-Fe-FA (PRENATAL VITAMINS PO)    Taking             Review of Systems & Physical Exam:     The Review of Systems is negative other than noted in the HPI      /72   Pulse 71   Temp 98.5  F (36.9  C) (Oral)   Resp 16   LMP 2019   Gen: well-appearing, comfortable  CV: RRR, nl S1/S2, no murmurs/clicks/gallops  Lungs: CTAB, non-labored breathing  Abd: Gravid, non-tender, non-distended  Ext: 1+ peripheral extremity edema    Presentation: Ceph by US .  Estimated Fetal Weight: 8lb    FHT:  Monitoring External  FHT: Baseline 140 bpm; mod variability; accels present; no decelerations  TOCO 2-3 contractions in 10 minutes         Data:   CBC, T&S, RPR     Assessment and Plan:       39 year old  at 39w3d by 9w3d US, here for scheduled  section. Pregnancy is notable for history of CS x2, AMA, Rh negative, and obesity.     # Scheduled Repeat  Section  # History of CS x2  Indicated due to repeat. Prior (s) for Arrest of dilation and failed TOLAC. Previous op notes reported minimal omental adhesions to anterior abdominal wall and abdominal wall adhesions. Will plan for a Pfannenstiel skin incision with low transverse hysterotomy  - Labs: CBC, T&S, RPR   - Pre-op Hgb 11.2, Plts 259  - Placenta: anterior  - Anesthesia: Spinal   - 2g Ancef   - PPH Meds/Ppx: no contraindications  -  Diet: NPO  - PPx: SCDs  - Consent: Discussed risks and benefits of procedure, including but not limited to bleeding, infection, injury to surrounding organs, injury to infant, and the potential need for another surgery should some injury go unrecognized or patient were to have continued bleeding. Patient had time to ask questions and expressed understanding of procedure and associated risks. Agreed to blood transfusion if necessary. Consent signed.    # AMA  - Nl NIPT, MS AFP    # PNC  - Rh negative s/p Rhogam, Rhogam eval PP  - Rubella immune, GCT 97, GBS negative  - Other prenatal labs wnl  - S/p flu, Tdap vaccines  - Pap last 2019  - Contraception: vasectomy       # FWB:   Cat I tracing, reactive; Cephalic by BSUS; EFW 8lb  - Continuous Fetal Monitoring  - Intrauterine resuscitative measures prn      Patient discussed with Dr. Radhames Childers MD  Obstetrics & Gynecology, PGY-2  2020 7:46 AM      Physician Attestation   I, Yadi Ricci MD, personally examined and evaluated this patient.  I discussed the patient with the resident/fellow and care team, and agree with the assessment and plan of care as documented in the note of 20.      I personally reviewed vital signs, medications, labs, imaging and exam and fetal tracing.    Key findings: 39 year old  at 39w3d here for scheduled repeat CS. Pregnancy complicated by AMA, obesity, Rh neg and h/o CS x2. Category 1 reactive NST. Discussed risks of  section. Risks include but are not limited to infection that may need antibiotics to treat, injury to other organs, risk of excessive blood loss and potential need for blood transfusion. Patient consents to transfusion of blood products if indicated. Discussed risks of anesthesia and DVT. Verbal and written consent obtained.    Yadi Ricci MD  Date of Service (when I saw the patient): 20

## 2020-01-27 NOTE — ANESTHESIA POSTPROCEDURE EVALUATION
Anesthesia POST Procedure Evaluation    Patient: Ramya BALBUENA   MRN:     [id number] Gender:   female   Age:    39 year old :      1980        Preoperative Diagnosis: Encounter for supervision of multigravida of advanced maternal age in third trimester [O09.523]  History of  section complicating pregnancy [O34.219]   Procedure(s):  REPEAT  SECTION   Postop Comments: No value filed.       Anesthesia Type:  Not documented  Spinal, Peripheral Nerve Block, For Post-op pain in coordination with surgeon    Reportable Event: NO     PAIN: Uncomplicated   Sign Out status: Comfortable, Well controlled pain     PONV: No PONV   Sign Out status:  No Nausea or Vomiting     Neuro/Psych: Uneventful perioperative course   Sign Out Status: Preoperative baseline; Age appropriate mentation     Airway/Resp.: Uneventful perioperative course   Sign Out Status: Non labored breathing, age appropriate RR; Resp. Status within EXPECTED Parameters     CV: Uneventful perioperative course   Sign Out status: Appropriate BP and perfusion indices; Appropriate HR/Rhythm     Disposition:   Sign Out in:  Labor and Delivery  Disposition:  Floor (post-partum)  Recovery Course: Uneventful  Follow-Up: Not required           Last Anesthesia Record Vitals:  CRNA VITALS  2020 0947 - 2020 1047      2020             NIBP:  110/55    Ht Rate:  76          Last PACU Vitals:  Vitals Value Taken Time   /70 2020 12:00 PM   Temp 36.3  C (97.3  F) 2020 11:16 AM   Pulse     Resp 16 2020 12:00 PM   SpO2 99 % 2020 12:00 PM   Temp src Available 2020 10:15 AM   NIBP 110/55 2020 10:17 AM   Pulse     SpO2     Resp     Temp     Ht Rate 76 2020 10:17 AM   Temp 2           Electronically Signed By: Ramya Lovell MD, 2020, 12:20 PM

## 2020-01-27 NOTE — PLAN OF CARE
Data: VSS, postpartum assessments WNL. She is voiding adequate amounts of clear urine in burgess, eating and drinking normally. Incision dressing is clean dry intact, lochia WNL, no s/s infection. Breastfeeding infant with no assistance, states she breast fed her other two children without difficulty. Taking tylenol and toradol for pain. She reports good pain management.   Action: Education provided on plan of care.  Response: Pt is agreeable with her plan of care. Positive attachment behaviors observed with infant. Support person  Aditya present. Will continue with plan of care.

## 2020-01-27 NOTE — BRIEF OP NOTE
Brief Operative Note    Name: Ramya BALBUENA  MRN: [id number]  : 1980  Date of Surgery: 2020    Pre-operative Diagnosis:  IUP @ 39w3d  AMA  History of  section x2  Rh negative  Obesity       Post-operative Diagnosis:  same    Procedure(s):   Repeat low transverse  section with double layer uterine closure via Pfannenstiel skin incision    Surgeon:  Yadi Ricci MD    Assistants:  Diane Childers MD, PGY2     Anesthesia: Spinal  EBL: 656 ml  UOP: 30 mL clear urine  Fluids: 700 mL crystalloid  Additional Medications: 2g Ancef preop  Specimens: Cord blood  Complications: None apparent    Findings:   - Viable male infant delivered at 0904 on 2020 with APGARs 8 and 9. Weight 8 lb 0 oz.    - Clear amniotic fluid, Placenta intact.   - Normal uterus, tubes, and ovaries.   - Moderate rectofascial adhesions. Scant omental adhesions to anterior abdominal wall on right  - Surgical sites noted to be hemostatic at the end of case.     Diane Childers MD  Obstetrics & Gynecology, PGY-2  2020 9:59 AM

## 2020-01-27 NOTE — ANESTHESIA PROCEDURE NOTES
Peripheral Nerve Block Procedure Note    Staff:     Anesthesiologist:  Ramya Lovell MD  Location: OB and OR AFTER induction  Procedure Start/Stop TImes:     patient identified, IV checked, site marked, risks and benefits discussed, informed consent, monitors and equipment checked, pre-op evaluation, at physician/surgeon's request and post-op pain management      Correct Patient: Yes      Correct Position: Yes      Correct Site: Yes      Correct Procedure: Yes      Correct Laterality:  Yes    Site Marked:  Yes  Procedure details:     Procedure:  TAP    Laterality:  Bilateral    Position:  Supine    Sterile Prep: chloraprep      Needle:  Short bevel    Needle gauge:  21    Needle length (mm):  110    Ultrasound: Yes      Ultrasound used to identify targeted nerve, plexus, or vascular structure and placed a needle adjacent to it      Permanent Image entered into patiient's record      Abnormal pain on injection: No      Blood Aspirated: No      Paresthesias:  No    Bleeding at site: No      Bolus via:  Needle    Infusion Method:  Single Shot    Complications:  None

## 2020-01-27 NOTE — DISCHARGE SUMMARY
[facility] Discharge Summary    Ramya BALBUENA MRN# [id number]   Age: 39 year old YOB: 1980     Date of Admission:  2020  Date of Discharge::  20  Admitting Physician:  Yadi Ricci MD  Discharge Physician:  Yadi Ricci MD             Admission Diagnoses:   Intrauterine pregnancy at 39w3d  AMA  History of  section x2  Rh negative  Obesity          Discharge Diagnosis:     Same, delivered   Acute blood loss anemia          Procedures:     Procedure(s): Repeat low transverse  section with double layer closure via Pfannenstiel  skin incision  Spinal  TAP blocks                 Medications Prior to Admission:     Medications Prior to Admission   Medication Sig Dispense Refill Last Dose     Prenatal Multivit-Min-Fe-FA (PRENATAL VITAMINS PO)    Past Month at Unknown time     [] Misc. Devices (BREAST PUMP) MISC 1 each once for 1 dose 1 each 0      order for DME Equipment being ordered: double electric breast pump 1 Device 0 Unknown at Unknown time             Discharge Medications:        Review of your medicines      UNREVIEWED medicines. Ask your doctor about these medicines      Dose / Directions   breast pump Misc  Used for:  Encounter for supervision of multigravida of advanced maternal age in third trimester  Ask about: Should I take this medication?      Dose:  1 each  1 each once for 1 dose  Quantity:  1 each  Refills:  0        START taking      Dose / Directions   ferrous gluconate 324 (38 Fe) MG tablet  Commonly known as:  FERGON  Used for:  Anemia due to blood loss, acute      Dose:  324 mg  Take 1 tablet (324 mg) by mouth daily (with breakfast)  Quantity:  30 tablet  Refills:  3     ibuprofen 600 MG tablet  Commonly known as:  ADVIL/MOTRIN  Used for:  S/P       Dose:  600 mg  Take 1 tablet (600 mg) by mouth every 6 hours as needed  Quantity:  40 tablet  Refills:  0     SENNA-docusate sodium 8.6-50 MG tablet  Commonly known as:   SENNA S  Used for:  S/P       Dose:  1 tablet  Take 1 tablet by mouth At Bedtime  Quantity:  90 tablet  Refills:  0        CONTINUE these medicines which have NOT CHANGED      Dose / Directions   order for DME  Used for:  Exclusively breastfeed infant      Equipment being ordered: double electric breast pump  Quantity:  1 Device  Refills:  0     PRENATAL VITAMINS PO      Refills:  0           Where to get your medicines      These medications were sent to [place], [place] - [address]  [address] [place] [address], [facility] [address]    Phone:  [phone]     ferrous gluconate 324 (38 Fe) MG tablet    ibuprofen 600 MG tablet    SENNA-docusate sodium 8.6-50 MG tablet                 Consultations:   Anesthesiology  Blood bank          Brief Admission History:   Ramya BALBUENA is a 39 year old  at 39w3d admitted for scheduled  section.  Pregnancy notable for two prior  sections, Th negative, obesity and AMA.  The risks, benefits, and alternatives of  section were discussed with the patient, and she agreed to proceed.       Intraoperative course   The procedure was uncomplicated.   mL.  See operative report for details.     - Viable male infant delivered at 0904 on 2020 with APGARs 8 and 9. Weight 8 lb 0 oz.    - Clear amniotic fluid, Placenta intact.   - Normal uterus, tubes, and ovaries.   - Moderate rectofascial adhesions. Scant omental adhesions to anterior abdominal wall on right  - Surgical sites noted to be hemostatic at the end of case.        Postpartum Course   The patient's hospital course was unremarkable.  She recovered as anticipated and experienced no post-operative complications.  On discharge, her pain was well controlled. Vaginal bleeding is similar to peak menstrual flow.  Voiding without difficulty.  Ambulating well and tolerating a normal diet.  No fever or significant wound drainage.  Breastfeeding well.   Infant is stable.  Regaining bowel function.  She was discharged on post-partum day #2.    Post-partum hemoglobin:   Hemoglobin   Date Value Ref Range Status   01/28/2020 9.4 (L) 11.7 - 15.7 g/dL Final             Discharge Instructions and Follow-Up:     Discharge diet: Regular   Discharge activity: No lifting greater than 20 lbs, pushing, pulling, or other strenuous activity for 6 weeks. Pelvic rest for 6 weeks including no sexual intercourse, tampons, or douching. No driving until you can slam on the breaks without pain or while on narcotic pain medications.    Discharge follow-up: Follow up with primary OB for routine postpartum visit in 6 weeks     Wound care: Keep incision clean and dry           Discharge Disposition:     Discharged to home      Amy Schumer, MD  Obstetrics and Gynecology, PGY-3  1/29/20             Physician Attestation   I, Yadi Ricci, saw and evaluated this patient prior to discharge.  I discussed the patient with the resident/fellow and agree with plan of care as documented in the note.      I personally reviewed vital signs, medications, labs and exam.    I personally spent 20 minutes on discharge activities.    Yadi Ricci MD  Date of Service (when I saw the patient): 01/29/20

## 2020-01-28 LAB
ABO + RH BLD: NORMAL
ABO + RH BLD: NORMAL
BLOOD BANK CMNT PATIENT-IMP: NORMAL
DATE RH IMM GL GVN: NORMAL
FETAL CELL SCN BLD QL ROSETTE: NORMAL
HGB BLD-MCNC: 9.4 G/DL (ref 11.7–15.7)
RH IG VIALS RECOM PATIENT: NORMAL

## 2020-01-28 PROCEDURE — 3E0234Z INTRODUCTION OF SERUM, TOXOID AND VACCINE INTO MUSCLE, PERCUTANEOUS APPROACH: ICD-10-PCS | Performed by: OBSTETRICS & GYNECOLOGY

## 2020-01-28 PROCEDURE — 85461 HEMOGLOBIN FETAL: CPT | Performed by: OBSTETRICS & GYNECOLOGY

## 2020-01-28 PROCEDURE — 86900 BLOOD TYPING SEROLOGIC ABO: CPT | Performed by: OBSTETRICS & GYNECOLOGY

## 2020-01-28 PROCEDURE — 36415 COLL VENOUS BLD VENIPUNCTURE: CPT | Performed by: OBSTETRICS & GYNECOLOGY

## 2020-01-28 PROCEDURE — 25000132 ZZH RX MED GY IP 250 OP 250 PS 637: Performed by: STUDENT IN AN ORGANIZED HEALTH CARE EDUCATION/TRAINING PROGRAM

## 2020-01-28 PROCEDURE — 86901 BLOOD TYPING SEROLOGIC RH(D): CPT | Performed by: OBSTETRICS & GYNECOLOGY

## 2020-01-28 PROCEDURE — 25000128 H RX IP 250 OP 636: Performed by: STUDENT IN AN ORGANIZED HEALTH CARE EDUCATION/TRAINING PROGRAM

## 2020-01-28 PROCEDURE — 25000128 H RX IP 250 OP 636: Performed by: OBSTETRICS & GYNECOLOGY

## 2020-01-28 PROCEDURE — 12000001 ZZH R&B MED SURG/OB UMMC

## 2020-01-28 PROCEDURE — 36415 COLL VENOUS BLD VENIPUNCTURE: CPT | Performed by: STUDENT IN AN ORGANIZED HEALTH CARE EDUCATION/TRAINING PROGRAM

## 2020-01-28 PROCEDURE — 85018 HEMOGLOBIN: CPT | Performed by: STUDENT IN AN ORGANIZED HEALTH CARE EDUCATION/TRAINING PROGRAM

## 2020-01-28 RX ORDER — FERROUS GLUCONATE 324(38)MG
324 TABLET ORAL
Qty: 30 TABLET | Refills: 3 | Status: SHIPPED | OUTPATIENT
Start: 2020-01-28 | End: 2021-10-08

## 2020-01-28 RX ORDER — IBUPROFEN 600 MG/1
600 TABLET, FILM COATED ORAL EVERY 6 HOURS PRN
Qty: 40 TABLET | Refills: 0 | Status: SHIPPED | OUTPATIENT
Start: 2020-01-28 | End: 2021-10-08

## 2020-01-28 RX ORDER — SENNA AND DOCUSATE SODIUM 50; 8.6 MG/1; MG/1
1 TABLET, FILM COATED ORAL AT BEDTIME
Qty: 90 TABLET | Refills: 0 | Status: SHIPPED | OUTPATIENT
Start: 2020-01-28 | End: 2021-03-19

## 2020-01-28 RX ADMIN — SENNOSIDES AND DOCUSATE SODIUM 2 TABLET: 8.6; 5 TABLET ORAL at 07:56

## 2020-01-28 RX ADMIN — KETOROLAC TROMETHAMINE 30 MG: 30 INJECTION, SOLUTION INTRAMUSCULAR at 10:39

## 2020-01-28 RX ADMIN — IBUPROFEN 800 MG: 800 TABLET, FILM COATED ORAL at 22:39

## 2020-01-28 RX ADMIN — HUMAN RHO(D) IMMUNE GLOBULIN 300 MCG: 300 INJECTION, SOLUTION INTRAMUSCULAR at 22:27

## 2020-01-28 RX ADMIN — ACETAMINOPHEN 975 MG: 325 TABLET, FILM COATED ORAL at 12:31

## 2020-01-28 RX ADMIN — ACETAMINOPHEN 975 MG: 325 TABLET, FILM COATED ORAL at 04:44

## 2020-01-28 RX ADMIN — KETOROLAC TROMETHAMINE 30 MG: 30 INJECTION, SOLUTION INTRAMUSCULAR at 04:44

## 2020-01-28 RX ADMIN — ACETAMINOPHEN 975 MG: 325 TABLET, FILM COATED ORAL at 20:30

## 2020-01-28 RX ADMIN — IBUPROFEN 800 MG: 800 TABLET, FILM COATED ORAL at 16:36

## 2020-01-28 NOTE — PROVIDER NOTIFICATION
01/28/20 0513   Provider Notification   Provider Name/Title Dr. Saini   Method of Notification Electronic Page   Request Evaluate-Remote   Notification Reason Status Update     FYI Pt urine output is still low/normal overnight. D5LR finished @004

## 2020-01-28 NOTE — PLAN OF CARE
VSS. Fundus Midline, firm, and U/U. Scant lochia. Incision covered, dressing C/D/I. Pain adequately managed with tylenol and ibuprofen. Ambulating independently, tolerating regular diet, and voiding without difficulty. Breastfeeding independently with good latch. Bonding well with . Continue with plan of care.

## 2020-01-28 NOTE — PROGRESS NOTES
[facility]   Post-partum Note    Name:  Ramya BALBUENA  MRN: [id number]    S: Patient is doing well.  Pain is well controlled.  Tolerating regular diet without nausea or vomiting.  Ambulating without dizziness.  Lochia is minimal but unsure given only out of bed once so far.  Brooks out, urinated a small trickle immediately after catheter removal.  Scant flatus.  Breast feeding.      O:   Patient Vitals for the past 24 hrs:   BP Temp Temp src Heart Rate Resp SpO2   20 0400 104/51 98.7  F (37.1  C) Oral 73 18 99 %   20 0000 98/51 98.9  F (37.2  C) Oral 66 18 100 %   20 2118 121/64 98.6  F (37  C) Oral 76 18 100 %   20 1740 125/86 98.2  F (36.8  C) Oral 67 17 97 %   20 1630 116/68 -- -- 74 17 98 %   20 1535 112/77 -- -- 76 17 100 %   20 1435 120/68 97.8  F (36.6  C) Oral 71 17 99 %   20 1335 126/68 -- -- 85 16 98 %   20 1300 119/72 -- -- 79 16 98 %   20 1230 109/55 98.6  F (37  C) Oral 67 16 99 %   20 1200 106/70 -- -- -- 16 99 %   20 1145 98/51 -- -- -- 16 99 %   20 1131 99/72 -- -- -- 16 99 %   20 1116 99/73 97.3  F (36.3  C) Axillary -- 16 98 %   20 1101 104/70 -- -- -- 16 98 %   20 1045 100/83 -- -- -- 16 98 %   20 1030 107/76 -- -- -- 16 98 %   20 1015 110/55 97.6  F (36.4  C) Oral -- 16 98 %     Gen:  Resting comfortably, NAD  CV:  Regular rate  Pulm:  Unlabored breathing  Abd:  Soft, appropriately ttp, non-distended.Fundus below umbilicus, firm and non-tender.  Ext:  non-tender,  trace LE edema b/l    I/O last 3 completed shifts:  In:  [P.O.:330; I.V.:1752]  Out: 1536 [Urine:775; Blood:761]    Hgb:   Hemoglobin   Date Value Ref Range Status   2020 11.2 (L) 11.7 - 15.7 g/dL Final       Assessment/Plan:  39 year old  on POD#1 s/p RLTCS.  - continue with routine postpartum management  Pain: Well-controlled with Toradol and tylenol, prn oxycodone. Transition to  ibuprofen today.   Hgb: 11.2>> AM hgb pending  Rh: Neg, Rhogam eval in process (baby is Rh pos)  Rubella: imm  Feed: breast  BC: vasectomy  Dispo: DC POD#2-3 when meeting goals    Amy Schumer, MD  Ob/Gyn, PGY-3  01/28/20

## 2020-01-28 NOTE — PLAN OF CARE
Patient vital signs stable. Postpartum assessment within normal limits. Pain managed with tylenol and ibuprofen. Up ad ana cristina. Breastfeeding independently. Brooks cath removed @0500. Encouraged to continue drinking oral fluids. Continue with plan of care

## 2020-01-28 NOTE — PROGRESS NOTES
Post  Anesthesia Follow Up Note    Patient: Ramya BALBUENA    Patient location: Postpartum floor.    Chief complaint: Acute postoperative pain management s/p intrathecal Bupivacaine + morphine administration     Procedure(s) Performed:  Procedure(s):  REPEAT  SECTION    Anesthesia type: Spinal Block and Bilateral TAP block    Subjective:     Pain Control: 1-2/10 at rest and 4/10 with ambulation    Additional ROS:  She does not complain of pruritis at this time. She denies weakness, denies paresthesia, denies difficulties breathing or voiding, denies nausea or vomiting. She is able to ambulate and tolerates regular diet.    Objective:    Respiratory Function (RR / SpO2 / Airway Patency): Satisfactory    Cardiac Function (HR / Rhythm / BP): Satisfactory    Strength and sensation lower extremities: Normal    Site of spinal/epidural insertion: No signs of infection or inflammation.     Last Vitals: /51   Pulse 71   Temp 37.1  C (98.7  F) (Oral)   Resp 18   LMP 2019   SpO2 99%   Breastfeeding Unknown     Assessment and plan:   Ramya BALBUENA is a 39 year old female  POD #1 s/p   SECTION with IT 0.75 % hyperbaric bupivacaine (1.6ml) and morphine (0.2mg); and single shot TAP nerve block injections with 20 mL bupivacaine 0.25% followed by 20mL liposome bupivacaine (Exparel) long-acting 1.3% given in the PACU for postoperative analgesia.  Pt is ambulating without difficulty, no weakness or paresthesias.  There is no evidence of adverse side effects associated with spinal and nerve block injections.  The patient is receiving adequate incisional pain control at this time and anticipate up to 72 hours of incisional pain control.  However, we further anticipate that the patient will require opioid/nonopioid analgesics for visceral and muscle pain that is not controlled with local anesthetic.      In brief summary, her post-operative analgesia is adequate today. Further  interventional analgesic strategies would be of little utility at this time. Thus, we recommend proceeding with PO analgesics including staggered dosing of NSAIDs (ibuprofen) and acetaminophen, with a taper of oxycodone.     Thank you for including us in the care for this patient.    Kely Mensah MD

## 2020-01-28 NOTE — PLAN OF CARE
Data: Vital signs within normal limits. Postpartum checks within normal limits - see flow record. Patient eating and drinking normally. Patient able to empty bladder independently and is up ambulating. No apparent signs of infection. Incision healing well. Patient performing self cares and is able to care for infant.  Action: Patient medicated during the shift for pain. See MAR. Patient reassessed within 1 hour after each medication and pain was improved - patient stated she was comfortable. Patient education done about self cares and pain management. See flow record.  Response: Positive attachment behaviors observed with infant. Support persons  present.   Plan: Anticipate discharge on 1/29.

## 2020-01-29 VITALS
HEART RATE: 67 BPM | SYSTOLIC BLOOD PRESSURE: 124 MMHG | OXYGEN SATURATION: 99 % | TEMPERATURE: 98.3 F | RESPIRATION RATE: 16 BRPM | DIASTOLIC BLOOD PRESSURE: 82 MMHG

## 2020-01-29 PROCEDURE — 25000132 ZZH RX MED GY IP 250 OP 250 PS 637: Performed by: OBSTETRICS & GYNECOLOGY

## 2020-01-29 PROCEDURE — 25000132 ZZH RX MED GY IP 250 OP 250 PS 637: Performed by: STUDENT IN AN ORGANIZED HEALTH CARE EDUCATION/TRAINING PROGRAM

## 2020-01-29 RX ORDER — BENZOCAINE/MENTHOL 6 MG-10 MG
LOZENGE MUCOUS MEMBRANE 3 TIMES DAILY PRN
Status: DISCONTINUED | OUTPATIENT
Start: 2020-01-29 | End: 2020-01-29 | Stop reason: HOSPADM

## 2020-01-29 RX ADMIN — ACETAMINOPHEN 975 MG: 325 TABLET, FILM COATED ORAL at 04:34

## 2020-01-29 RX ADMIN — IBUPROFEN 800 MG: 800 TABLET, FILM COATED ORAL at 10:36

## 2020-01-29 RX ADMIN — ACETAMINOPHEN 975 MG: 325 TABLET, FILM COATED ORAL at 12:45

## 2020-01-29 RX ADMIN — HYDROCORTISONE: 1 CREAM TOPICAL at 09:25

## 2020-01-29 RX ADMIN — IBUPROFEN 800 MG: 800 TABLET, FILM COATED ORAL at 04:34

## 2020-01-29 NOTE — PLAN OF CARE
VSS and postpartum assessments WDL.  Up ad ana cristina with steady gait.  Independent with cares.  Bonding well with infant.  Breastfeeding on cue independently.  Pain managed with tylenol and ibuprofen.  Partner present and supportive.  Will continue with postpartum cares and education per plan of care.

## 2020-01-29 NOTE — PROGRESS NOTES
[facility]   Post-partum Note    Name:  Ramya BALBUENA  MRN: [id number]    S: Patient is doing well.  Pain is well controlled.  Tolerating regular diet without nausea or vomiting.  Ambulating without dizziness.  Lochia is minimal.  Voiding.  Passing flatus, had a BM.  Breast feeding.      O:   Patient Vitals for the past 24 hrs:   BP Temp Temp src Pulse Heart Rate Resp   20 0000 125/77 98.4  F (36.9  C) Oral -- 88 16   20 1641 110/53 98.3  F (36.8  C) Oral -- 71 16   20 0755 103/57 98.4  F (36.9  C) Oral 67 -- 18     Gen:  Resting comfortably, NAD  CV:  Regular rate  Pulm:  Unlabored breathing  Abd:  Diffuse maculopapular erythematous rash on abdomen. Soft, appropriately ttp, non-distended.Fundus below umbilicus, firm and non-tender.  Ext:  non-tender,  trace LE edema b/l    I/O last 3 completed shifts:  In: -   Out: 600 [Urine:600]    Hgb:   Hemoglobin   Date Value Ref Range Status   2020 9.4 (L) 11.7 - 15.7 g/dL Final       Assessment/Plan:  39 year old  on POD#1 s/p RLTCS.  - continue with routine postpartum management  Pain: Well-controlled with Toradol and tylenol, prn oxycodone. Transition to ibuprofen today.   Hgb: 11.2>> 9.4, home with PO iron  Rh: Neg, baby Rh positive - Rhogam ordered  Rubella: imm  Feed: breast  BC: Partner vasectomy  Dispo: DC POD#2, today    Amy Schumer, MD  Ob/Gyn, PGY-3  2020    Physician Attestation   I, Yadi Ricci MD, personally examined and evaluated this patient.  I discussed the patient with the resident/fellow and care team, and agree with the assessment and plan of care as documented in the note of 20.      I personally reviewed vital signs, medications, labs and exam.    Key findings: 39 year old  on POD 2 s/p RLTCS, doing well. Rash on abdomen likely from prep. Will try hydrocortisone cream and ice packs. Discussed she can take benadryl if needed. Otherwise no complaints.  interested in discharge to home this afternoon. Reviewed discharge instructions including activity restrictions, pelvic rest and follow up plan. Reviewed signs of excessive bleeding, infection, postpartum pre-eclampsia, mastitis, DVT and postpartum depression - instructed patient to call if concerned about any of these or other concerns. Patient to follow up in 6 weeks for routine postpartum visit, planning vasectomy for contraception. All questions answered.    Yadi Ricci MD  Date of Service (when I saw the patient): 01/29/20

## 2020-01-29 NOTE — DISCHARGE SUMMARY
Pt discharged to home with baby. Discharge instructions and medications reviewed. Gift given. Bands double checked. Pt had no questions or concerns at time of discharge.

## 2020-01-29 NOTE — PLAN OF CARE
Patient vital signs stable. Postpartum assessment within normal limits. Pain managed with tylenol and ibuprofen. Patient eating and drinking normally. Patient able to empty bladder independently and is up ambulating. Breastfeeding well. Continue with plan of care

## 2020-01-31 ENCOUNTER — DOCUMENTATION ONLY (OUTPATIENT)
Dept: OBGYN | Facility: CLINIC | Age: 40
End: 2020-01-31

## 2020-01-31 NOTE — PROGRESS NOTES
[facility] will be sharing updates with you on Maternal Child Health Referral requests for home care services.  This is for care coordination purposes and alert you to referral status.  We received the referral for  Ramya BALBUENA; MRN [id number] and want to update you:    [facility] is unable to see patient for postpartum/  assessment and education due to patient insurance not contracted with [facility] for this service. [employer].  Patient advised to contact their insurance provider to determine if service is covered through another homecare agency. Offered option of private pay nurse assessment and education for mom or baby at service rate of 150.00 per visit or 180.00 for both.  Provided call back information if private pay visit is requested.  LEFT VOICEMAIL AND TEXT.    Sincerely [facility]  Luis Grady  [phone]

## 2020-02-20 ENCOUNTER — MEDICAL CORRESPONDENCE (OUTPATIENT)
Dept: HEALTH INFORMATION MANAGEMENT | Facility: CLINIC | Age: 40
End: 2020-02-20

## 2020-04-01 ENCOUNTER — MEDICAL CORRESPONDENCE (OUTPATIENT)
Dept: HEALTH INFORMATION MANAGEMENT | Facility: CLINIC | Age: 40
End: 2020-04-01

## 2020-04-01 ENCOUNTER — PRENATAL OFFICE VISIT (OUTPATIENT)
Dept: OBGYN | Facility: CLINIC | Age: 40
End: 2020-04-01
Payer: COMMERCIAL

## 2020-04-01 PROCEDURE — 99207 ZZC POST PARTUM EXAM: CPT | Performed by: OBSTETRICS & GYNECOLOGY

## 2020-04-01 ASSESSMENT — PATIENT HEALTH QUESTIONNAIRE - PHQ9: SUM OF ALL RESPONSES TO PHQ QUESTIONS 1-9: 0

## 2020-04-01 NOTE — PROGRESS NOTES
"Ramya BALBUENA is a 39 year old female who is being evaluated via a billable telephone visit.      The patient has been notified of following:     \"This telephone visit will be conducted via a call between you and your physician/provider. We have found that certain health care needs can be provided without the need for a physical exam.  This service lets us provide the care you need with a short phone conversation.  If a prescription is necessary we can send it directly to your pharmacy.  If lab work is needed we can place an order for that and you can then stop by our lab to have the test done at a later time.    If during the course of the call the physician/provider feels a telephone visit is not appropriate, you will not be charged for this service.\"     Patient has given verbal consent for Telephone visit?  Yes    Ramya BALBUENA complains of    Chief Complaint   Patient presents with     Post Partum Exam     none       I have reviewed and updated the patient's Past Medical History, Social History, Family History and Medication List.    ALLERGIES  Cats; Pollen extract; and Codeine    Additional provider notes:   OB GYN PHONE VISIT  2020    CC: postpartum visit    SUBJECTIVE:  Ramya BALBUENA is a 39 year old female  called on the phone today for a postpartum visit.  She had a repeat  Section  on 20 delivering a healthy baby boy weighing 8 lbs at 39w3d.      PHQ-9 SCORE 10/10/2014 2020   PHQ-9 Total Score 7 -   PHQ-9 Total Score - 0     No flowsheet data found.    delivery complications:  No  breast feeding:  Yes, going well  bladder problems:  No  bowel problems/hemorrhoids:  No  episiotomy/laceration/incision healed? Yes  vaginal flow:  None  [place]:  No  contraception:  vasectomy  emotional adjustment:  doing well, happy and tired  back to work:  Works as , goes back in     Current Outpatient Medications   Medication     ferrous gluconate (FERGON) " 324 (38 Fe) MG tablet     ibuprofen (ADVIL/MOTRIN) 600 MG tablet     order for DME     Prenatal Multivit-Min-Fe-FA (PRENATAL VITAMINS PO)     SENNA-docusate sodium (SENNA S) 8.6-50 MG tablet     No current facility-administered medications for this visit.      Facility-Administered Medications Ordered in Other Visits   Medication     rho(D) immune globulin (HYPERRHO/RHOGAM) injection 300 mcg     rho(D) immune globulin (HYPERRHO/RHOGAM) injection 300 mcg       OBJECTIVE:  Last menstrual period 2019, unknown if currently breastfeeding.     ASSESSMENT:  39 year old  with normal postpartum exam    PLAN:  Discussed kegel exercises   May resume normal activities without restrictions  Pap smear was not  done today, due  for repeat    The patient will use condoms, vasectomy for birth control. Full counseling was provided, and all questions answered. Compliance is strongly emphasized.  Return to clinic in one year for an annual, when due for a pap smear or PRN.    Phone call duration: 10 minutes    Yadi Ricci MD

## 2020-05-27 ENCOUNTER — TELEPHONE (OUTPATIENT)
Dept: FAMILY MEDICINE | Facility: CLINIC | Age: 40
End: 2020-05-27

## 2020-05-27 NOTE — TELEPHONE ENCOUNTER
"Reason for call:  Patient reporting a symptom   There is a C2C on file for spouse Aditya Dale    Symptom or request:  Spouse Aditya is calling, ,he reports the patient had a fever last night. It was 101.5 she felt \"soupy\", went to bed.  Woke up about midnight, no fever.and this morning has no fever or other symptoms.    Duration (how long have symptoms been present): last night    Have you been treated for this before? No    Additional comments: spouseAditya calling, he is wanting opinion on \"random\" fever of patient last night,  They have a  and other little kids.      Phone Number patient can be reached at: [phone]    Best Time:  any    Can we leave a detailed message on this number:  YES    Call taken on 2020 at 7:25 AM by Leslie Nj    "

## 2020-05-27 NOTE — TELEPHONE ENCOUNTER
"Called and spoke with  and wife (consent to communicate with ).    Last evening around 8:00 pm patient stated she felt \"soupy\" and little lightheaded.    Took Ibuprofen, drank Powerade and went to lie down.  Up at midnight temp down to 98.    Temp running today has been  99.5 - 99.6  States had what she thinks was sinus headache and runny nose this weekend while walking in a park.  Symptoms gone now.  Breast feeding - States she has had mastitis in the past but nothing noted at this time. \"One breast seems to be a slightly harder then the other after feeding but I have not pain.  Have had mastitis before and it was terrible with shooting pain\"    Denies dyspnea, cough, chest pain,diarrhea.  Eating and drinking as usual.  Denies any recent travel, has been social distancing    Advised patient to watch for now and push fluids, Ibuprofen/Tylenol as needed, good handwashing, avoid touching face.   Call if temp goes up again and or if still has temp tomorrow, develops any shortness of breath, cough, body aches, headaches etc.    Patient verbalizes understanding and states she is comfortable with plan    Linda Valerio RN          "

## 2020-06-01 ENCOUNTER — MEDICAL CORRESPONDENCE (OUTPATIENT)
Dept: HEALTH INFORMATION MANAGEMENT | Facility: CLINIC | Age: 40
End: 2020-06-01

## 2020-06-05 ENCOUNTER — VIRTUAL VISIT (OUTPATIENT)
Dept: FAMILY MEDICINE | Facility: CLINIC | Age: 40
End: 2020-06-05
Payer: COMMERCIAL

## 2020-06-05 DIAGNOSIS — N61.0 MASTITIS: Primary | ICD-10-CM

## 2020-06-05 PROCEDURE — 99213 OFFICE O/P EST LOW 20 MIN: CPT | Mod: TEL | Performed by: FAMILY MEDICINE

## 2020-06-05 RX ORDER — DICLOXACILLIN SODIUM 500 MG
500 CAPSULE ORAL 4 TIMES DAILY
Qty: 40 CAPSULE | Refills: 0 | Status: SHIPPED | OUTPATIENT
Start: 2020-06-05 | End: 2020-06-15

## 2020-06-05 NOTE — PROGRESS NOTES
"Ramya BALBUENA is a 39 year old female who is being evaluated via a billable telephone visit.      The patient has been notified of following:     \"This telephone visit will be conducted via a call between you and your physician/provider. We have found that certain health care needs can be provided without the need for a physical exam.  This service lets us provide the care you need with a short phone conversation.  If a prescription is necessary we can send it directly to your pharmacy.  If lab work is needed we can place an order for that and you can then stop by our lab to have the test done at a later time.    Telephone visits are billed at different rates depending on your insurance coverage. During this emergency period, for some insurers they may be billed the same as an in-person visit.  Please reach out to your insurance provider with any questions.    If during the course of the call the physician/provider feels a telephone visit is not appropriate, you will not be charged for this service.\"    Patient has given verbal consent for Telephone visit?  Yes    What phone number would you like to be contacted at? [phone]    How would you like to obtain your AVS? MyChart    Subjective     Ramya BALBUENA is a 39 year old female who presents via phone visit today for the following health issues:    HPI  Mastitis      Duration: last night    Description (location/character/radiation): left breast    Intensity:  moderate    Accompanying signs and symptoms:     History (similar episodes/previous evaluation):     Precipitating or alleviating factors:     Therapies tried and outcome:        left breast - was feeling tender   Had fever 101F  Went away over night   Today has red area underside of breast    Last week - had fever but not a lot of tenderness  Some mild    -------------------------------------    Patient Active Problem List   Diagnosis     BMI >30     Supervision of high-risk pregnancy of elderly " multigravida     Encounter for supervision of multigravida of advanced maternal age in third trimester     History of  section complicating pregnancy     Status post  section     Past Surgical History:   Procedure Laterality Date      SECTION N/A 2014    Procedure:  SECTION;  Surgeon: Maribel Nixon MD;  [facility]      SECTION N/A 2017    Procedure:  SECTION;  Surgeon: Diane Anderson MD;  Location: [facility]      SECTION N/A 2020    Procedure: REPEAT  SECTION;  Surgeon: Yadi Ricci MD;  Location: [facility]     HC EXC LESION OF TONGUE W/O CLOSURE      benign       Social History     Tobacco Use     Smoking status: Never Smoker     Smokeless tobacco: Never Used   Substance Use Topics     Alcohol use: Not Currently     Alcohol/week: 0.0 standard drinks     Frequency: Never     Comment: socially/not  now      Family History   Problem Relation Age of Onset     Heart Disease Paternal Grandfather      Heart Disease Maternal Grandfather      Allergies Mother      Eye Disorder Mother      Allergies Maternal Grandmother      Cerebrovascular Disease Maternal Grandmother 92     Depression Maternal Uncle            Reviewed and updated as needed this visit by Provider         Review of Systems   Constitutional, HEENT, cardiovascular, pulmonary, gi and gu systems are negative, except as otherwise noted.       Objective   Reported vitals:  There were no vitals taken for this visit.   healthy, alert and no distress  PSYCH: Alert and oriented times 3; coherent speech, normal   rate and volume, able to articulate logical thoughts, able   to abstract reason, no tangential thoughts, no hallucinations   or delusions  Her affect is normal  RESP: No cough, no audible wheezing, able to talk in full sentences  Remainder of exam unable to be completed due to telephone visits    Diagnostic Test Results:  Labs reviewed in Epic         Assessment/Plan:  1. Mastitis  Mastitis -   Had symptoms last week and improved but over past 24 hours worsening  Will treat with dicloxacillin QID for 10 days - may stop at 7 days if symptoms are completely resolved after 48 hours.  Pt will call or RTC if symptoms worsen or do not improve.   - dicloxacillin (DYNAPEN) 500 MG capsule; Take 1 capsule (500 mg) by mouth 4 times daily for 10 days  Dispense: 40 capsule; Refill: 0    No follow-ups on file.      Phone call duration:  5 minutes    Naida Elliott, DO

## 2020-06-05 NOTE — NURSING NOTE
"Chief Complaint   Patient presents with     Infection     mastitis left side     initial There were no vitals taken for this visit. Estimated body mass index is 44.4 kg/m  as calculated from the following:    Height as of 12/20/19: 1.549 m (5' 1\").    Weight as of 1/23/20: 106.6 kg (235 lb).  BP completed using cuff size: regular.  L  R arm      Health Maintenance that is potentially due pending provider review:  NONE    n/a    Eric Stoll ma  "

## 2020-08-04 ENCOUNTER — MEDICAL CORRESPONDENCE (OUTPATIENT)
Dept: HEALTH INFORMATION MANAGEMENT | Facility: CLINIC | Age: 40
End: 2020-08-04

## 2021-03-19 ENCOUNTER — OFFICE VISIT (OUTPATIENT)
Dept: FAMILY MEDICINE | Facility: CLINIC | Age: 41
End: 2021-03-19
Payer: COMMERCIAL

## 2021-03-19 VITALS
BODY MASS INDEX: 41.14 KG/M2 | SYSTOLIC BLOOD PRESSURE: 142 MMHG | HEIGHT: 61 IN | RESPIRATION RATE: 16 BRPM | WEIGHT: 217.9 LBS | HEART RATE: 65 BPM | OXYGEN SATURATION: 100 % | TEMPERATURE: 98.6 F | DIASTOLIC BLOOD PRESSURE: 75 MMHG

## 2021-03-19 DIAGNOSIS — D17.30 LIPOMA OF SKIN AND SUBCUTANEOUS TISSUE: Primary | ICD-10-CM

## 2021-03-19 PROCEDURE — 99213 OFFICE O/P EST LOW 20 MIN: CPT | Performed by: FAMILY MEDICINE

## 2021-03-19 ASSESSMENT — MIFFLIN-ST. JEOR: SCORE: 1605.77

## 2021-03-19 NOTE — PROGRESS NOTES
"    Assessment & Plan     Lipoma of skin and subcutaneous tissue  Two lesions just in the left side rib area - lower than the breast  Suspect these are lipomas but concern due to location -   Will check ultrasound of the area to r/o masses or other issue  Pt will call or RTC if symptoms worsen or do not improve.   - US Axillary Left; Future             BMI:   Estimated body mass index is 41.17 kg/m  as calculated from the following:    Height as of this encounter: 1.549 m (5' 1\").    Weight as of this encounter: 98.8 kg (217 lb 14.4 oz).           No follow-ups on file.    Naida Elliott DO  [facility]    Zay Bui is a 40 year old who presents for the following health issues    HPI     Concern - LUMP ON LEFT RIBCAGE  Onset: noticed lump on left side of ribcage  Description: two palpable lumps she noticed after loosing some weight   Intensity: mild  Progression of Symptoms:  same and if not different - first noticed one now another   Accompanying Signs & Symptoms: none   Previous history of similar problem: no  Precipitating factors:        Worsened by: ?  Alleviating factors:        Improved by:    Therapies tried and outcome:  none     Got COVID vaccine - Moderna on the left side         Review of Systems   Constitutional, HEENT, cardiovascular, pulmonary, GI, , musculoskeletal, neuro, skin, endocrine and psych systems are negative, except as otherwise noted.      Objective    BP (!) 142/75   Pulse 65   Temp 98.6  F (37  C) (Tympanic)   Resp 16   Ht 1.549 m (5' 1\")   Wt 98.8 kg (217 lb 14.4 oz)   SpO2 100%   Breastfeeding Yes   BMI 41.17 kg/m    Body mass index is 41.17 kg/m .  Physical Exam   GENERAL: alert, no distress and obese  RESP: lungs clear to auscultation - no rales, rhonchi or wheezes  BREAST: normal without masses, tenderness or nipple discharge and no palpable axillary masses or adenopathy  BREAST: normal without masses, tenderness or nipple discharge and "   Along the outer left rib cage she has two palpable lesions  One inferior lesion is 5-6 cm oval lesion in the subcutaneous tissue -   Second lesion is higher and smaller approx 2-3cm

## 2021-03-26 ENCOUNTER — HOSPITAL ENCOUNTER (OUTPATIENT)
Dept: MAMMOGRAPHY | Facility: CLINIC | Age: 41
End: 2021-03-26
Attending: FAMILY MEDICINE
Payer: COMMERCIAL

## 2021-03-26 DIAGNOSIS — D17.30 LIPOMA OF SKIN AND SUBCUTANEOUS TISSUE: ICD-10-CM

## 2021-03-26 PROCEDURE — G0279 TOMOSYNTHESIS, MAMMO: HCPCS

## 2021-03-26 PROCEDURE — 76882 US LMTD JT/FCL EVL NVASC XTR: CPT | Mod: LT

## 2021-03-26 NOTE — RESULT ENCOUNTER NOTE
Dear Ramya,   Your test results are all back -   Great news the areas are just benign lipomas - nothing to be concerned with.  Let us know if you have any questions.  -Naida Elliott, DO

## 2021-05-15 ENCOUNTER — HEALTH MAINTENANCE LETTER (OUTPATIENT)
Age: 41
End: 2021-05-15

## 2021-06-16 NOTE — NURSING NOTE
"Chief Complaint   Patient presents with     Breast Pain     rt breast pain,currently nursing,sx started last night       Initial /80 (BP Location: Right arm, Patient Position: Chair, Cuff Size: Adult Regular)  Pulse 104  Temp 102.4  F (39.1  C) (Oral)  Wt 201 lb (91.2 kg)  SpO2 100%  BMI 37.98 kg/m2 Estimated body mass index is 37.98 kg/(m^2) as calculated from the following:    Height as of 3/29/17: 5' 1\" (1.549 m).    Weight as of this encounter: 201 lb (91.2 kg).  Medication Reconciliation: complete Ac MELVIN    "
Nurse present during breast exam.  
intact
impaired

## 2021-09-04 ENCOUNTER — HEALTH MAINTENANCE LETTER (OUTPATIENT)
Age: 41
End: 2021-09-04

## 2021-09-20 ENCOUNTER — TRANSFERRED RECORDS (OUTPATIENT)
Dept: HEALTH INFORMATION MANAGEMENT | Facility: CLINIC | Age: 41
End: 2021-09-20

## 2021-10-07 NOTE — PROGRESS NOTES
SUBJECTIVE:   CC: Ramya BALBUENA is an 41 year old woman who presents for preventive health visit.       Patient has been advised of split billing requirements and indicates understanding: Yes  Healthy Habits:     Getting at least 3 servings of Calcium per day:  Yes    Bi-annual eye exam:  Yes    Dental care twice a year:  NO    Sleep apnea or symptoms of sleep apnea:  Daytime drowsiness    Diet:  Regular (no restrictions)    Frequency of exercise:  4-5 days/week    Duration of exercise:  15-30 minutes    Taking medications regularly:  Yes    PHQ-2 Total Score: 2    Additional concerns today:  No    -------------------------------------    Today's PHQ-2 Score:   PHQ-2 ( 1999 Pfizer) 10/8/2021   Q1: Little interest or pleasure in doing things 1   Q2: Feeling down, depressed or hopeless 1   PHQ-2 Score 2   Q1: Little interest or pleasure in doing things Several days   Q2: Feeling down, depressed or hopeless Several days   PHQ-2 Score 2       Abuse: Current or Past (Physical, Sexual or Emotional) - No  Do you feel safe in your environment? Yes    Have you ever done Advance Care Planning? (For example, a Health Directive, POLST, or a discussion with a medical provider or your loved ones about your wishes): No, advance care planning information given to patient to review.  Patient declined advance care planning discussion at this time.    Social History     Tobacco Use     Smoking status: Never Smoker     Smokeless tobacco: Never Used   Substance Use Topics     Alcohol use: Yes     Alcohol/week: 0.0 standard drinks     Comment: 1 time or less per week     If you drink alcohol do you typically have >3 drinks per day or >7 drinks per week? No    Alcohol Use 10/8/2021   Prescreen: >3 drinks/day or >7 drinks/week? No   Prescreen: >3 drinks/day or >7 drinks/week? -       Reviewed orders with patient.  Reviewed health maintenance and updated orders accordingly - Yes  Patient Active Problem List   Diagnosis     BMI >30      History of  section complicating pregnancy     Morbid obesity (H)     Past Surgical History:   Procedure Laterality Date      SECTION N/A 2014    Procedure:  SECTION;  Surgeon: Maribel Nixon MD;  [facility]      SECTION N/A 2017    Procedure:  SECTION;  Surgeon: Diane Anderson MD;  [facility]      SECTION N/A 2020    Procedure: REPEAT  SECTION;  Surgeon: Yadi Ricci MD;  [facility]     HC EXC LESION OF TONGUE W/O CLOSURE      benign       Social History     Tobacco Use     Smoking status: Never Smoker     Smokeless tobacco: Never Used   Substance Use Topics     Alcohol use: Yes     Alcohol/week: 0.0 standard drinks     Comment: 1 time or less per week     Family History   Problem Relation Age of Onset     Heart Disease Paternal Grandfather      Heart Disease Maternal Grandfather      Allergies Mother      Eye Disorder Mother      Allergies Maternal Grandmother      Cerebrovascular Disease Maternal Grandmother 92     Depression Maternal Uncle            Breast Cancer Screening:  Any new diagnosis of family breast, ovarian, or bowel cancer?     FHS-7: No flowsheet data found.    Patient under 40 years of age: Routine Mammogram Screening not recommended.   Pertinent mammograms are reviewed under the imaging tab.    History of abnormal Pap smear: NO - age 30-65 PAP every 5 years with negative HPV co-testing recommended  PAP / HPV Latest Ref Rng & Units 2019   PAP (Historical) - NIL NIL NIL   HPV16 NEG:Negative Negative Negative -   HPV18 NEG:Negative Negative Negative -   HRHPV NEG:Negative Negative Negative -     Reviewed and updated as needed this visit by clinical staff  Tobacco  Allergies       Soc Hx        Reviewed and updated as needed this visit by Provider                    Review of Systems   Constitutional: Negative for chills and fever.   HENT: Negative for  "congestion, ear pain, hearing loss and sore throat.    Eyes: Negative for pain and visual disturbance.   Respiratory: Negative for cough and shortness of breath.    Cardiovascular: Negative for chest pain, palpitations and peripheral edema.   Gastrointestinal: Negative for abdominal pain, constipation, diarrhea, heartburn, hematochezia and nausea.   Genitourinary: Negative for dysuria, frequency, genital sores, hematuria and urgency.   Musculoskeletal: Negative for arthralgias, joint swelling and myalgias.   Skin: Negative for rash.   Neurological: Negative for dizziness, weakness, headaches and paresthesias.   Psychiatric/Behavioral: Negative for mood changes. The patient is not nervous/anxious.           OBJECTIVE:   /77 (BP Location: Right arm, Patient Position: Sitting, Cuff Size: Adult Large)   Pulse 73   Temp 98.1  F (36.7  C) (Oral)   Resp 16   Ht 1.549 m (5' 1\")   Wt 103.4 kg (228 lb)   SpO2 99%   BMI 43.08 kg/m    Physical Exam  GENERAL: alert and no distress  EYES: Eyes grossly normal to inspection, PERRL and conjunctivae and sclerae normal  HENT: ear canals and TM's normal,NECK: no adenopathy, no asymmetry, masses, or scars and thyroid normal to palpation  RESP: lungs clear to auscultation - no rales, rhonchi or wheezes  BREAST: normal without masses, tenderness or nipple discharge and no palpable axillary masses or adenopathy  CV: regular rate and rhythm, normal S1 S2, no S3 or S4, no murmur, click or rub, no peripheral edema and peripheral pulses strong  ABDOMEN: soft, nontender, no hepatosplenomegaly, no masses and bowel sounds normal  MS: no gross musculoskeletal defects noted, no edema  SKIN: no suspicious lesions or rashes  NEURO: Normal strength and tone, mentation intact and speech normal  PSYCH: mentation appears normal, affect normal/bright    Diagnostic Test Results:  Labs reviewed in Epic  Results for orders placed or performed in visit on 10/08/21 (from the past 24 hour(s))   CBC " "with platelets   Result Value Ref Range    WBC Count 9.9 4.0 - 11.0 10e3/uL    RBC Count 4.58 3.80 - 5.20 10e6/uL    Hemoglobin 12.8 11.7 - 15.7 g/dL    Hematocrit 39.7 35.0 - 47.0 %    MCV 87 78 - 100 fL    MCH 27.9 26.5 - 33.0 pg    MCHC 32.2 31.5 - 36.5 g/dL    RDW 13.7 10.0 - 15.0 %    Platelet Count 298 150 - 450 10e3/uL       ASSESSMENT/PLAN:   (Z00.00) Routine general medical examination at a health care facility  (primary encounter diagnosis)  Comment:    Plan: Lipid panel reflex to direct LDL Non-fasting,         Comprehensive metabolic panel (BMP + Alb, Alk         Phos, ALT, AST, Total. Bili, TP), CBC with         platelets, Hepatitis C Screen Reflex to HCV RNA        Quant and Genotype             (E66.01) BMI 43  Comment:    Plan:      Patient has been advised of split billing requirements and indicates understanding: Yes  COUNSELING:  Reviewed preventive health counseling, as reflected in patient instructions    Estimated body mass index is 43.08 kg/m  as calculated from the following:    Height as of this encounter: 1.549 m (5' 1\").    Weight as of this encounter: 103.4 kg (228 lb).    Weight management plan: Discussed healthy diet and exercise guidelines    She reports that she has never smoked. She has never used smokeless tobacco.      Counseling Resources:  ATP IV Guidelines  Pooled Cohorts Equation Calculator  Breast Cancer Risk Calculator  BRCA-Related Cancer Risk Assessment: FHS-7 Tool  FRAX Risk Assessment  ICSI Preventive Guidelines  Dietary Guidelines for Americans, 2010  USDA's MyPlate  ASA Prophylaxis  Lung CA Screening    Naida Elliott, DO  [facility]  "

## 2021-10-08 ENCOUNTER — OFFICE VISIT (OUTPATIENT)
Dept: FAMILY MEDICINE | Facility: CLINIC | Age: 41
End: 2021-10-08
Payer: COMMERCIAL

## 2021-10-08 VITALS
OXYGEN SATURATION: 99 % | DIASTOLIC BLOOD PRESSURE: 77 MMHG | WEIGHT: 228 LBS | HEART RATE: 73 BPM | SYSTOLIC BLOOD PRESSURE: 125 MMHG | RESPIRATION RATE: 16 BRPM | BODY MASS INDEX: 43.05 KG/M2 | TEMPERATURE: 98.1 F | HEIGHT: 61 IN

## 2021-10-08 DIAGNOSIS — E66.01 MORBID OBESITY (H): ICD-10-CM

## 2021-10-08 DIAGNOSIS — Z00.00 ROUTINE GENERAL MEDICAL EXAMINATION AT A HEALTH CARE FACILITY: Primary | ICD-10-CM

## 2021-10-08 PROBLEM — O09.523 ENCOUNTER FOR SUPERVISION OF MULTIGRAVIDA OF ADVANCED MATERNAL AGE IN THIRD TRIMESTER: Status: RESOLVED | Noted: 2019-11-21 | Resolved: 2021-10-08

## 2021-10-08 PROBLEM — Z98.891 STATUS POST CESAREAN SECTION: Status: RESOLVED | Noted: 2020-01-27 | Resolved: 2021-10-08

## 2021-10-08 PROBLEM — O09.529 SUPERVISION OF HIGH-RISK PREGNANCY OF ELDERLY MULTIGRAVIDA: Status: RESOLVED | Noted: 2019-07-30 | Resolved: 2021-10-08

## 2021-10-08 LAB
ERYTHROCYTE [DISTWIDTH] IN BLOOD BY AUTOMATED COUNT: 13.7 % (ref 10–15)
HCT VFR BLD AUTO: 39.7 % (ref 35–47)
HGB BLD-MCNC: 12.8 G/DL (ref 11.7–15.7)
MCH RBC QN AUTO: 27.9 PG (ref 26.5–33)
MCHC RBC AUTO-ENTMCNC: 32.2 G/DL (ref 31.5–36.5)
MCV RBC AUTO: 87 FL (ref 78–100)
PLATELET # BLD AUTO: 298 10E3/UL (ref 150–450)
RBC # BLD AUTO: 4.58 10E6/UL (ref 3.8–5.2)
WBC # BLD AUTO: 9.9 10E3/UL (ref 4–11)

## 2021-10-08 PROCEDURE — 80053 COMPREHEN METABOLIC PANEL: CPT | Performed by: FAMILY MEDICINE

## 2021-10-08 PROCEDURE — 36415 COLL VENOUS BLD VENIPUNCTURE: CPT | Performed by: FAMILY MEDICINE

## 2021-10-08 PROCEDURE — 85027 COMPLETE CBC AUTOMATED: CPT | Performed by: FAMILY MEDICINE

## 2021-10-08 PROCEDURE — 99395 PREV VISIT EST AGE 18-39: CPT | Mod: 25 | Performed by: FAMILY MEDICINE

## 2021-10-08 PROCEDURE — 90471 IMMUNIZATION ADMIN: CPT | Performed by: FAMILY MEDICINE

## 2021-10-08 PROCEDURE — 80061 LIPID PANEL: CPT | Performed by: FAMILY MEDICINE

## 2021-10-08 PROCEDURE — 90686 IIV4 VACC NO PRSV 0.5 ML IM: CPT | Performed by: FAMILY MEDICINE

## 2021-10-08 PROCEDURE — 86803 HEPATITIS C AB TEST: CPT | Performed by: FAMILY MEDICINE

## 2021-10-08 ASSESSMENT — ENCOUNTER SYMPTOMS
CONSTIPATION: 0
FREQUENCY: 0
HEMATOCHEZIA: 0
HEARTBURN: 0
MYALGIAS: 0
ABDOMINAL PAIN: 0
SORE THROAT: 0
FEVER: 0
HEMATURIA: 0
NAUSEA: 0
ARTHRALGIAS: 0
DIZZINESS: 0
HEADACHES: 0
EYE PAIN: 0
CHILLS: 0
DIARRHEA: 0
SHORTNESS OF BREATH: 0
WEAKNESS: 0
NERVOUS/ANXIOUS: 0
COUGH: 0
PALPITATIONS: 0
JOINT SWELLING: 0
DYSURIA: 0
PARESTHESIAS: 0

## 2021-10-08 ASSESSMENT — MIFFLIN-ST. JEOR: SCORE: 1646.58

## 2021-10-09 LAB
ALBUMIN SERPL-MCNC: 3.7 G/DL (ref 3.4–5)
ALP SERPL-CCNC: 63 U/L (ref 40–150)
ALT SERPL W P-5'-P-CCNC: 23 U/L (ref 0–50)
ANION GAP SERPL CALCULATED.3IONS-SCNC: 7 MMOL/L (ref 3–14)
AST SERPL W P-5'-P-CCNC: 14 U/L (ref 0–45)
BILIRUB SERPL-MCNC: 0.3 MG/DL (ref 0.2–1.3)
BUN SERPL-MCNC: 15 MG/DL (ref 7–30)
CALCIUM SERPL-MCNC: 8.9 MG/DL (ref 8.5–10.1)
CHLORIDE BLD-SCNC: 110 MMOL/L (ref 94–109)
CHOLEST SERPL-MCNC: 227 MG/DL
CO2 SERPL-SCNC: 23 MMOL/L (ref 20–32)
CREAT SERPL-MCNC: 0.69 MG/DL (ref 0.52–1.04)
FASTING STATUS PATIENT QL REPORTED: NO
GFR SERPL CREATININE-BSD FRML MDRD: >90 ML/MIN/1.73M2
GLUCOSE BLD-MCNC: 86 MG/DL (ref 70–99)
HCV AB SERPL QL IA: NONREACTIVE
HDLC SERPL-MCNC: 61 MG/DL
LDLC SERPL CALC-MCNC: 127 MG/DL
NONHDLC SERPL-MCNC: 166 MG/DL
POTASSIUM BLD-SCNC: 4.1 MMOL/L (ref 3.4–5.3)
PROT SERPL-MCNC: 7.5 G/DL (ref 6.8–8.8)
SODIUM SERPL-SCNC: 140 MMOL/L (ref 133–144)
TRIGL SERPL-MCNC: 194 MG/DL

## 2021-10-12 NOTE — RESULT ENCOUNTER NOTE
Dear Ramya,   Your test results are all back -   Labs are stable -   Lipids - keep working on diet and exercise.  Kidney, liver and glucose are all normal.  CBC is all normal.   Hep C is negative.  Let us know if you have any questions.  -Naida Elliott, DO

## 2021-10-30 ENCOUNTER — OFFICE VISIT (OUTPATIENT)
Dept: URGENT CARE | Facility: URGENT CARE | Age: 41
End: 2021-10-30
Payer: COMMERCIAL

## 2021-10-30 VITALS
RESPIRATION RATE: 15 BRPM | SYSTOLIC BLOOD PRESSURE: 122 MMHG | HEART RATE: 80 BPM | DIASTOLIC BLOOD PRESSURE: 82 MMHG | BODY MASS INDEX: 43.43 KG/M2 | OXYGEN SATURATION: 99 % | HEIGHT: 61 IN | WEIGHT: 230 LBS | TEMPERATURE: 98.9 F

## 2021-10-30 DIAGNOSIS — S66.209A: ICD-10-CM

## 2021-10-30 DIAGNOSIS — S61.011A THUMB LACERATION, RIGHT, INITIAL ENCOUNTER: Primary | ICD-10-CM

## 2021-10-30 PROCEDURE — 99214 OFFICE O/P EST MOD 30 MIN: CPT | Mod: 25 | Performed by: FAMILY MEDICINE

## 2021-10-30 PROCEDURE — 12001 RPR S/N/AX/GEN/TRNK 2.5CM/<: CPT | Performed by: FAMILY MEDICINE

## 2021-10-30 ASSESSMENT — MIFFLIN-ST. JEOR: SCORE: 1655.65

## 2021-10-30 NOTE — PROGRESS NOTES
Assessment & Plan     Thumb laceration, right, initial encounter  - Wrist/Arm/Hand Supplies Order for DME - ONLY FOR DME  - Orthopedic  Referral    Unspecified injury of extensor muscle, fascia and tendon of unspecified thumb at wrist and hand level, initial encounter  - Wrist/Arm/Hand Supplies Order for DME - ONLY FOR DME  - Orthopedic  Referral     A subtle pop was experienced and patient noted discomfort when we tested extension against resistance with force being applied to the distal aspect of thumb -- concern for tear of extensor tendon. Exam as below.     Discussed case with Dr. Rodriguez of orthopedics who agrees with plan to close wound and splint patient with close f/u with hand specialists early next week to assess integrity of tendon(s).       Area was cleaned with chlorhexidine. 1.5mL of 1%lidocaine used to anesthetize the area. 4 interrupted sutures using 5-0 ethilon was used to close the wound. Bandage applied. Patient given thumb spica splint to protect the area.     See AVS summary for additional recommendations reviewed with patient during this visit.         Dany Quispe MD   [place] [facility]    Zay Bui is a 41 year old female who presents to clinic today for the following health issues:  Chief Complaint   Patient presents with     Urgent Care     Laceration     cut to right thumb about 4:15 today carving pumpkin     HPI  Carving pumpkins and cut the dorsal side of thumb. Occurred about 30 minutes before arriving to urgent care. Bleeding has been under control with rag and gauze provided in clinic. She has no numbness of the digit    Patient Active Problem List    Diagnosis Date Noted     Morbid obesity (H) 10/08/2021     Priority: Medium     History of  section complicating pregnancy 2019     Priority: Medium     Added automatically from request for surgery [id number]       BMI >30 2016     Priority: Medium       No current outpatient  "medications on file.     No current facility-administered medications for this visit.         Objective    /82   Pulse 80   Temp 98.9  F (37.2  C) (Oral)   Resp 15   Ht 1.549 m (5' 1\")   Wt 104.3 kg (230 lb)   LMP 10/16/2021   SpO2 99%   Breastfeeding Yes   BMI 43.46 kg/m    Physical Exam     R thumb: half inch horizontal laceration below the IP joint, intact flexion, there is weakness with resisted thumb extension. No numbness of the digit        No results found for any visits on 10/30/21.            The use of Dragon/eBooks in Motion dictation services may have been used to construct the content in this note; any grammatical or spelling errors are non-intentional. Please contact the author of this note directly if you are in need of any clarification.   "

## 2021-10-30 NOTE — PATIENT INSTRUCTIONS
You will need to be seen on Monday with the orthopedic hand specialists call [phone] to have this urgent appointment       Keep your hand in the thumb spica splint when not bathing/showering      Change the bandage once daily      Unless directed by the surgery team the sutures will need to come out in about 1 week (call ahead to our clinic for a nurse only visit at your convenience to get this done)

## 2021-11-01 ENCOUNTER — TRANSFERRED RECORDS (OUTPATIENT)
Dept: HEALTH INFORMATION MANAGEMENT | Facility: CLINIC | Age: 41
End: 2021-11-01
Payer: COMMERCIAL

## 2022-05-03 ENCOUNTER — OFFICE VISIT (OUTPATIENT)
Dept: URGENT CARE | Facility: URGENT CARE | Age: 42
End: 2022-05-03
Payer: COMMERCIAL

## 2022-05-03 VITALS
DIASTOLIC BLOOD PRESSURE: 82 MMHG | HEART RATE: 69 BPM | SYSTOLIC BLOOD PRESSURE: 124 MMHG | TEMPERATURE: 98.5 F | OXYGEN SATURATION: 100 %

## 2022-05-03 DIAGNOSIS — H69.93 DYSFUNCTION OF BOTH EUSTACHIAN TUBES: Primary | ICD-10-CM

## 2022-05-03 PROCEDURE — 99213 OFFICE O/P EST LOW 20 MIN: CPT | Performed by: FAMILY MEDICINE

## 2022-05-03 NOTE — PROGRESS NOTES
"  Assessment & Plan     Dysfunction of both eustachian tubes  No sign of AOM. Suspect pain and \"dripping\" sensation is from ETD and intermittent mild, clear effusions. Could be caused by seasonal allergies. Recommend trial of flonse, which we discussed is compatible with breastfeeding. Follow up if worsening or not improving.                BMI:   Estimated body mass index is 43.46 kg/m  as calculated from the following:    Height as of 10/30/21: 1.549 m (5' 1\").    Weight as of 10/30/21: 104.3 kg (230 lb).           No follow-ups on file.    Deyanira Garcia MD  [facility] NIKO Bui is a 41 year old who presents for the following health issues   Chief Complaint   Patient presents with     Urgent Care     Poss ear infection ears feel like theres a lot of pressure and feels like something is draining off and on for a few days      HPI   Bilat ear pain/pressure and ?drainage (nothing coming out, but feels like it is \"dripping\" for 4-5 days R>L).    No fever.    No recent/current URI symptoms.    History of seasonal allergies, but typically fall, not spring.      Breast feeding, so has avoided taking OTC meds.         Review of Systems         Objective    /82   Pulse 69   Temp 98.5  F (36.9  C)   SpO2 100%   There is no height or weight on file to calculate BMI.  Physical Exam   GENERAL: healthy, alert and no distress  EYES: Eyes grossly normal to inspection, PERRL and conjunctivae and sclerae normal  HENT: ear canals and TM's normal, other than clear fluid behind TMs and slight retraction; nose and mouth without ulcers or lesions  NECK: no adenopathy, no asymmetry, masses, or scars and thyroid normal to palpation  MS: no gross musculoskeletal defects noted, no edema                "

## 2022-06-29 NOTE — BRIEF OP NOTE
[facility] OB/[facility] Brief Operative Note    Patient Name: Ramya BALBUENA  MRN:[id number]  : 1980  Date of Surgery: 2017   Pre-operative diagnosis:  1. IUP at 39w6d  2. Failed TOLAC   Post-operative diagnosis:  Same, s/p procedure   Procedure:  Repeat low transverse  section   Surgeon:  Dr. Anderson (OB/GYN Attending Staff)    Assistant(s):  Shell Marx MD (OB/GYN Resident, PGY3)     Anesthesia:  Spinal   Estimated blood loss:  800 mL    Total IV fluids:  (See anesthesia record)   700ml crystaloid    Blood transfusion:  No transfusion was given during surgery    Total urine output:  700 mL    Drains:  Brooks   Specimens:  None    Findings:  Vigorous female infant.  Thin meconium.  Minimal abdominal wall adhesions.  See Op Note for further details.   Complications:  None    Condition:  Stable, transferred to PACU    Comments:  See accompanying operative report for full details        Shell Marx MD   OB/GYN Resident PGY3  2017         Surgeon Performing Repair (Optional): Dr. Hannah

## 2022-08-01 ENCOUNTER — VIRTUAL VISIT (OUTPATIENT)
Dept: FAMILY MEDICINE | Facility: CLINIC | Age: 42
End: 2022-08-01
Payer: COMMERCIAL

## 2022-08-01 DIAGNOSIS — U07.1 INFECTION DUE TO 2019 NOVEL CORONAVIRUS: Primary | ICD-10-CM

## 2022-08-01 PROCEDURE — 99213 OFFICE O/P EST LOW 20 MIN: CPT | Mod: CS | Performed by: NURSE PRACTITIONER

## 2022-08-01 RX ORDER — FLUTICASONE PROPIONATE 50 MCG
1 SPRAY, SUSPENSION (ML) NASAL DAILY
COMMUNITY

## 2022-08-01 RX ORDER — LORATADINE 10 MG/1
10 TABLET ORAL DAILY
COMMUNITY

## 2022-08-01 NOTE — PROGRESS NOTES
"Ramya is a 42 year old who is being evaluated via a billable telephone visit.      What phone number would you like to be contacted at? [phone]  How would you like to obtain your AVS? MyChart    Assessment & Plan     Infection due to 2019 novel coronavirus  Vaccinated, boosted. Discussed risk/benefit of treatment options, would like to proceed with Paxlovid. Symptomatic care, quarantine guidance discussed.  - nirmatrelvir and ritonavir (PAXLOVID) therapy pack; Take 3 tablets by mouth 2 times daily for 5 days (Take 2 Nirmatrelvir tablets and 1 Ritonavir tablet twice daily for 5 days)    BMI:   Estimated body mass index is 43.46 kg/m  as calculated from the following:    Height as of 10/30/21: 1.549 m (5' 1\").    Weight as of 10/30/21: 104.3 kg (230 lb).       COVID-19 positive patient.  Encounter for consideration of medication intervention. Patient does qualify for a prescription. Full discussion with patient including medication options, risks and benefits. Potential drug interactions reviewed with patient.     Treatment Planned Paxlovid, Rx sent to [facility] [facility]  Preferred [facility]  Temporary change to home medications:  None     Estimated body mass index is 43.46 kg/m  as calculated from the following:    Height as of 10/30/21: 1.549 m (5' 1\").    Weight as of 10/30/21: 104.3 kg (230 lb).  GFR Estimate   Date Value Ref Range Status   10/08/2021 >90 >60 mL/min/1.73m2 Final     Comment:     As of July 11, 2021, eGFR is calculated by the CKD-EPI creatinine equation, without race adjustment. eGFR can be influenced by muscle mass, exercise, and diet. The reported eGFR is an estimation only and is only applicable if the renal function is stable.     No results found for: [id number]    Return in about 1 week (around 8/8/2022) for worsening or continued symptoms.    CECILIA Lima CNP  [facility]    Subjective   Ramay is a 42 year old, presenting for the following health issues:  No " "chief complaint on file.      HPI       COVID-19 Symptom Review  How many days ago did these symptoms start? 7/30/2022    Are any of the following symptoms significant for you?    New or worsening difficulty breathing? No    Worsening cough? Yes, it's a dry cough.     Fever or chills? Yes, the highest temperature was 100.0    Headache: YES- little bit     Sore throat: YES    Chest pain: No    Diarrhea: No    Body aches? YES    What treatments has patient tried? Acetaminophen   Does patient live in a nursing home, group home, or shelter? No  Does patient have a way to get food/medications during quarantined? Yes, I have a friend or family member who can help me.             MASSBP Score 5/10/2022   What day did symptoms start?  5/9/2022       Estimated body mass index is 43.46 kg/m  as calculated from the following:    Height as of 10/30/21: 1.549 m (5' 1\").    Weight as of 10/30/21: 104.3 kg (230 lb).     GFR Estimate   Date Value Ref Range Status   10/08/2021 >90 >60 mL/min/1.73m2 Final     Comment:     As of July 11, 2021, eGFR is calculated by the CKD-EPI creatinine equation, without race adjustment. eGFR can be influenced by muscle mass, exercise, and diet. The reported eGFR is an estimation only and is only applicable if the renal function is stable.                Review of Systems   Constitutional, HEENT, cardiovascular, pulmonary, gi and gu systems are negative, except as otherwise noted.      Objective           Vitals:  No vitals were obtained today due to virtual visit.    Physical Exam   healthy, alert and no distress  PSYCH: Alert and oriented times 3; coherent speech, normal   rate and volume, able to articulate logical thoughts, able   to abstract reason, no tangential thoughts, no hallucinations   or delusions  Her affect is normal  RESP: No cough, no audible wheezing, able to talk in full sentences  Remainder of exam unable to be completed due to telephone visits                Phone call duration: " 5 minutes    .  ..

## 2022-08-01 NOTE — PATIENT INSTRUCTIONS
"  Instructions for Patients  Your COVID-19 test was positive. This means you have the virus. Please follow the \"How can I take care of myself\" and \"How do I self-isolate?\" guidelines in these instructions.    What treatments are available?  Over-the-counter medicines may help with your symptoms such as runny or stuffy nose, cough, chills, and fever. Talk to your care team about your options.     Some people are at high risk for severe illness (for example if you have a weak immune system, you're 65 or older, or you have certain medical problems). If your risk it high and your symptoms started in the last 5 to 7 days, we strongly recommend for you to get COVID treatment as soon as possible before you get really sick. Paxlovid, Molnupiravir and the monoclonal antibody treatments are proven safe and effective, make you feel better faster, and prevent hospitalization and death.       You can schedule an appointment to discuss COVID treatment by requesting an appointment on [email or web address] by selecting \"schedule COVID-19 Treatment\" or by calling [facility] ([phone]) and pressing 7.    What are the symptoms of COVID-19?  Symptoms can include fever, cough, shortness of breath, chills, headache, muscle pain sore throat, fatigue, runny or stuffy nose, and loss of taste and smell. Other less common symptoms include nausea, vomiting, or diarrhea (watery stools).    Know when to call 911. Emergency warning signs include:    Trouble breathing or shortness of breath    Pain or pressure in the chest that doesn't go away    Feeling confused like you haven't felt before, or not being able to wake up    Bluish-colored lips or face    How can I take care of myself?  1. Get lots of rest. Drink extra fluids (unless a doctor has told you not to).  2. Take Tylenol (acetaminophen) for fever or pain. If you have liver or kidney problems, ask your family doctor if it's okay to take Tylenol   Adults:   650 mg (two 325 mg pills or tablets) " every 4 to 6 hours, or...   1,000 mg (two 500 mg pills or tablets) every 8 hours as needed.  Note: Don't take more than 3,000 mg in one day. Acetaminophen is found in many medicines (both prescribed and over the counter). Read all labels to be sure you don't take too much.  For children, check the Tylenol bottle for the right dose. The dose is based on the child's age or weight.  3. Take over the counter medicines for your symptoms as needed. Talk to your pharmacist.  4. If you have other health problems (like cancer, heart failure, an organ transplant, or severe kidney disease): Call your specialty clinic if you don't feel better in the next 2 days.    These guidelines are for isolating and quarantining before returning to work, school or .     For employers, schools and day cares: This is an official notice for this person s medical guidelines for returning in-person.     For health care sites: The CDC gives different isolation and quarantine guidelines for healthcare sites, please check with these sites before arriving.     How do I self-isolate?  You isolate when you have symptoms of COVID or a test shows you have COVID, even if you don t have symptoms.     If you DO have symptoms:  o Stay home and away from others  - For at least 5 days after your symptoms started, AND   - You are fever free for 24 hours (with no medicine that reduces fever), AND  - Your other symptoms are better.  o Wear a mask for 10 full days any time you are around others.    If you DON T have symptoms:  o Stay at home and away from others for at least 5 days after your positive test.  o Wear a mask for 10 full days any time you are around others.    How and when do I quarantine?  You quarantine when you may have been exposed to the virus and DON T have symptoms.     Stay home and away from others.     You must quarantine for 5 days after your last contact with a person who has COVID.  o Note: If you are fully vaccinated, you don t  need to quarantine. You should still follow the steps below.     Wear a mask for 10 full days any time you re around others.    Get tested at least 5 days after you were exposed, even if you don t have symptoms.     If you start to have symptoms, isolate right away and get tested.    Where can I get more information?    [facility] COVID-19 Resource Hub: [email or web address]     CDC Quarantine & Isolation: https://www.cdc.gov/coronavirus/2019-ncov/your-health/quarantine-isolation.html     CDC - What to Do If You're Sick: https://www.cdc.gov/coronavirus/2019-ncov/if-you-are-sick/index.html    [facility] clinical trials (COVID-19 research studies): [email or web address]/umn-clinical-trials    [place] Department of Health COVID-19 Public Hotline: 1-394.220.3228

## 2022-08-03 ENCOUNTER — NURSE TRIAGE (OUTPATIENT)
Dept: FAMILY MEDICINE | Facility: CLINIC | Age: 42
End: 2022-08-03

## 2022-08-03 NOTE — TELEPHONE ENCOUNTER
"  Additional Information    Negative: SEVERE difficulty breathing (e.g., struggling for each breath, speaks in single words)    Negative: Difficult to awaken or acting confused (e.g., disoriented, slurred speech)    Negative: Bluish (or gray) lips or face now    Negative: Shock suspected (e.g., cold/pale/clammy skin, too weak to stand, low BP, rapid pulse)    Negative: Sounds like a life-threatening emergency to the triager    Negative: [1] Diagnosed or suspected COVID-19 AND [2] symptoms lasting 3 or more weeks    Negative: [1] COVID-19 exposure AND [2] no symptoms    Negative: COVID-19 vaccine reaction suspected (e.g., fever, headache, muscle aches) occurring 1 to 3 days after getting vaccine    Negative: COVID-19 vaccine, questions about    Negative: [1] Lives with someone known to have influenza (flu test positive) AND [2] flu-like symptoms (e.g., cough, runny nose, sore throat, SOB; with or without fever)    Negative: [1] Adult with possible COVID-19 symptoms AND [2] triager concerned about severity of symptoms or other causes    Negative: COVID-19 and breastfeeding, questions about    Negative: SEVERE or constant chest pain or pressure  (Exception: Mild central chest pain, present only when coughing.)    Negative: MODERATE difficulty breathing (e.g., speaks in phrases, SOB even at rest, pulse 100-120)    Negative: Headache and stiff neck (can't touch chin to chest)    Negative: Oxygen level (e.g., pulse oximetry) 90 percent or lower    Negative: Chest pain or pressure    Negative: Patient sounds very sick or weak to the triager    Commented on: All Negative - Go to ED Now     Not applicable since patient does not have an oximeter    Answer Assessment - Initial Assessment Questions  1. MAIN CONCERN OR SYMPTOM:  \"What is your main concern right now?\" \"What question do you have?\" \"What's the main symptom you're worried about?\" (e.g., fever, pain, redness, swelling)      After patient took 2nd dose of Paxlovid " "yesterday, she has had \"a slight annoyance pain\" under her right rib  2. VACCINE: \"What vaccination did you receive?\" (e.g., none; AstraZeneca, J&J, Moderna, Pfizer, other) \"Is this your first, second shot, or booster?\" (e.g., first, second, booster)      *No Answer*  3. SYMPTOM ONSET: \"When did the *No Answer* begin?\" (e.g., not relevant; hours, days)       *No Answer*  4. SYMPTOM SEVERITY: \"How bad is it?\"       *No Answer*  5. FEVER: \"Is there a fever?\" If Yes, ask: \"What is it, how was it measured, and when did it start?\"       *No Answer*  6. PAST REACTIONS: \"Have you reacted to immunizations before?\" If Yes, ask: \"What happened?\"      *No Answer*  7. OTHER SYMPTOMS: \"Do you have any other symptoms?\"      *No Answer*    Answer Assessment - Initial Assessment Questions  1. COVID-19 DIAGNOSIS: \"Who made your COVID-19 diagnosis?\" \"Was it confirmed by a positive lab test or self-test?\" If not diagnosed by a doctor (or NP/PA), ask \"Are there lots of cases (community spread) where you live?\" Note: See public health department website, if unsure.      Didn't ask  2. COVID-19 EXPOSURE: \"Was there any known exposure to COVID before the symptoms began?\" CDC Definition of close contact: within 6 feet (2 meters) for a total of 15 minutes or more over a 24-hour period.       yes  3. ONSET: \"When did the COVID-19 symptoms start?\"       4 days ago  4. WORST SYMPTOM: \"What is your worst symptom?\" (e.g., cough, fever, shortness of breath, muscle aches)      Breathing causes occasional pain under right ribs  5. COUGH: \"Do you have a cough?\" If Yes, ask: \"How bad is the cough?\"        Yes, not bad  6. FEVER: \"Do you have a fever?\" If Yes, ask: \"What is your temperature, how was it measured, and when did it start?\"      No fever  7. RESPIRATORY STATUS: \"Describe your breathing?\" (e.g., shortness of breath, wheezing, unable to speak)       Breathing is fine and normal, denies shortness of breath, wheezing  8. BETTER-SAME-WORSE: " "\"Are you getting better, staying the same or getting worse compared to yesterday?\"  If getting worse, ask, \"In what way?\"      better  9. HIGH RISK DISEASE: \"Do you have any chronic medical problems?\" (e.g., asthma, heart or lung disease, weak immune system, obesity, etc.)      no  10. VACCINE: \"Have you had the COVID-19 vaccine?\" If Yes, ask: \"Which one, how many shots, when did you get it?\"        1 Nati, 2 Moderna  11. BOOSTER: \"Have you received your COVID-19 booster?\" If Yes, ask: \"Which one and when did you get it?\"        Nati  12. PREGNANCY: \"Is there any chance you are pregnant?\" \"When was your last menstrual period?\"        no  13. OTHER SYMPTOMS: \"Do you have any other symptoms?\"  (e.g., chills, fatigue, headache, loss of smell or taste, muscle pain, sore throat)        Denies above symptoms of chills, fever, HA, sore throat. Slight cough  14. O2 SATURATION MONITOR:  \"Do you use an oxygen saturation monitor (pulse oximeter) at home?\" If Yes, ask \"What is your reading (oxygen level) today?\" \"What is your usual oxygen saturation reading?\" (e.g., 95%)        NA    Protocols used: CORONAVIRUS (COVID-19) DIAGNOSED OR [id number] 1.18.2022, CORONAVIRUS (COVID-19) VACCINE QUESTIONS AND [id number] 1.18.2022    "

## 2022-08-03 NOTE — TELEPHONE ENCOUNTER
Please call patient -   I suspect this is just related to infection at this time but want to know if changes. Please let her know that if this worsens or she is having any trouble breathing she should be seen.  PN

## 2022-08-03 NOTE — TELEPHONE ENCOUNTER
"Patient positive for covid and was treated with Paxlovid 2 days ago. Patient is on day 4 of symptoms.    Patient took second dose of Paxlovid yesterday and has been noticing an \"annoyance\" pain under her right rib area often times when she takes a breath, rates it 2/10. This is new for her. The provider that ordered the Paxlovid 2 days ago was adamant that patient report any chest or abdominal pain when starting this medication. No history of heart or lung problems or blood clots.  Patient is not taking BCP.    This meets protocol for second level triage so routing to provider high priority to PCP and her team to advise further.     Reason for Disposition    HIGH RISK for severe COVID complications (e.g., weak immune system, age > 64 years, obesity with BMI > 25, pregnant, chronic lung disease or other chronic medical condition) (Exception: Already seen by PCP and no new or worsening symptoms.)    Additional Information    Negative: MILD difficulty breathing (e.g., minimal/no SOB at rest, SOB with walking, pulse <100)    Negative: Fever > 103 F (39.4 C)    Negative: [1] Fever > 101 F (38.3 C) AND [2] over 60 years of age    Negative: [1] Fever > 100.0 F (37.8 C) AND [2] bedridden (e.g., nursing home patient, CVA, chronic illness, recovering from surgery)    Protocols used: CORONAVIRUS (COVID-19) DIAGNOSED OR [id number] 1.18.2022    Deonna PRINCEN, RN    "

## 2022-08-03 NOTE — TELEPHONE ENCOUNTER
"  Answer Assessment - Initial Assessment Questions  1. MAIN CONCERN OR SYMPTOM:  \"What is your main concern right now?\" \"What question do you have?\" \"What's the main symptom you're worried about?\" (e.g., fever, pain, redness, swelling)      After patient took 2nd dose of Paxlovid yesterday, she has had \"a slight annoyance pain\" under her right rib  2. VACCINE: \"What vaccination did you receive?\" (e.g., none; AstraZeneca, J&J, Moderna, Pfizer, other) \"Is this your first, second shot, or booster?\" (e.g., first, second, booster)      *No Answer*  3. SYMPTOM ONSET: \"When did the *No Answer* begin?\" (e.g., not relevant; hours, days)       *No Answer*  4. SYMPTOM SEVERITY: \"How bad is it?\"       *No Answer*  5. FEVER: \"Is there a fever?\" If Yes, ask: \"What is it, how was it measured, and when did it start?\"       *No Answer*  6. PAST REACTIONS: \"Have you reacted to immunizations before?\" If Yes, ask: \"What happened?\"      *No Answer*  7. OTHER SYMPTOMS: \"Do you have any other symptoms?\"      *No Answer*    Protocols used: CORONAVIRUS (COVID-19) VACCINE QUESTIONS AND [id number] 1.18.2022    "

## 2022-10-03 NOTE — PROGRESS NOTES
Preventive Health Recommendations  Female Ages 26 - 39  Yearly exam:   See your health care provider every year in order to    Review health changes.     Discuss preventive care.      Review your medicines if you your doctor has prescribed any.    Until age 30: Get a Pap test every three years (more often if you have had an abnormal result).    After age 30: Talk to your doctor about whether you should have a Pap test every 3 years or have a Pap test with HPV screening every 5 years.   You do not need a Pap test if your uterus was removed (hysterectomy) and you have not had cancer.  You should be tested each year for STDs (sexually transmitted diseases), if you're at risk.   Talk to your provider about how often to have your cholesterol checked.  If you are at risk for diabetes, you should have a diabetes test (fasting glucose).  Shots: Get a flu shot each year. Get a tetanus shot every 10 years.   Nutrition:     Eat at least 5 servings of fruits and vegetables each day.    Eat whole-grain bread, whole-wheat pasta and brown rice instead of white grains and rice.    Get adequate Calcium and Vitamin D.     Lifestyle    Exercise at least 150 minutes a week (30 minutes a day, 5 days of the week). This will help you control your weight and prevent disease.    Limit alcohol to one drink per day.    No smoking.     Wear sunscreen to prevent skin cancer.    See your dentist every six months for an exam and cleaning.     "S: General appearance: comfortable with epidural, states she has sensation to urinate, straight cath performed.     Blood pressure 110/62, pulse 98, temperature 98.3  F (36.8  C), temperature source Oral, resp. rate 16, height 1.549 m (5' 1\"), weight 103.42 kg (228 lb), last menstrual period 05/08/2016, SpO2 99 %.  Patient Vitals for the past 24 hrs:   BP Temp Temp src Pulse Resp SpO2 Height Weight   02/11/17 0805 110/62 mmHg - - - - - - -   02/11/17 0800 120/63 mmHg - - - - - - -   02/11/17 0755 112/59 mmHg - - - - - - -   02/11/17 0747 108/62 mmHg - - - - - - -   02/11/17 0745 111/62 mmHg - - - - - - -   02/11/17 0744 109/63 mmHg - - - - - - -   02/11/17 0739 112/58 mmHg - - - - 99 % - -   02/11/17 0737 122/60 mmHg - - - - - - -   02/11/17 0735 129/76 mmHg - - - - - - -   02/11/17 0721 127/74 mmHg - - - - - - -   02/11/17 0719 124/72 mmHg - - - - - - -   02/11/17 0431 121/74 mmHg - - - 16 - - -   02/11/17 0258 123/76 mmHg 98.3  F (36.8  C) Oral 98 20 - 1.549 m (5' 1\") 103.42 kg (228 lb)       CONTRACTIONS: Contractions every 2-6 minutes.  Palpate: moderate  Pitocin- none,  Antibiotics- none  FETAL HEART TONES: baseline 145 with moderate FHR variability and  pos accelerations.  No decelerations present.    ROM: not ruptured  PELVIC EXAM:PELVIC EXAM: 8/ 90%/ Anterior/ soft/ -2 with buldging membranes   ASSESSMENT:  ==============  IUP @ 39w6d in active labor   Fetal Heart Rate Tracing category one  GBS- negative     PLAN:  ===========  Frequent position changes to facilitate labor with epidural anesthesia.  Pain medication- comfortable with epidural   Reevaluate in 2-4 hours prn  MD consultant on call Dr. Anderson/ available prn     Hailey Simms DNP Student, [facility] working with Deyanira Moreno CNM   "

## 2022-10-14 ENCOUNTER — OFFICE VISIT (OUTPATIENT)
Dept: FAMILY MEDICINE | Facility: CLINIC | Age: 42
End: 2022-10-14
Payer: COMMERCIAL

## 2022-10-14 VITALS
HEART RATE: 72 BPM | BODY MASS INDEX: 46.22 KG/M2 | HEIGHT: 62 IN | TEMPERATURE: 97.6 F | OXYGEN SATURATION: 100 % | WEIGHT: 251.2 LBS

## 2022-10-14 DIAGNOSIS — Z00.00 ROUTINE GENERAL MEDICAL EXAMINATION AT A HEALTH CARE FACILITY: Primary | ICD-10-CM

## 2022-10-14 DIAGNOSIS — N93.8 DUB (DYSFUNCTIONAL UTERINE BLEEDING): ICD-10-CM

## 2022-10-14 DIAGNOSIS — E66.01 MORBID OBESITY (H): ICD-10-CM

## 2022-10-14 LAB
ERYTHROCYTE [DISTWIDTH] IN BLOOD BY AUTOMATED COUNT: 14.5 % (ref 10–15)
HCT VFR BLD AUTO: 39.3 % (ref 35–47)
HGB BLD-MCNC: 12.8 G/DL (ref 11.7–15.7)
MCH RBC QN AUTO: 27.4 PG (ref 26.5–33)
MCHC RBC AUTO-ENTMCNC: 32.6 G/DL (ref 31.5–36.5)
MCV RBC AUTO: 84 FL (ref 78–100)
PLATELET # BLD AUTO: 326 10E3/UL (ref 150–450)
RBC # BLD AUTO: 4.67 10E6/UL (ref 3.8–5.2)
WBC # BLD AUTO: 9.6 10E3/UL (ref 4–11)

## 2022-10-14 PROCEDURE — 36415 COLL VENOUS BLD VENIPUNCTURE: CPT | Performed by: FAMILY MEDICINE

## 2022-10-14 PROCEDURE — 99396 PREV VISIT EST AGE 40-64: CPT | Mod: 25 | Performed by: FAMILY MEDICINE

## 2022-10-14 PROCEDURE — 80053 COMPREHEN METABOLIC PANEL: CPT | Performed by: FAMILY MEDICINE

## 2022-10-14 PROCEDURE — 84443 ASSAY THYROID STIM HORMONE: CPT | Performed by: FAMILY MEDICINE

## 2022-10-14 PROCEDURE — 80061 LIPID PANEL: CPT | Performed by: FAMILY MEDICINE

## 2022-10-14 PROCEDURE — 85027 COMPLETE CBC AUTOMATED: CPT | Performed by: FAMILY MEDICINE

## 2022-10-14 PROCEDURE — 90686 IIV4 VACC NO PRSV 0.5 ML IM: CPT | Performed by: FAMILY MEDICINE

## 2022-10-14 PROCEDURE — 90471 IMMUNIZATION ADMIN: CPT | Performed by: FAMILY MEDICINE

## 2022-10-14 ASSESSMENT — ENCOUNTER SYMPTOMS
ABDOMINAL PAIN: 0
DIZZINESS: 0
NERVOUS/ANXIOUS: 0
CHILLS: 0
PALPITATIONS: 0
HEADACHES: 0
HEMATOCHEZIA: 0
HEARTBURN: 0
DIARRHEA: 0
SORE THROAT: 0
BREAST MASS: 0
SHORTNESS OF BREATH: 0
HEMATURIA: 0
FREQUENCY: 0
WEAKNESS: 0
EYE PAIN: 0
COUGH: 0
PARESTHESIAS: 0
JOINT SWELLING: 0
FEVER: 0
DYSURIA: 0
ARTHRALGIAS: 0
CONSTIPATION: 0
NAUSEA: 0
MYALGIAS: 1

## 2022-10-14 NOTE — PROGRESS NOTES
SUBJECTIVE:   CC: Ramya is an 42 year old who presents for preventive health visit.         Healthy Habits:     Getting at least 3 servings of Calcium per day:  NO    Bi-annual eye exam:  Yes    Dental care twice a year:  NO    Sleep apnea or symptoms of sleep apnea:  Daytime drowsiness    Diet:  Regular (no restrictions)    Frequency of exercise:  2-3 days/week    Duration of exercise:  15-30 minutes    Taking medications regularly:  Yes    Medication side effects:  Not applicable    PHQ-2 Total Score: 2    Additional concerns today:  No          -------------------------------------    Today's PHQ-2 Score:   PHQ-2 (  [employer]) 10/14/2022   Q1: Little interest or pleasure in doing things 1   Q2: Feeling down, depressed or hopeless 1   PHQ-2 Score 2   PHQ-2 Total Score (12-17 Years)- Positive if 3 or more points; Administer PHQ-A if positive -   Q1: Little interest or pleasure in doing things Several days   Q2: Feeling down, depressed or hopeless Several days   PHQ-2 Score 2       Abuse: Current or Past (Physical, Sexual or Emotional) - No  Do you feel safe in your environment? Yes        Social History     Tobacco Use     Smoking status: Never     Smokeless tobacco: Never   Substance Use Topics     Alcohol use: Yes     Alcohol/week: 0.0 standard drinks     Comment: 1 time or less per week     If you drink alcohol do you typically have >3 drinks per day or >7 drinks per week? No    Alcohol Use 10/14/2022   Prescreen: >3 drinks/day or >7 drinks/week? No   Prescreen: >3 drinks/day or >7 drinks/week? -   No flowsheet data found.    Reviewed orders with patient.  Reviewed health maintenance and updated orders accordingly - Yes  Patient Active Problem List   Diagnosis     BMI >30     History of  section complicating pregnancy     Morbid obesity (H)     Past Surgical History:   Procedure Laterality Date      SECTION N/A 2014    Procedure:  SECTION;  Surgeon: Maribel Nixon MD;  [facility]      SECTION N/A 2017    Procedure:  SECTION;  Surgeon: Diane Anderson MD;  Location: [facility]      SECTION N/A 2020    Procedure: REPEAT  SECTION;  Surgeon: Yadi Ricci MD;  Location: [facility]     ZZHC EXC LESION OF TONGUE W/O CLOSURE      benign       Social History     Tobacco Use     Smoking status: Never     Smokeless tobacco: Never   Substance Use Topics     Alcohol use: Yes     Alcohol/week: 0.0 standard drinks     Comment: 1 time or less per week     Family History   Problem Relation Age of Onset     Heart Disease Paternal Grandfather      Heart Disease Maternal Grandfather      Allergies Mother      Eye Disorder Mother      Allergies Maternal Grandmother      Cerebrovascular Disease Maternal Grandmother 92     Depression Maternal Uncle            Breast Cancer Screening:    Breast CA Risk Assessment (FHS-7) 10/8/2021   Do you have a family history of breast, colon, or ovarian cancer? No / Unknown         Mammogram Screening - Offered annual screening and updated Health Maintenance for mutual plan based on risk factor consideration    Pertinent mammograms are reviewed under the imaging tab.    History of abnormal Pap smear: NO - age 30-65 PAP every 5 years with negative HPV co-testing recommended  PAP / HPV Latest Ref Rng & Units 2019   PAP (Historical) - NIL NIL NIL   HPV16 NEG:Negative Negative Negative -   HPV18 NEG:Negative Negative Negative -   HRHPV NEG:Negative Negative Negative -     Reviewed and updated as needed this visit by clinical staff                  Reviewed and updated as needed this visit by Provider                     Review of Systems   Constitutional: Negative for chills and fever.   HENT: Negative for congestion, ear pain, hearing loss and sore throat.    Eyes: Negative for pain and visual disturbance.   Respiratory: Negative for cough and shortness of breath.    Cardiovascular: Negative  for chest pain, palpitations and peripheral edema.   Gastrointestinal: Negative for abdominal pain, constipation, diarrhea, heartburn, hematochezia and nausea.   Breasts:  Negative for tenderness, breast mass and discharge.   Genitourinary: Negative for dysuria, frequency, genital sores, hematuria, pelvic pain, urgency, vaginal bleeding and vaginal discharge.   Musculoskeletal: Positive for myalgias. Negative for arthralgias and joint swelling.   Skin: Negative for rash.   Neurological: Negative for dizziness, weakness, headaches and paresthesias.   Psychiatric/Behavioral: Negative for mood changes. The patient is not nervous/anxious.           OBJECTIVE:   There were no vitals taken for this visit.  Physical Exam  GENERAL: alert and no distress  EYES: Eyes grossly normal to inspection, PERRL and conjunctivae and sclerae normal  HENT: ear canals and TM's normal, nose and mouth without ulcers or lesions  NECK: no adenopathy, no asymmetry, masses, or scars and thyroid normal to palpation  RESP: lungs clear to auscultation - no rales, rhonchi or wheezes  BREAST: normal without masses, tenderness or nipple discharge and no palpable axillary masses or adenopathy  CV: regular rate and rhythm, normal S1 S2, no S3 or S4, no murmur, click or rub, no peripheral edema and peripheral pulses strong  ABDOMEN: soft, nontender, no hepatosplenomegaly, no masses and bowel sounds normal  MS: no gross musculoskeletal defects noted, no edema  SKIN: no suspicious lesions or rashes  NEURO: Normal strength and tone, mentation intact and speech normal  PSYCH: mentation appears normal, affect normal/bright    Diagnostic Test Results:  Labs reviewed in Epic    ASSESSMENT/PLAN:   (Z00.00) Routine general medical examination at a health care facility  (primary encounter diagnosis)  Comment:    Plan: Lipid panel reflex to direct LDL Non-fasting,         Comprehensive metabolic panel (BMP + Alb, Alk         Phos, ALT, AST, Total. Bili, TP), CBC  "with         platelets             (N93.8) DUB (dysfunctional uterine bleeding)  Comment:    Plan: CBC with platelets, TSH with free T4 reflex, US        Pelvic Complete with Transvaginal                   COUNSELING:  Reviewed preventive health counseling, as reflected in patient instructions    Estimated body mass index is 43.46 kg/m  as calculated from the following:    Height as of 10/30/21: 1.549 m (5' 1\").    Weight as of 10/30/21: 104.3 kg (230 lb).    Weight management plan: Discussed healthy diet and exercise guidelines    She reports that she has never smoked. She has never used smokeless tobacco.      Counseling Resources:  ATP IV Guidelines  Pooled Cohorts Equation Calculator  Breast Cancer Risk Calculator  BRCA-Related Cancer Risk Assessment: FHS-7 Tool  FRAX Risk Assessment  ICSI Preventive Guidelines  Dietary Guidelines for Americans, 2010  USDA's MyPlate  ASA Prophylaxis  Lung CA Screening    Naida Elliott, DO  [facility]  "

## 2022-10-14 NOTE — PATIENT INSTRUCTIONS
Preventive Health Recommendations  Female Ages 40 to 49    Yearly exam:   See your health care provider every year in order to  Review health changes.   Discuss preventive care.    Review your medicines if your doctor prescribed any.    Get a Pap test every three years (unless you have an abnormal result and your provider advises testing more often).    If you get Pap tests with HPV test, you only need to test every 5 years, unless you have an abnormal result. You do not need a Pap test if your uterus was removed (hysterectomy) and you have not had cancer.    You should be tested each year for STDs (sexually transmitted diseases), if you're at risk.   Ask your doctor if you should have a mammogram.    Have a colonoscopy (test for colon cancer) if someone in your family has had colon cancer or polyps before age 50.     Have a cholesterol test every 5 years.     Have a diabetes test (fasting glucose) after age 45. If you are at risk for diabetes, you should have this test every 3 years.    Shots: Get a flu shot each year. Get a tetanus shot every 10 years.     Nutrition:   Eat at least 5 servings of fruits and vegetables each day.  Eat whole-grain bread, whole-wheat pasta and brown rice instead of white grains and rice.  Get adequate Calcium and Vitamin D.      Lifestyle  Exercise at least 150 minutes a week (an average of 30 minutes a day, 5 days a week). This will help you control your weight and prevent disease.  Limit alcohol to one drink per day.  No smoking.   Wear sunscreen to prevent skin cancer.  See your dentist every six months for an exam and cleaning.  PLEASE CALL TO SCHEDULE YOUR MAMMOGRAM  [facility] [phone]  [facility] [phone]  [facility]   [phone]  Central Scheduling (all locations) [phone]    If you are under/uninsured, we recommend you contact the [facility]. They offer mammograms on a sliding fee charge. You can schedule  with them at [phone].

## 2022-10-16 ENCOUNTER — MYC MEDICAL ADVICE (OUTPATIENT)
Dept: FAMILY MEDICINE | Facility: CLINIC | Age: 42
End: 2022-10-16

## 2022-10-16 LAB
ALBUMIN SERPL-MCNC: 3.9 G/DL (ref 3.4–5)
ALP SERPL-CCNC: 69 U/L (ref 40–150)
ALT SERPL W P-5'-P-CCNC: 24 U/L (ref 0–50)
ANION GAP SERPL CALCULATED.3IONS-SCNC: 7 MMOL/L (ref 3–14)
AST SERPL W P-5'-P-CCNC: 18 U/L (ref 0–45)
BILIRUB SERPL-MCNC: 0.3 MG/DL (ref 0.2–1.3)
BUN SERPL-MCNC: 12 MG/DL (ref 7–30)
CALCIUM SERPL-MCNC: 9.1 MG/DL (ref 8.5–10.1)
CHLORIDE BLD-SCNC: 107 MMOL/L (ref 94–109)
CHOLEST SERPL-MCNC: 221 MG/DL
CO2 SERPL-SCNC: 24 MMOL/L (ref 20–32)
CREAT SERPL-MCNC: 0.66 MG/DL (ref 0.52–1.04)
FASTING STATUS PATIENT QL REPORTED: YES
GFR SERPL CREATININE-BSD FRML MDRD: >90 ML/MIN/1.73M2
GLUCOSE BLD-MCNC: 90 MG/DL (ref 70–99)
HDLC SERPL-MCNC: 62 MG/DL
LDLC SERPL CALC-MCNC: 127 MG/DL
NONHDLC SERPL-MCNC: 159 MG/DL
POTASSIUM BLD-SCNC: 4.2 MMOL/L (ref 3.4–5.3)
PROT SERPL-MCNC: 7.9 G/DL (ref 6.8–8.8)
SODIUM SERPL-SCNC: 138 MMOL/L (ref 133–144)
TRIGL SERPL-MCNC: 158 MG/DL
TSH SERPL DL<=0.005 MIU/L-ACNC: 0.71 MU/L (ref 0.4–4)

## 2022-10-17 NOTE — TELEPHONE ENCOUNTER
PN,    Please see [employer] message.  Patient was not fasting for her labs.    Thank you,  Linda Valerio RN

## 2022-10-18 NOTE — RESULT ENCOUNTER NOTE
Dear Ramya,   Your test results are all back -   -Normal red blood cell (hgb) levels, normal white blood cell count and normal platelet levels.  -Cholesterol levels (LDL,HDL, Triglycerides) are borderline high but stable- keep working on healthy diet and exercise.  ADVISE: rechecking in 1 year.   -Liver and gallbladder tests are normal (ALT,AST, Alk phos, bilirubin), kidney function is normal (Cr, GFR), sodium is normal, potassium is normal, calcium is normal, glucose is normal.  -TSH (thyroid stimulating hormone) level is normal which indicates normal thyroid function.  Let us know if you have any questions.  -Naida Elliott, DO

## 2022-11-01 ENCOUNTER — ANCILLARY PROCEDURE (OUTPATIENT)
Dept: ULTRASOUND IMAGING | Facility: CLINIC | Age: 42
End: 2022-11-01
Attending: FAMILY MEDICINE
Payer: COMMERCIAL

## 2022-11-01 DIAGNOSIS — N93.8 DUB (DYSFUNCTIONAL UTERINE BLEEDING): ICD-10-CM

## 2022-11-01 PROCEDURE — 76830 TRANSVAGINAL US NON-OB: CPT | Performed by: OBSTETRICS & GYNECOLOGY

## 2022-11-01 PROCEDURE — 76856 US EXAM PELVIC COMPLETE: CPT | Performed by: OBSTETRICS & GYNECOLOGY

## 2022-11-09 NOTE — RESULT ENCOUNTER NOTE
Dear Ramya,   Your test results are all back -   The pelvic ultrasound is essentially normal.  No explanation for your bleeding.  No fibroid or cyst or polyp.  One last work-up is to do an endometrial biopsy - a procedure in the office to collect a small amount of the inside of the uterus to look at by pathology.  It helps rule out any atypical or abnormal cells (precancer)  in the uterus that you can't see on ultrasound.  Please schedule an appointment with me so we can do that test and discuss management if needed.    Let us know if you have any questions.  -Naida Elliott, DO

## 2022-11-30 ENCOUNTER — HOSPITAL ENCOUNTER (OUTPATIENT)
Dept: MAMMOGRAPHY | Facility: CLINIC | Age: 42
Discharge: HOME | End: 2022-11-30
Attending: FAMILY MEDICINE | Admitting: FAMILY MEDICINE
Payer: COMMERCIAL

## 2022-11-30 DIAGNOSIS — Z12.31 VISIT FOR SCREENING MAMMOGRAM: ICD-10-CM

## 2022-11-30 PROCEDURE — 77067 SCR MAMMO BI INCL CAD: CPT

## 2023-02-07 ENCOUNTER — OFFICE VISIT (OUTPATIENT)
Dept: FAMILY MEDICINE | Facility: CLINIC | Age: 43
End: 2023-02-07
Payer: COMMERCIAL

## 2023-02-07 VITALS
WEIGHT: 252 LBS | DIASTOLIC BLOOD PRESSURE: 72 MMHG | OXYGEN SATURATION: 96 % | BODY MASS INDEX: 44.65 KG/M2 | HEART RATE: 87 BPM | TEMPERATURE: 97.5 F | SYSTOLIC BLOOD PRESSURE: 128 MMHG | HEIGHT: 63 IN

## 2023-02-07 DIAGNOSIS — N93.8 DUB (DYSFUNCTIONAL UTERINE BLEEDING): Primary | ICD-10-CM

## 2023-02-07 PROCEDURE — 88305 TISSUE EXAM BY PATHOLOGIST: CPT | Performed by: PATHOLOGY

## 2023-02-07 PROCEDURE — 58100 BIOPSY OF UTERUS LINING: CPT | Performed by: FAMILY MEDICINE

## 2023-02-07 ASSESSMENT — ANXIETY QUESTIONNAIRES
6. BECOMING EASILY ANNOYED OR IRRITABLE: SEVERAL DAYS
4. TROUBLE RELAXING: NOT AT ALL
GAD7 TOTAL SCORE: 5
7. FEELING AFRAID AS IF SOMETHING AWFUL MIGHT HAPPEN: NOT AT ALL
7. FEELING AFRAID AS IF SOMETHING AWFUL MIGHT HAPPEN: NOT AT ALL
GAD7 TOTAL SCORE: 5
2. NOT BEING ABLE TO STOP OR CONTROL WORRYING: SEVERAL DAYS
GAD7 TOTAL SCORE: 5
3. WORRYING TOO MUCH ABOUT DIFFERENT THINGS: SEVERAL DAYS
5. BEING SO RESTLESS THAT IT IS HARD TO SIT STILL: NOT AT ALL
1. FEELING NERVOUS, ANXIOUS, OR ON EDGE: MORE THAN HALF THE DAYS
8. IF YOU CHECKED OFF ANY PROBLEMS, HOW DIFFICULT HAVE THESE MADE IT FOR YOU TO DO YOUR WORK, TAKE CARE OF THINGS AT HOME, OR GET ALONG WITH OTHER PEOPLE?: SOMEWHAT DIFFICULT
IF YOU CHECKED OFF ANY PROBLEMS ON THIS QUESTIONNAIRE, HOW DIFFICULT HAVE THESE PROBLEMS MADE IT FOR YOU TO DO YOUR WORK, TAKE CARE OF THINGS AT HOME, OR GET ALONG WITH OTHER PEOPLE: SOMEWHAT DIFFICULT

## 2023-02-07 ASSESSMENT — PATIENT HEALTH QUESTIONNAIRE - PHQ9
SUM OF ALL RESPONSES TO PHQ QUESTIONS 1-9: 6
10. IF YOU CHECKED OFF ANY PROBLEMS, HOW DIFFICULT HAVE THESE PROBLEMS MADE IT FOR YOU TO DO YOUR WORK, TAKE CARE OF THINGS AT HOME, OR GET ALONG WITH OTHER PEOPLE: SOMEWHAT DIFFICULT
SUM OF ALL RESPONSES TO PHQ QUESTIONS 1-9: 6

## 2023-02-07 ASSESSMENT — PAIN SCALES - GENERAL: PAINLEVEL: NO PAIN (0)

## 2023-02-07 NOTE — PROGRESS NOTES
Assessment & Plan     DUB (dysfunctional uterine bleeding)  Very heavy bleeding first 24-36 hours of cycle - 1 cup every 6 hours  Pelvic ultrasound showed endometrial thickening of 12.3  Biopsy completed today  Will based f/u on pathology -   If negative - will monitor  If positive will refer to gyn  - Surgical Pathology Exam  - ENDOMETRIAL BIOPSY W/O CERVICAL DILATION                 No follow-ups on file.    Naida Elliott, [facility]    Zay Bui is a 42 year old, presenting for the following health issues:  RECHECK      History of Present Illness       Reason for visit:  Ultrasound follow up    She eats 2-3 servings of fruits and vegetables daily.She consumes 1 sweetened beverage(s) daily.She exercises with enough effort to increase her heart rate 20 to 29 minutes per day.  She exercises with enough effort to increase her heart rate 4 days per week.   She is taking medications regularly.    Today's PHQ-9         PHQ-9 Total Score: 6    PHQ-9 Q9 Thoughts of better off dead/self-harm past 2 weeks :   Not at all    How difficult have these problems made it for you to do your work, take care of things at home, or get along with other people: Somewhat difficult  Today's ERIKA-7 Score: 5       DUB -   First 24-36 hours of very heavy bleeding -   Full cycle lasts 5-6 days  Middle of cycle will have clot  Using cup - every 6 hours with a regular cup  Menses are q28 days        Ultrasound results      Review of Systems   Constitutional, HEENT, cardiovascular, pulmonary, gi and gu systems are negative, except as otherwise noted.      Objective    There were no vitals taken for this visit.  There is no height or weight on file to calculate BMI.  Physical Exam   GENERAL: alert and no distress   (female): normal female external genitalia, normal urethral meatus , vaginal mucosa pink, moist, well rugated and normal cervix, adnexae, and uterus without masses.    Speculum was placed into the  vaginal vault: Cervix was visualized and appears multiparous.  Cervix was prepped  with betadine.  A pipelle was easily introduced through the cervical os.  The uterus sounded to 7.5 cm.  Tenaculum was  used on the cervix. Endometrial sample was obtained.  There was very minimal bleeding from the cervical os after removal of the pipelle.  Silver nitrate was not used to cauterize the tenaculum sites.  The patient tolerated the procedure well.    A: Endometrial Biopsy    P: Pathology report pending.  Will contact patient when results become available.  The patient will refrain from anything in the vagina for 1 week.  She report any difficulty with pelvic pain, bleeding or fevers.

## 2023-02-10 LAB
PATH REPORT.COMMENTS IMP SPEC: NORMAL
PATH REPORT.COMMENTS IMP SPEC: NORMAL
PATH REPORT.FINAL DX SPEC: NORMAL
PATH REPORT.GROSS SPEC: NORMAL
PATH REPORT.MICROSCOPIC SPEC OTHER STN: NORMAL
PATH REPORT.RELEVANT HX SPEC: NORMAL
PHOTO IMAGE: NORMAL

## 2023-02-14 NOTE — RESULT ENCOUNTER NOTE
Dear Ramya,   Your test results are all back -   Good news the biopsy results are normal tissue.  Nothing concerning found.  Let us know if you have any questions.  -Naida Elliott, DO

## 2023-11-06 ASSESSMENT — ENCOUNTER SYMPTOMS
DIARRHEA: 0
EYE PAIN: 0
WEAKNESS: 0
FEVER: 0
SHORTNESS OF BREATH: 0
HEMATOCHEZIA: 0
PARESTHESIAS: 0
NERVOUS/ANXIOUS: 0
MYALGIAS: 0
JOINT SWELLING: 0
DIZZINESS: 0
ARTHRALGIAS: 0
PALPITATIONS: 0
HEMATURIA: 0
BREAST MASS: 0
ABDOMINAL PAIN: 0
CHILLS: 0
HEADACHES: 0
FREQUENCY: 0
NAUSEA: 0
DYSURIA: 0
COUGH: 1
HEARTBURN: 0
CONSTIPATION: 0
SORE THROAT: 0

## 2023-11-07 ENCOUNTER — OFFICE VISIT (OUTPATIENT)
Dept: FAMILY MEDICINE | Facility: CLINIC | Age: 43
End: 2023-11-07
Payer: COMMERCIAL

## 2023-11-07 VITALS
OXYGEN SATURATION: 100 % | BODY MASS INDEX: 45.6 KG/M2 | DIASTOLIC BLOOD PRESSURE: 92 MMHG | HEART RATE: 69 BPM | HEIGHT: 62 IN | WEIGHT: 247.8 LBS | SYSTOLIC BLOOD PRESSURE: 136 MMHG | RESPIRATION RATE: 16 BRPM | TEMPERATURE: 97.3 F

## 2023-11-07 DIAGNOSIS — Z12.31 ENCOUNTER FOR SCREENING MAMMOGRAM FOR BREAST CANCER: ICD-10-CM

## 2023-11-07 DIAGNOSIS — Z00.00 ROUTINE GENERAL MEDICAL EXAMINATION AT A HEALTH CARE FACILITY: Primary | ICD-10-CM

## 2023-11-07 DIAGNOSIS — R03.0 ELEVATED BLOOD PRESSURE READING WITHOUT DIAGNOSIS OF HYPERTENSION: ICD-10-CM

## 2023-11-07 DIAGNOSIS — Z12.4 SCREENING FOR CERVICAL CANCER: ICD-10-CM

## 2023-11-07 PROBLEM — O34.219 HISTORY OF CESAREAN SECTION COMPLICATING PREGNANCY: Status: RESOLVED | Noted: 2019-11-21 | Resolved: 2023-11-07

## 2023-11-07 LAB
ALBUMIN SERPL BCG-MCNC: 4.4 G/DL (ref 3.5–5.2)
ALP SERPL-CCNC: 76 U/L (ref 35–104)
ALT SERPL W P-5'-P-CCNC: 25 U/L (ref 0–50)
ANION GAP SERPL CALCULATED.3IONS-SCNC: 12 MMOL/L (ref 7–15)
AST SERPL W P-5'-P-CCNC: 25 U/L (ref 0–45)
BILIRUB SERPL-MCNC: 0.2 MG/DL
BUN SERPL-MCNC: 11.5 MG/DL (ref 6–20)
CALCIUM SERPL-MCNC: 9.2 MG/DL (ref 8.6–10)
CHLORIDE SERPL-SCNC: 106 MMOL/L (ref 98–107)
CHOLEST SERPL-MCNC: 192 MG/DL
CREAT SERPL-MCNC: 0.64 MG/DL (ref 0.51–0.95)
DEPRECATED HCO3 PLAS-SCNC: 23 MMOL/L (ref 22–29)
EGFRCR SERPLBLD CKD-EPI 2021: >90 ML/MIN/1.73M2
ERYTHROCYTE [DISTWIDTH] IN BLOOD BY AUTOMATED COUNT: 14 % (ref 10–15)
GLUCOSE SERPL-MCNC: 93 MG/DL (ref 70–99)
HCT VFR BLD AUTO: 40.6 % (ref 35–47)
HDLC SERPL-MCNC: 59 MG/DL
HGB BLD-MCNC: 12.8 G/DL (ref 11.7–15.7)
LDLC SERPL CALC-MCNC: 108 MG/DL
MCH RBC QN AUTO: 26.8 PG (ref 26.5–33)
MCHC RBC AUTO-ENTMCNC: 31.5 G/DL (ref 31.5–36.5)
MCV RBC AUTO: 85 FL (ref 78–100)
NONHDLC SERPL-MCNC: 133 MG/DL
PLATELET # BLD AUTO: 306 10E3/UL (ref 150–450)
POTASSIUM SERPL-SCNC: 4.1 MMOL/L (ref 3.4–5.3)
PROT SERPL-MCNC: 7.7 G/DL (ref 6.4–8.3)
RBC # BLD AUTO: 4.77 10E6/UL (ref 3.8–5.2)
SODIUM SERPL-SCNC: 141 MMOL/L (ref 135–145)
TRIGL SERPL-MCNC: 124 MG/DL
WBC # BLD AUTO: 10 10E3/UL (ref 4–11)

## 2023-11-07 PROCEDURE — G0145 SCR C/V CYTO,THINLAYER,RESCR: HCPCS | Performed by: FAMILY MEDICINE

## 2023-11-07 PROCEDURE — 80061 LIPID PANEL: CPT | Performed by: FAMILY MEDICINE

## 2023-11-07 PROCEDURE — 99396 PREV VISIT EST AGE 40-64: CPT | Performed by: FAMILY MEDICINE

## 2023-11-07 PROCEDURE — 80053 COMPREHEN METABOLIC PANEL: CPT | Performed by: FAMILY MEDICINE

## 2023-11-07 PROCEDURE — 36415 COLL VENOUS BLD VENIPUNCTURE: CPT | Performed by: FAMILY MEDICINE

## 2023-11-07 PROCEDURE — 87624 HPV HI-RISK TYP POOLED RSLT: CPT | Performed by: FAMILY MEDICINE

## 2023-11-07 PROCEDURE — 85027 COMPLETE CBC AUTOMATED: CPT | Performed by: FAMILY MEDICINE

## 2023-11-07 ASSESSMENT — ANXIETY QUESTIONNAIRES
2. NOT BEING ABLE TO STOP OR CONTROL WORRYING: SEVERAL DAYS
GAD7 TOTAL SCORE: 3
GAD7 TOTAL SCORE: 3
7. FEELING AFRAID AS IF SOMETHING AWFUL MIGHT HAPPEN: NOT AT ALL
3. WORRYING TOO MUCH ABOUT DIFFERENT THINGS: SEVERAL DAYS
4. TROUBLE RELAXING: NOT AT ALL
1. FEELING NERVOUS, ANXIOUS, OR ON EDGE: SEVERAL DAYS
6. BECOMING EASILY ANNOYED OR IRRITABLE: NOT AT ALL
IF YOU CHECKED OFF ANY PROBLEMS ON THIS QUESTIONNAIRE, HOW DIFFICULT HAVE THESE PROBLEMS MADE IT FOR YOU TO DO YOUR WORK, TAKE CARE OF THINGS AT HOME, OR GET ALONG WITH OTHER PEOPLE: NOT DIFFICULT AT ALL
5. BEING SO RESTLESS THAT IT IS HARD TO SIT STILL: NOT AT ALL

## 2023-11-07 ASSESSMENT — ENCOUNTER SYMPTOMS
PARESTHESIAS: 0
FEVER: 0
DYSURIA: 0
CHILLS: 0
CONSTIPATION: 0
NERVOUS/ANXIOUS: 0
MYALGIAS: 0
FREQUENCY: 0
HEADACHES: 0
NAUSEA: 0
WEAKNESS: 0
HEMATURIA: 0
HEMATOCHEZIA: 0
COUGH: 1
BREAST MASS: 0
EYE PAIN: 0
ARTHRALGIAS: 0
SORE THROAT: 0
DIZZINESS: 0
ABDOMINAL PAIN: 0
PALPITATIONS: 0
SHORTNESS OF BREATH: 0
DIARRHEA: 0
HEARTBURN: 0
JOINT SWELLING: 0

## 2023-11-07 ASSESSMENT — PATIENT HEALTH QUESTIONNAIRE - PHQ9
SUM OF ALL RESPONSES TO PHQ QUESTIONS 1-9: 5
10. IF YOU CHECKED OFF ANY PROBLEMS, HOW DIFFICULT HAVE THESE PROBLEMS MADE IT FOR YOU TO DO YOUR WORK, TAKE CARE OF THINGS AT HOME, OR GET ALONG WITH OTHER PEOPLE: SOMEWHAT DIFFICULT
SUM OF ALL RESPONSES TO PHQ QUESTIONS 1-9: 5

## 2023-11-07 ASSESSMENT — PAIN SCALES - GENERAL: PAINLEVEL: MODERATE PAIN (4)

## 2023-11-07 NOTE — PROGRESS NOTES
SUBJECTIVE:   CC: Ramya is an 43 year old who presents for preventive health visit.       2023    10:55 AM   Additional Questions   Roomed by Yolis       Healthy Habits:     Getting at least 3 servings of Calcium per day:  Yes    Bi-annual eye exam:  Yes    Dental care twice a year:  Yes    Sleep apnea or symptoms of sleep apnea:  None    Diet:  Regular (no restrictions)    Frequency of exercise:  1 day/week    Duration of exercise:  Less than 15 minutes    Taking medications regularly:  Yes    Medication side effects:  Not applicable    Additional concerns today:  No  Answers submitted by the patient for this visit:  Patient Health Questionnaire (Submitted on 2023)  If you checked off any problems, how difficult have these problems made it for you to do your work, take care of things at home, or get along with other people?: Somewhat difficult  PHQ9 TOTAL SCORE: 5  ERIKA-7 (Submitted on 2023)  ERIKA 7 TOTAL SCORE: 3      Today's PHQ-9 Score:       2023    10:53 AM   PHQ-9 SCORE   PHQ-9 Total Score MyChart 5 (Mild depression)   PHQ-9 Total Score 5                 -------------------------------------      Social History     Tobacco Use    Smoking status: Never    Smokeless tobacco: Never   Substance Use Topics    Alcohol use: Yes     Comment: 2 times or less per week             2023     9:35 AM   Alcohol Use   Prescreen: >3 drinks/day or >7 drinks/week? No       Reviewed orders with patient.  Reviewed health maintenance and updated orders accordingly - Yes  Patient Active Problem List   Diagnosis    Morbid obesity (H)     Past Surgical History:   Procedure Laterality Date     SECTION N/A 2014    Procedure:  SECTION;  Surgeon: Maribel Nixon MD;  [facility]: [facility]     SECTION N/A 2017    Procedure:  SECTION;  Surgeon: Diane Anderson MD;  [facility]: [facility]     SECTION N/A 2020    Procedure: REPEAT  SECTION;  Surgeon:  Yadi Ricci MD;  Location:  L+D    ZZHC EXC LESION OF TONGUE W/O CLOSURE  2002    benign       Social History     Tobacco Use    Smoking status: Never    Smokeless tobacco: Never   Substance Use Topics    Alcohol use: Yes     Comment: 2 times or less per week     Family History   Problem Relation Age of Onset    Heart Disease Paternal Grandfather     Heart Disease Maternal Grandfather     Allergies Mother     Eye Disorder Mother     Allergies Maternal Grandmother     Cerebrovascular Disease Maternal Grandmother 92    Depression Maternal Uncle            Breast Cancer Screening:        10/8/2021    11:05 AM   Breast CA Risk Assessment (FHS-7)   Do you have a family history of breast, colon, or ovarian cancer? No / Unknown         Mammogram Screening - Offered annual screening and updated Health Maintenance for mutual plan based on risk factor consideration  Pertinent mammograms are reviewed under the imaging tab.    History of abnormal Pap smear: NO - age 30-65 PAP every 5 years with negative HPV co-testing recommended      Latest Ref Rng & Units 7/30/2019    10:23 AM 7/30/2019     9:21 AM 8/9/2016    10:11 AM   PAP / HPV   PAP (Historical)   NIL     HPV 16 DNA NEG^Negative Negative   Negative    HPV 18 DNA NEG^Negative Negative   Negative    Other HR HPV NEG^Negative Negative   Negative      Reviewed and updated as needed this visit by clinical staff   Tobacco  Allergies  Meds  Problems  Med Hx  Surg Hx  Fam Hx          Reviewed and updated as needed this visit by Provider   Tobacco  Allergies  Meds  Problems  Med Hx  Surg Hx  Fam Hx             Review of Systems   Constitutional:  Negative for chills and fever.   HENT:  Positive for congestion. Negative for ear pain, hearing loss and sore throat.    Eyes:  Negative for pain and visual disturbance.   Respiratory:  Positive for cough. Negative for shortness of breath.    Cardiovascular:  Negative for chest pain, palpitations and  "peripheral edema.   Gastrointestinal:  Negative for abdominal pain, constipation, diarrhea, heartburn, hematochezia and nausea.   Breasts:  Negative for tenderness, breast mass and discharge.   Genitourinary:  Negative for dysuria, frequency, genital sores, hematuria, pelvic pain, urgency, vaginal bleeding and vaginal discharge.   Musculoskeletal:  Negative for arthralgias, joint swelling and myalgias.   Skin:  Negative for rash.   Neurological:  Negative for dizziness, weakness, headaches and paresthesias.   Psychiatric/Behavioral:  Negative for mood changes. The patient is not nervous/anxious.           OBJECTIVE:   BP (!) 136/92 (BP Location: Right arm, Patient Position: Sitting, Cuff Size: Adult Large)   Pulse 69   Temp 97.3  F (36.3  C) (Temporal)   Resp 16   Ht 1.575 m (5' 2\")   Wt 112.4 kg (247 lb 12.8 oz)   LMP  (LMP Unknown)   SpO2 100%   Breastfeeding No   BMI 45.32 kg/m    Physical Exam  GENERAL: alert and no distress  EYES: Eyes grossly normal to inspection, PERRL and conjunctivae and sclerae normal  HENT: ear canals and TM's normal, nose and mouth without ulcers or lesions  NECK: no adenopathy, no asymmetry, masses, or scars and thyroid normal to palpation  RESP: lungs clear to auscultation - no rales, rhonchi or wheezes  BREAST: normal without masses, tenderness or nipple discharge and no palpable axillary masses or adenopathy  CV: regular rate and rhythm, normal S1 S2, no S3 or S4, no murmur, click or rub, no peripheral edema and peripheral pulses strong  ABDOMEN: soft, nontender, no hepatosplenomegaly, no masses and bowel sounds normal   (female): normal female external genitalia, normal urethral meatus, vaginal mucosa pink, moist, well rugated, and normal cervix/adnexa/uterus without masses or discharge  MS: no gross musculoskeletal defects noted, no edema  SKIN: no suspicious lesions or rashes  NEURO: Normal strength and tone, mentation intact and speech normal  PSYCH: mentation appears " "normal, affect normal/bright    Diagnostic Test Results:  Labs reviewed in Epic    ASSESSMENT/PLAN:   (Z00.00) Routine general medical examination at a health care facility  (primary encounter diagnosis)  Comment:    Plan: Lipid panel reflex to direct LDL Fasting,         Comprehensive metabolic panel (BMP + Alb, Alk         Phos, ALT, AST, Total. Bili, TP), CBC with         platelets             (Z12.31) Encounter for screening mammogram for breast cancer  Comment:    Plan: *MA Screening Digital Bilateral             (Z12.4) Screening for cervical cancer  Comment:    Plan: Pap screen with HPV - recommended age 30 - 65         years             (R03.0) Elevated blood pressure reading without diagnosis of hypertension  Comment:  reviewed BP monitoring and will have her try to get machine to monitor her BP at home -   Discussed normal ranges  Plan: Home Blood Pressure Monitor Order for DME -         ONLY FOR DME                   COUNSELING:  Reviewed preventive health counseling, as reflected in patient instructions      BMI:   Estimated body mass index is 45.32 kg/m  as calculated from the following:    Height as of this encounter: 1.575 m (5' 2\").    Weight as of this encounter: 112.4 kg (247 lb 12.8 oz).   Weight management plan: Discussed healthy diet and exercise guidelines      She reports that she has never smoked. She has never used smokeless tobacco.          Naida Elliott, [facility]  "

## 2023-11-08 NOTE — RESULT ENCOUNTER NOTE
Dear Ramya,   Your test results are all back -   -Normal red blood cell (hgb) levels, normal white blood cell count and normal platelet levels.  -Cholesterol levels (LDL,HDL, Triglycerides) are improved!!!   ADVISE: rechecking in 1 year.   -Liver and gallbladder tests are normal (ALT,AST, Alk phos, bilirubin), kidney function is normal (Cr, GFR), sodium is normal, potassium is normal, calcium is normal, glucose is normal.  Let us know if you have any questions.  -Naida Elliott, DO

## 2023-11-10 LAB
BKR LAB AP GYN ADEQUACY: NORMAL
BKR LAB AP GYN INTERPRETATION: NORMAL
BKR LAB AP HPV REFLEX: NORMAL
BKR LAB AP PREVIOUS ABNORMAL: NORMAL
PATH REPORT.COMMENTS IMP SPEC: NORMAL
PATH REPORT.COMMENTS IMP SPEC: NORMAL
PATH REPORT.RELEVANT HX SPEC: NORMAL

## 2023-11-13 LAB
HUMAN PAPILLOMA VIRUS 16 DNA: NEGATIVE
HUMAN PAPILLOMA VIRUS 18 DNA: NEGATIVE
HUMAN PAPILLOMA VIRUS FINAL DIAGNOSIS: NORMAL
HUMAN PAPILLOMA VIRUS OTHER HR: NEGATIVE

## 2023-12-11 ENCOUNTER — ANCILLARY PROCEDURE (OUTPATIENT)
Dept: MAMMOGRAPHY | Facility: CLINIC | Age: 43
End: 2023-12-11
Attending: FAMILY MEDICINE
Payer: COMMERCIAL

## 2023-12-11 DIAGNOSIS — Z12.31 ENCOUNTER FOR SCREENING MAMMOGRAM FOR BREAST CANCER: ICD-10-CM

## 2023-12-11 PROCEDURE — 77067 SCR MAMMO BI INCL CAD: CPT

## 2023-12-11 PROCEDURE — 77063 BREAST TOMOSYNTHESIS BI: CPT | Mod: 26 | Performed by: RADIOLOGY

## 2023-12-11 PROCEDURE — 77067 SCR MAMMO BI INCL CAD: CPT | Mod: 26 | Performed by: RADIOLOGY

## 2024-11-20 ENCOUNTER — OFFICE VISIT (OUTPATIENT)
Dept: FAMILY MEDICINE | Facility: CLINIC | Age: 44
End: 2024-11-20
Attending: FAMILY MEDICINE
Payer: COMMERCIAL

## 2024-11-20 VITALS
WEIGHT: 256 LBS | SYSTOLIC BLOOD PRESSURE: 142 MMHG | OXYGEN SATURATION: 99 % | HEART RATE: 78 BPM | RESPIRATION RATE: 18 BRPM | DIASTOLIC BLOOD PRESSURE: 85 MMHG | TEMPERATURE: 97.9 F | HEIGHT: 62 IN | BODY MASS INDEX: 47.11 KG/M2

## 2024-11-20 DIAGNOSIS — Z12.31 VISIT FOR SCREENING MAMMOGRAM: ICD-10-CM

## 2024-11-20 DIAGNOSIS — E66.01 MORBID OBESITY (H): ICD-10-CM

## 2024-11-20 DIAGNOSIS — D17.30 LIPOMA OF SKIN AND SUBCUTANEOUS TISSUE: ICD-10-CM

## 2024-11-20 DIAGNOSIS — Z00.00 ROUTINE GENERAL MEDICAL EXAMINATION AT A HEALTH CARE FACILITY: Primary | ICD-10-CM

## 2024-11-20 DIAGNOSIS — I10 ESSENTIAL HYPERTENSION: ICD-10-CM

## 2024-11-20 LAB
ERYTHROCYTE [DISTWIDTH] IN BLOOD BY AUTOMATED COUNT: 13.7 % (ref 10–15)
HCT VFR BLD AUTO: 39 % (ref 35–47)
HGB BLD-MCNC: 12.3 G/DL (ref 11.7–15.7)
MCH RBC QN AUTO: 25.8 PG (ref 26.5–33)
MCHC RBC AUTO-ENTMCNC: 31.5 G/DL (ref 31.5–36.5)
MCV RBC AUTO: 82 FL (ref 78–100)
PLATELET # BLD AUTO: 312 10E3/UL (ref 150–450)
RBC # BLD AUTO: 4.76 10E6/UL (ref 3.8–5.2)
WBC # BLD AUTO: 9.3 10E3/UL (ref 4–11)

## 2024-11-20 RX ORDER — TRIAMTERENE AND HYDROCHLOROTHIAZIDE 37.5; 25 MG/1; MG/1
1 CAPSULE ORAL EVERY MORNING
Qty: 30 CAPSULE | Refills: 1 | Status: SHIPPED | OUTPATIENT
Start: 2024-11-20

## 2024-11-20 SDOH — HEALTH STABILITY: PHYSICAL HEALTH: ON AVERAGE, HOW MANY DAYS PER WEEK DO YOU ENGAGE IN MODERATE TO STRENUOUS EXERCISE (LIKE A BRISK WALK)?: 1 DAY

## 2024-11-20 SDOH — HEALTH STABILITY: PHYSICAL HEALTH: ON AVERAGE, HOW MANY MINUTES DO YOU ENGAGE IN EXERCISE AT THIS LEVEL?: 20 MIN

## 2024-11-20 ASSESSMENT — PATIENT HEALTH QUESTIONNAIRE - PHQ9
10. IF YOU CHECKED OFF ANY PROBLEMS, HOW DIFFICULT HAVE THESE PROBLEMS MADE IT FOR YOU TO DO YOUR WORK, TAKE CARE OF THINGS AT HOME, OR GET ALONG WITH OTHER PEOPLE: SOMEWHAT DIFFICULT
SUM OF ALL RESPONSES TO PHQ QUESTIONS 1-9: 7
SUM OF ALL RESPONSES TO PHQ QUESTIONS 1-9: 7

## 2024-11-20 ASSESSMENT — SOCIAL DETERMINANTS OF HEALTH (SDOH): HOW OFTEN DO YOU GET TOGETHER WITH FRIENDS OR RELATIVES?: ONCE A WEEK

## 2024-11-20 ASSESSMENT — ANXIETY QUESTIONNAIRES
8. IF YOU CHECKED OFF ANY PROBLEMS, HOW DIFFICULT HAVE THESE MADE IT FOR YOU TO DO YOUR WORK, TAKE CARE OF THINGS AT HOME, OR GET ALONG WITH OTHER PEOPLE?: SOMEWHAT DIFFICULT
GAD7 TOTAL SCORE: 7
GAD7 TOTAL SCORE: 7
7. FEELING AFRAID AS IF SOMETHING AWFUL MIGHT HAPPEN: SEVERAL DAYS
1. FEELING NERVOUS, ANXIOUS, OR ON EDGE: SEVERAL DAYS
6. BECOMING EASILY ANNOYED OR IRRITABLE: MORE THAN HALF THE DAYS
2. NOT BEING ABLE TO STOP OR CONTROL WORRYING: SEVERAL DAYS
5. BEING SO RESTLESS THAT IT IS HARD TO SIT STILL: NOT AT ALL
7. FEELING AFRAID AS IF SOMETHING AWFUL MIGHT HAPPEN: SEVERAL DAYS
GAD7 TOTAL SCORE: 7
IF YOU CHECKED OFF ANY PROBLEMS ON THIS QUESTIONNAIRE, HOW DIFFICULT HAVE THESE PROBLEMS MADE IT FOR YOU TO DO YOUR WORK, TAKE CARE OF THINGS AT HOME, OR GET ALONG WITH OTHER PEOPLE: SOMEWHAT DIFFICULT
4. TROUBLE RELAXING: SEVERAL DAYS
3. WORRYING TOO MUCH ABOUT DIFFERENT THINGS: SEVERAL DAYS

## 2024-11-20 NOTE — PATIENT INSTRUCTIONS
Patient Education   Preventive Care Advice   This is general advice given by our system to help you stay healthy. However, your care team may have specific advice just for you. Please talk to your care team about your preventive care needs.  Nutrition  Eat 5 or more servings of fruits and vegetables each day.  Try wheat bread, brown rice and whole grain pasta (instead of white bread, rice, and pasta).  Get enough calcium and vitamin D. Check the label on foods and aim for 100% of the RDA (recommended daily allowance).  Lifestyle  Exercise at least 150 minutes each week  (30 minutes a day, 5 days a week).  Do muscle strengthening activities 2 days a week. These help control your weight and prevent disease.  No smoking.  Wear sunscreen to prevent skin cancer.  Have a dental exam and cleaning every 6 months.  Yearly exams  See your health care team every year to talk about:  Any changes in your health.  Any medicines your care team has prescribed.  Preventive care, family planning, and ways to prevent chronic diseases.  Shots (vaccines)   HPV shots (up to age 26), if you've never had them before.  Hepatitis B shots (up to age 59), if you've never had them before.  COVID-19 shot: Get this shot when it's due.  Flu shot: Get a flu shot every year.  Tetanus shot: Get a tetanus shot every 10 years.  Pneumococcal, hepatitis A, and RSV shots: Ask your care team if you need these based on your risk.  Shingles shot (for age 50 and up)  General health tests  Diabetes screening:  Starting at age 35, Get screened for diabetes at least every 3 years.  If you are younger than age 35, ask your care team if you should be screened for diabetes.  Cholesterol test: At age 39, start having a cholesterol test every 5 years, or more often if advised.  Bone density scan (DEXA): At age 50, ask your care team if you should have this scan for osteoporosis (brittle bones).  Hepatitis C: Get tested at least once in your life.  STIs (sexually  transmitted infections)  Before age 24: Ask your care team if you should be screened for STIs.  After age 24: Get screened for STIs if you're at risk. You are at risk for STIs (including HIV) if:  You are sexually active with more than one person.  You don't use condoms every time.  You or a partner was diagnosed with a sexually transmitted infection.  If you are at risk for HIV, ask about PrEP medicine to prevent HIV.  Get tested for HIV at least once in your life, whether you are at risk for HIV or not.  Cancer screening tests  Cervical cancer screening: If you have a cervix, begin getting regular cervical cancer screening tests starting at age 21.  Breast cancer scan (mammogram): If you've ever had breasts, begin having regular mammograms starting at age 40. This is a scan to check for breast cancer.  Colon cancer screening: It is important to start screening for colon cancer at age 45.  Have a colonoscopy test every 10 years (or more often if you're at risk) Or, ask your provider about stool tests like a FIT test every year or Cologuard test every 3 years.  To learn more about your testing options, visit:   .  For help making a decision, visit:   https://bit.ly/kv44074.  Prostate cancer screening test: If you have a prostate, ask your care team if a prostate cancer screening test (PSA) at age 55 is right for you.  Lung cancer screening: If you are a current or former smoker ages 50 to 80, ask your care team if ongoing lung cancer screenings are right for you.  For informational purposes only. Not to replace the advice of your health care provider. Copyright   2023 [facility]. All rights reserved. Clinically reviewed by the [facility]. [employer] [id number] - REV 01/24.  Learning About Stress  What is stress?     Stress is your body's response to a hard situation. Your body can have a physical, emotional, or mental response. Stress is a fact of life for most people, and it  affects everyone differently. What causes stress for you may not be stressful for someone else.  A lot of things can cause stress. You may feel stress when you go on a job interview, take a test, or run a race. This kind of short-term stress is normal and even useful. It can help you if you need to work hard or react quickly. For example, stress can help you finish an important job on time.  Long-term stress is caused by ongoing stressful situations or events. Examples of long-term stress include long-term health problems, ongoing problems at work, or conflicts in your family. Long-term stress can harm your health.  How does stress affect your health?  When you are stressed, your body responds as though you are in danger. It makes hormones that speed up your heart, make you breathe faster, and give you a burst of energy. This is called the fight-or-flight stress response. If the stress is over quickly, your body goes back to normal and no harm is done.  But if stress happens too often or lasts too long, it can have bad effects. Long-term stress can make you more likely to get sick, and it can make symptoms of some diseases worse. If you tense up when you are stressed, you may develop neck, shoulder, or low back pain. Stress is linked to high blood pressure and heart disease.  Stress also harms your emotional health. It can make you garner, tense, or depressed. Your relationships may suffer, and you may not do well at work or school.  What can you do to manage stress?  You can try these things to help manage stress:   Do something active. Exercise or activity can help reduce stress. Walking is a great way to get started. Even everyday activities such as housecleaning or yard work can help.  Try yoga or margo chi. These techniques combine exercise and meditation. You may need some training at first to learn them.  Do something you enjoy. For example, listen to music or go to a movie. Practice your hobby or do volunteer  "work.  Meditate. This can help you relax, because you are not worrying about what happened before or what may happen in the future.  Do guided imagery. Imagine yourself in any setting that helps you feel calm. You can use online videos, books, or a teacher to guide you.  Do breathing exercises. For example:  From a standing position, bend forward from the waist with your knees slightly bent. Let your arms dangle close to the floor.  Breathe in slowly and deeply as you return to a standing position. Roll up slowly and lift your head last.  Hold your breath for just a few seconds in the standing position.  Breathe out slowly and bend forward from the waist.  Let your feelings out. Talk, laugh, cry, and express anger when you need to. Talking with supportive friends or family, a counselor, or a christopher leader about your feelings is a healthy way to relieve stress. Avoid discussing your feelings with people who make you feel worse.  Write. It may help to write about things that are bothering you. This helps you find out how much stress you feel and what is causing it. When you know this, you can find better ways to cope.  What can you do to prevent stress?  You might try some of these things to help prevent stress:  Manage your time. This helps you find time to do the things you want and need to do.  Get enough sleep. Your body recovers from the stresses of the day while you are sleeping.  Get support. Your family, friends, and community can make a difference in how you experience stress.  Limit your news feed. Avoid or limit time on social media or news that may make you feel stressed.  Do something active. Exercise or activity can help reduce stress. Walking is a great way to get started.  Where can you learn more?  Go to https://www.[employer].net/patiented  Enter N032 in the search box to learn more about \"Learning About Stress.\"  Current as of: October 24, 2023  Content Version: 14.2 2024 [employer]. "   Care instructions adapted under license by your healthcare professional. If you have questions about a medical condition or this instruction, always ask your healthcare professional. Healthwise, [facility] disclaims any warranty or liability for your use of this information.    Learning About Depression Screening  What is depression screening?  Depression screening is a way to see if you have depression symptoms. It may be done by a doctor or counselor. It's often part of a routine checkup. That's because your mental health is just as important as your physical health.  Depression is a mental health condition that affects how you feel, think, and act. You may:  Have less energy.  Lose interest in your daily activities.  Feel sad and grouchy for a long time.  Depression is very common. It affects people of all ages.  Many things can lead to depression. Some people become depressed after they have a stroke or find out they have a major illness like cancer or heart disease. The death of a loved one or a breakup may lead to depression. It can run in families. Most experts believe that a combination of inherited genes and stressful life events can cause it.  What happens during screening?  You may be asked to fill out a form about your depression symptoms. You and the doctor will discuss your answers. The doctor may ask you more questions to learn more about how you think, act, and feel.  What happens after screening?  If you have symptoms of depression, your doctor will talk to you about your options.  Doctors usually treat depression with medicines or counseling. Often, combining the two works best. Many people don't get help because they think that they'll get over the depression on their own. But people with depression may not get better unless they get treatment.  The cause of depression is not well understood. There may be many factors involved. But if you have depression, it's not your fault.  A serious  "symptom of depression is thinking about death or suicide. If you or someone you care about talks about this or about feeling hopeless, get help right away.  It's important to know that depression can be treated. Medicine, counseling, and self-care may help.  Where can you learn more?  Go to https://www.[employer].net/patiented  Enter T185 in the search box to learn more about \"Learning About Depression Screening.\"  Current as of: June 24, 2023  Content Version: 14.2 2024 [facility] [employer] [facility].   Care instructions adapted under license by your healthcare professional. If you have questions about a medical condition or this instruction, always ask your healthcare professional. [employer] disclaims any warranty or liability for your use of this information.       "

## 2024-11-20 NOTE — PROGRESS NOTES
"Preventive Care Visit  [facility]  Naida Elliott DO, Family Medicine  Nov 20, 2024      Assessment & Plan     Routine general medical examination at a health care facility     - Lipid panel reflex to direct LDL Fasting; Future  - Comprehensive metabolic panel (BMP + Alb, Alk Phos, ALT, AST, Total. Bili, TP); Future  - CBC with platelets; Future    Lipoma of skin and subcutaneous tissue  Lipoma for a few years but seems to be getting larger - irritated by bra  - Adult Gen Surg  Referral; Future    Visit for screening mammogram     - MA Screening Bilateral w/ Justin; Future    Essential hypertension  BP too high today - recheck is 160/100  Will start diuretic   Follow-up one month   - triamterene-HCTZ (DYAZIDE) 37.5-25 MG capsule; Take 1 capsule by mouth every morning.    Morbid obesity (H)               BMI  Estimated body mass index is 46.82 kg/m  as calculated from the following:    Height as of this encounter: 1.575 m (5' 2\").    Weight as of this encounter: 116.1 kg (256 lb).   Weight management plan: Discussed healthy diet and exercise guidelines    Counseling  Appropriate preventive services were addressed with this patient via screening, questionnaire, or discussion as appropriate for fall prevention, nutrition, physical activity, Tobacco-use cessation, social engagement, weight loss and cognition.  Checklist reviewing preventive services available has been given to the patient.  Reviewed patient's diet, addressing concerns and/or questions.   She is at risk for lack of exercise and has been provided with information to increase physical activity for the benefit of her well-being.   She is at risk for psychosocial distress and has been provided with information to reduce risk.   The patient's PHQ-9 score is consistent with mild depression. She was provided with information regarding depression.           Zay Bui is a 44 year old, presenting for the " following:  Physical        11/20/2024    10:34 AM   Additional Questions   Roomed by Jacqueline HOPKINS MA   Accompanied by self         11/20/2024    10:34 AM   Patient Reported Additional Medications   Patient reports taking the following new medications none          HPI      Health Care Directive  Patient does not have a Health Care Directive:       11/20/2024   General Health   How would you rate your overall physical health? (!) FAIR   Feel stress (tense, anxious, or unable to sleep) To some extent      (!) STRESS CONCERN      11/20/2024   Nutrition   Three or more servings of calcium each day? (!) I DON'T KNOW   Diet: Regular (no restrictions)   How many servings of fruit and vegetables per day? (!) 2-3   How many sweetened beverages each day? (!) 2            11/20/2024   Exercise   Days per week of moderate/strenous exercise 1 day   Average minutes spent exercising at this level 20 min      (!) EXERCISE CONCERN      11/20/2024   Social Factors   Frequency of gathering with friends or relatives Once a week   Worry food won't last until get money to buy more No   Food not last or not have enough money for food? No   Do you have housing? (Housing is defined as stable permanent housing and does not include staying ouside in a car, in a tent, in an abandoned building, in an overnight shelter, or couch-surfing.) Yes   Are you worried about losing your housing? No   Lack of transportation? No   Unable to get utilities (heat,electricity)? No            11/20/2024   Dental   Dentist two times every year? Yes            11/20/2024   TB Screening   Were you born outside of the [place]? No          Today's PHQ-9 Score:       11/20/2024     9:19 AM   PHQ-9 SCORE   PHQ-9 Total Score MyChart 7 (Mild depression)   PHQ-9 Total Score 7        Patient-reported         11/20/2024   Substance Use   Alcohol more than 3/day or more than 7/wk No   Do you use any other substances recreationally? No        Social History     Tobacco Use    Smoking  status: Never    Smokeless tobacco: Never   Vaping Use    Vaping status: Never Used   Substance Use Topics    Alcohol use: Yes     Comment: 2 times or less per week    Drug use: Never           2023   LAST FHS-7 RESULTS   1st degree relative breast or ovarian cancer No   Any relative bilateral breast cancer No   Any male have breast cancer No   Any ONE woman have BOTH breast AND ovarian cancer No   Any woman with breast cancer before 50yrs No   2 or more relatives with breast AND/OR ovarian cancer No   2 or more relatives with breast AND/OR bowel cancer No           Mammogram Screening - Mammogram every 1-2 years updated in Health Maintenance based on mutual decision making        2024   STI Screening   New sexual partner(s) since last STI/HIV test? No        History of abnormal Pap smear: No - age 30- 64 PAP with HPV every 5 years recommended        Latest Ref Rng & Units 2023    11:18 AM 2019    10:23 AM 2019     9:21 AM   PAP / HPV   PAP  Negative for Intraepithelial Lesion or Malignancy (NILM)      PAP (Historical)    NIL    HPV 16 DNA Negative Negative  Negative     HPV 18 DNA Negative Negative  Negative     Other HR HPV Negative Negative  Negative       ASCVD Risk   The ASCVD Risk score (Rob BACA, et al., 2019) failed to calculate for the following reasons:    The systolic blood pressure is missing        2024   Contraception/Family Planning   Questions about contraception or family planning No           Reviewed and updated as needed this visit by Provider                    Patient Active Problem List   Diagnosis    Morbid obesity (H)     Past Surgical History:   Procedure Laterality Date     SECTION N/A 2014    Procedure:  SECTION;  Surgeon: Maribel Nixon MD;  Location: [facility] L+[facility]     SECTION N/A 2017    Procedure:  SECTION;  Surgeon: Diane Anderson MD;  Location: [facility] L+[facility]     SECTION N/A 2020    Procedure:  "REPEAT  SECTION;  Surgeon: Yadi Ricci MD;  [facility]      benign       Social History     Tobacco Use    Smoking status: Never    Smokeless tobacco: Never   Substance Use Topics    Alcohol use: Yes     Comment: 2 times or less per week     Family History   Problem Relation Age of Onset    Heart Disease Paternal Grandfather     Heart Disease Maternal Grandfather     Allergies Mother     Eye Disorder Mother     Allergies Maternal Grandmother     Cerebrovascular Disease Maternal Grandmother 92    Depression Maternal Uncle              Review of Systems  Constitutional, HEENT, cardiovascular, pulmonary, GI, , musculoskeletal, neuro, skin, endocrine and psych systems are negative, except as otherwise noted.     Objective    Exam  There were no vitals taken for this visit.   Estimated body mass index is 45.32 kg/m  as calculated from the following:    Height as of 23: 1.575 m (5' 2\").    Weight as of 23: 112.4 kg (247 lb 12.8 oz).    Physical Exam  GENERAL: alert and no distress  EYES: Eyes grossly normal to inspection, PERRL and conjunctivae and sclerae normal  HENT: ear canals and TM's normal, nose and mouth without ulcers or lesions  NECK: no adenopathy, no asymmetry, masses, or scars  RESP: lungs clear to auscultation - no rales, rhonchi or wheezes  BREAST: normal without masses, tenderness or nipple discharge and no palpable axillary masses or adenopathy  CV: regular rate and rhythm, normal S1 S2, no S3 or S4, no murmur, click or rub, no peripheral edema  ABDOMEN: soft, nontender, no hepatosplenomegaly, no masses and bowel sounds normal  MS: no gross musculoskeletal defects noted, no edema  SKIN: large lipoma in the lateral to posterior axilla area  NEURO: Normal strength and tone, mentation intact and speech normal  PSYCH: mentation appears normal, affect normal/bright        Signed Electronically by: Naida Elliott, DO    Answers " submitted by the patient for this visit:  Patient Health Questionnaire (Submitted on 11/20/2024)  If you checked off any problems, how difficult have these problems made it for you to do your work, take care of things at home, or get along with other people?: Somewhat difficult  PHQ9 TOTAL SCORE: 7  Patient Health Questionnaire (G7) (Submitted on 11/20/2024)  ERIKA 7 TOTAL SCORE: 7

## 2024-11-21 LAB
ALBUMIN SERPL BCG-MCNC: 4.1 G/DL (ref 3.5–5.2)
ALP SERPL-CCNC: 65 U/L (ref 40–150)
ALT SERPL W P-5'-P-CCNC: 16 U/L (ref 0–50)
ANION GAP SERPL CALCULATED.3IONS-SCNC: 10 MMOL/L (ref 7–15)
AST SERPL W P-5'-P-CCNC: 20 U/L (ref 0–45)
BILIRUB SERPL-MCNC: 0.2 MG/DL
BUN SERPL-MCNC: 11.1 MG/DL (ref 6–20)
CALCIUM SERPL-MCNC: 9.3 MG/DL (ref 8.8–10.4)
CHLORIDE SERPL-SCNC: 108 MMOL/L (ref 98–107)
CHOLEST SERPL-MCNC: 204 MG/DL
CREAT SERPL-MCNC: 0.76 MG/DL (ref 0.51–0.95)
EGFRCR SERPLBLD CKD-EPI 2021: >90 ML/MIN/1.73M2
FASTING STATUS PATIENT QL REPORTED: YES
FASTING STATUS PATIENT QL REPORTED: YES
GLUCOSE SERPL-MCNC: 94 MG/DL (ref 70–99)
HCO3 SERPL-SCNC: 23 MMOL/L (ref 22–29)
HDLC SERPL-MCNC: 65 MG/DL
LDLC SERPL CALC-MCNC: 116 MG/DL
NONHDLC SERPL-MCNC: 139 MG/DL
POTASSIUM SERPL-SCNC: 4.4 MMOL/L (ref 3.4–5.3)
PROT SERPL-MCNC: 7.1 G/DL (ref 6.4–8.3)
SODIUM SERPL-SCNC: 141 MMOL/L (ref 135–145)
TRIGL SERPL-MCNC: 113 MG/DL

## 2024-12-12 ENCOUNTER — HOSPITAL ENCOUNTER (OUTPATIENT)
Dept: MAMMOGRAPHY | Facility: CLINIC | Age: 44
Discharge: HOME | End: 2024-12-12
Attending: FAMILY MEDICINE
Payer: COMMERCIAL

## 2024-12-12 DIAGNOSIS — Z12.31 VISIT FOR SCREENING MAMMOGRAM: ICD-10-CM

## 2024-12-12 PROCEDURE — 77063 BREAST TOMOSYNTHESIS BI: CPT

## 2024-12-12 PROCEDURE — 77067 SCR MAMMO BI INCL CAD: CPT

## 2024-12-16 ENCOUNTER — HOSPITAL ENCOUNTER (OUTPATIENT)
Dept: MAMMOGRAPHY | Facility: CLINIC | Age: 44
Discharge: HOME | End: 2024-12-16
Attending: FAMILY MEDICINE
Payer: COMMERCIAL

## 2024-12-16 DIAGNOSIS — R92.8 ABNORMAL MAMMOGRAM: ICD-10-CM

## 2024-12-16 PROCEDURE — 77065 DX MAMMO INCL CAD UNI: CPT | Mod: RT

## 2024-12-31 PROBLEM — D05.11 BREAST NEOPLASM, TIS (DCIS), RIGHT: Status: ACTIVE | Noted: 2024-12-31

## 2025-01-02 NOTE — TELEPHONE ENCOUNTER
RECORDS STATUS - BREAST    RECORDS REQUESTED FROM: [facility]   NOTES DETAILS STATUS   OFFICE NOTE from referring provider Epic 12/20/24: Dr. Naida Elliott   OPERATIVE REPORT [facility] 12/26/24: MA Breast Bx   MEDICATION LIST [facility]    LABS     PATHOLOGY REPORTS  (Tissue diagnosis, Stage, ER/NV percentage positive and intensity of staining, HER2 IHC, FISH, and all biopsies from breast and any distant metastasis)                 Report in Epic 12/26/24: [id number]    IMAGING (NEED IMAGES & REPORT)     MAMMO PACS 12/26/24-3/26/21  12/26/24: MA Breast Bx

## 2025-01-05 NOTE — PROGRESS NOTES
[facility]    Hematology/Oncology New Patient Note      Today's Date: 25    Reason for visit: Right breast DCIS.    HISTORY OF PRESENT ILLNESS: Ramya Dale is a 44 year old female with history of hypertension who presents with the following oncologic history:  1.  2024: Screening mammogram showed calcifications in right breast.  Left breast negative.  2.  2024: Diagnostic right mammogram showed punctate calcifications in segmental distribution in right lower inner breast, middle depth, measuring 3.9 x 2.9 x 1.8 cm.  3.  2024: Right breast biopsy at 3:00, 5 cm from nipple showed grade 2 DCIS with cancerization of lobules associated with microcalcifications, negative for invasive malignancy, ER strongly positive at %, LA strongly positive at %.    Ramya reports she did not feel any right breast symptoms prior to her diagnosis.  Denies bone pain.      REVIEW OF SYSTEMS:   14 point ROS was reviewed and is negative other than as noted above in HPI.       HOME MEDICATIONS:  Current Outpatient Medications   Medication Sig Dispense Refill    triamterene-HCTZ (DYAZIDE) 37.5-25 MG capsule Take 1 capsule by mouth every morning. 90 capsule 3         ALLERGIES:  Allergies   Allergen Reactions    Cats     Pollen Extract      Ragweed - Fall  Usually also bothered for a week in the spring.    Codeine Nausea         PAST MEDICAL HISTORY:  Past Medical History:   Diagnosis Date    Abnormal Pap smear         LSIL (low grade squamous intraepithelial lesion) on Pap smear 2011    colp - WNl     Gynecologic history:  , age of 1st pregnancy at 34,  her children; no HRT use; used OCPs for 8-9 years.    PAST SURGICAL HISTORY:  Past Surgical History:   Procedure Laterality Date     SECTION N/A 2014    Procedure:  SECTION;  Surgeon: Maribel Nixon MD;  Location: [facility]     SECTION N/A 2017    Procedure:  SECTION;  Surgeon:  Diane Anderson MD;  Location: [facility]     SECTION N/A 2020    Procedure: REPEAT  SECTION;  Surgeon: Yadi Ricci MD;  Location: [facility] L+[facility]    ZZHC EXC LESION OF TONGUE W/O CLOSURE      benign         SOCIAL HISTORY:  Social History     Socioeconomic History    Marital status:      Spouse name: Not on file    Number of children: 0    Years of education: Not on file    Highest education level: Not on file   Occupational History    Occupation: retail     Employer: [employer]     Employer: [employer]   Tobacco Use    Smoking status: Never    Smokeless tobacco: Never   Vaping Use    Vaping status: Never Used   Substance and Sexual Activity    Alcohol use: Yes     Comment: 2 times or less per week    Drug use: Never    Sexual activity: Yes     Partners: Male     Birth control/protection: Male Surgical   Other Topics Concern    Parent/sibling w/ CABG, MI or angioplasty before 65F 55M? No   Social History Narrative    Social Documentation:5/14/10        Balanced Diet: YES, could be better    Calcium intake: 1-2 per day    Caffeine: 0-1 per day    Exercise:  type of activity skate, aerobics;  5 times per week    Sunscreen: sometimes    Seatbelts:  Yes    Self Breast Exam:  Yes sometime    Self Testicular Exam: No    Physical/Emotional/Sexual Abuse: No    Do you feel safe in your environment? Yes        Cholesterol screen up to date: No     Eye Exam up to date: Yes    Dental Exam up to date: No    Pap smear up to date: Yes, PAP      NIL   2009    Mammogram up to date: Does Not Apply    Dexa Scan up to date: Does Not Apply    Colonoscopy up to date: Does Not Apply    Immunizations up to date: last td 3/17/2008    Glucose screen if over 40:  N/a        Ly [facility]                      Social Drivers of Health     Financial Resource Strain: Low Risk  (2024)    Financial Resource Strain     Within the past 12 months, have you or your family members you  live with been unable to get utilities (heat, electricity) when it was really needed?: No   Food Insecurity: Low Risk  (11/20/2024)    Food Insecurity     Within the past 12 months, did you worry that your food would run out before you got money to buy more?: No     Within the past 12 months, did the food you bought just not last and you didn t have money to get more?: No   Transportation Needs: Low Risk  (11/20/2024)    Transportation Needs     Within the past 12 months, has lack of transportation kept you from medical appointments, getting your medicines, non-medical meetings or appointments, work, or from getting things that you need?: No   Physical Activity: Insufficiently Active (11/20/2024)    Exercise Vital Sign     Days of Exercise per Week: 1 day     Minutes of Exercise per Session: 20 min   Stress: Stress Concern Present (11/20/2024)    [employer] - Occupational Stress Questionnaire     Feeling of Stress : To some extent   Social Connections: Unknown (11/20/2024)    Social Connection and Isolation Panel [NHANES]     Frequency of Communication with Friends and Family: Not on file     Frequency of Social Gatherings with Friends and Family: Once a week     Attends [employer] Services: Not on file     Active Member of Clubs or Organizations: Not on file     Attends Club or Organization Meetings: Not on file     Marital Status: Not on file   Interpersonal Safety: Low Risk  (11/7/2023)    Interpersonal Safety     Do you feel physically and emotionally safe where you currently live?: Yes     Within the past 12 months, have you been hit, slapped, kicked or otherwise physically hurt by someone?: No     Within the past 12 months, have you been humiliated or emotionally abused in other ways by your partner or ex-partner?: No   Housing Stability: Low Risk  (11/20/2024)    Housing Stability     Do you have housing? : Yes     Are you worried about losing your housing?: No         FAMILY  "HISTORY:  Family History   Problem Relation Age of Onset    Heart Disease Paternal Grandfather     Heart Disease Maternal Grandfather     Allergies Mother     Eye Disorder Mother     Allergies Maternal Grandmother     Cerebrovascular Disease Maternal Grandmother 92    Depression Maternal Uncle          PHYSICAL EXAM:  Vital signs:  /80   Pulse 90   Temp 97.9  F (36.6  C) (Oral)   Resp 14   Ht 1.549 m (5' 1\")   Wt 108.9 kg (240 lb)   LMP 2024   SpO2 100%   BMI 45.35 kg/m     ECO  GENERAL/CONSTITUTIONAL: No acute distress.  EYES:  No scleral icterus.  ENT/MOUTH: Neck supple. Oropharynx grossly clear.  LYMPH: No cervical, supraclavicular, axillary adenopathy.   BREAST: Small 2-cm hematoma at site of right breast biopsy with ecchymosis. No palpable masses in either remaining breasts. Nipples are everted bilaterally with no discharge. No erythema, ulceration, or dimpling of the skin.   RESPIRATORY: No audible cough or wheezing.   GASTROINTESTINAL: No hepatosplenomegaly, masses, or tenderness.  No guarding.  No distention.  MUSCULOSKELETAL: Warm and well-perfused, no cyanosis, clubbing, or edema.  NEUROLOGIC: No focal motor deficits. Alert, oriented, answers questions appropriately.  INTEGUMENTARY: No rashes or jaundice.  GAIT: Steady, does not use assistive device      LABS:  CBC RESULTS:   Recent Labs   Lab Test 24  1127   WBC 9.3   RBC 4.76   HGB 12.3   HCT 39.0   MCV 82   MCH 25.8*   MCHC 31.5   RDW 13.7          Recent Labs   Lab Test 24  1148 24  1127    141   POTASSIUM 3.9 4.4   CHLORIDE 101 108*   CO2 24 23   ANIONGAP 14 10   GLC 96 94   BUN 12.9 11.1   CR 0.79 0.76   MARRY 9.8 9.3         PATHOLOGY:  Reviewed as per HPI.    IMAGING:  Reviewed as per HPI.    ASSESSMENT/PLAN:  Ramya Dale is a 44 year old female with the following issues:  1.  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  The area of abnormality measures approximately 3.8 cm.  - She has met with Dr. Yennifer Mix and plans to proceed with bilateral mastectomies.  -Discussed that if she ultimately proceeds with bilateral mastectomies and has negative surgical margins with no findings of invasive cancer, that no chemotherapy or hormone blockade therapy would be indicated.  -Given her young age, advised genetics consult and genetic testing.  She has consented to proceed with genetic testing with the Hereditary Breast Actionable Panel.  --She already had unremarkable CBC and CMP drawn in 11/2024 and will not need these repeated at this time.  --I answered questions she had regarding potential causes of breast cancer.    2. Hypertension  --Blood pressure stable.  Continue Dyazide.    Return 2-3 weeks after surgery.    Sydney Wray MD  [facility] Hematology/Oncology    Total time spent today: 60 minutes in chart review, patient evaluation, counseling, documentation, test and/or medication/prescription orders, and coordination of care.      The longitudinal plan of care for the diagnosis(es)/condition(s) as documented were addressed during this visit. Due to the added complexity in care, I will continue to support Ramya in the subsequent management and with ongoing continuity of care.

## 2025-01-06 NOTE — PROGRESS NOTES
[facility] Breast Surgery Consultation    HPI:   Ramya Dlae is a 44 year old female who is seen in consultation at the request of Dr. Elliott for evaluation of newly diagnosed right breast ductal carcinoma in situ, grade 2, ER % GA % at 3:00, 5cm FN measuring 3.9cm     She had a screening mammogram on 2024 which revealed new calcifications in the right medial breast. Diagnostic imaging revealed punctate calcifications in a segmental distribution measuring 3.9cm. she then underwent stereotactic biopsy.     She reports no breast concerns prior to her mammogram. No prior breast biopsies or surgeries. She has lipomas on her left lateral breast and left lateral chest wall.     She has had the lipomas/masses on her breast/chest wall for years. They are now more bothersome/tender.     Hormonal history:  menarche 11, 3 children, 1st at age 34,  pre menopausal, 8-9 years OCP use, not currently, no HRT, no fertility treatment.     Family history of breast cancer: No  Family history of ovarian cancer:  No  Family history of colon cancer: No  Family history of prostate cancer: Yes - maternal uncle      Past Medical History:   has a past medical history of Abnormal Pap smear and LSIL (low grade squamous intraepithelial lesion) on Pap smear (2011).      Current Outpatient Medications:     triamterene-HCTZ (DYAZIDE) 37.5-25 MG capsule, Take 1 capsule by mouth every morning., Disp: 90 capsule, Rfl: 3    Past Surgical History:  Past Surgical History:   Procedure Laterality Date     SECTION N/A 2014    Procedure:  SECTION;  Surgeon: Maribel Nixon MD;  [facility]     SECTION N/A 2017    Procedure:  SECTION;  Surgeon: Diane Anderson MD;  [facility]     SECTION N/A 2020    Procedure: REPEAT  SECTION;  Surgeon: Yadi Ricci MD;  Location: [facility]    ZZHC EXC LESION OF TONGUE W/O CLOSURE      benign            Allergies   Allergen Reactions    Cats     Pollen Extract      Ragweed - Fall  Usually also bothered for a week in the spring.    Codeine Nausea        Social History:  Social History     Socioeconomic History    Marital status:      Spouse name: Not on file    Number of children: 0    Years of education: Not on file    Highest education level: Not on file   Occupational History    Occupation: retail     Employer: [employer]     Employer: [employer]   Tobacco Use    Smoking status: Never    Smokeless tobacco: Never   Vaping Use    Vaping status: Never Used   Substance and Sexual Activity    Alcohol use: Yes     Comment: 2 times or less per week    Drug use: Never    Sexual activity: Yes     Partners: Male     Birth control/protection: Male Surgical   Other Topics Concern    Parent/sibling w/ CABG, MI or angioplasty before 65F 55M? No   Social History Narrative    Social Documentation:5/14/10        Balanced Diet: YES, could be better    Calcium intake: 1-2 per day    Caffeine: 0-1 per day    Exercise:  type of activity skate, aerobics;  5 times per week    Sunscreen: sometimes    Seatbelts:  Yes    Self Breast Exam:  Yes sometime    Self Testicular Exam: No    Physical/Emotional/Sexual Abuse: No    Do you feel safe in your environment? Yes        Cholesterol screen up to date: No     Eye Exam up to date: Yes    Dental Exam up to date: No    Pap smear up to date: Yes, PAP      NIL   4/8/2009    Mammogram up to date: Does Not Apply    Dexa Scan up to date: Does Not Apply    Colonoscopy up to date: Does Not Apply    Immunizations up to date: last td 3/17/2008    Glucose screen if over 40:  N/a        Ly Rubio [facility]                      Social Drivers of Health     Financial Resource Strain: Low Risk  (11/20/2024)    Financial Resource Strain     Within the past 12 months, have you or your family members you live with been unable to get utilities (heat, electricity) when it was really needed?: No    Food Insecurity: Low Risk  (11/20/2024)    Food Insecurity     Within the past 12 months, did you worry that your food would run out before you got money to buy more?: No     Within the past 12 months, did the food you bought just not last and you didn t have money to get more?: No   Transportation Needs: Low Risk  (11/20/2024)    Transportation Needs     Within the past 12 months, has lack of transportation kept you from medical appointments, getting your medicines, non-medical meetings or appointments, work, or from getting things that you need?: No   Physical Activity: Insufficiently Active (11/20/2024)    Exercise Vital Sign     Days of Exercise per Week: 1 day     Minutes of Exercise per Session: 20 min   Stress: Stress Concern Present (11/20/2024)    [employer] - Occupational Stress Questionnaire     Feeling of Stress : To some extent   Social Connections: Unknown (11/20/2024)    Social Connection and Isolation Panel [NHANES]     Frequency of Communication with Friends and Family: Not on file     Frequency of Social Gatherings with Friends and Family: Once a week     Attends [employer] Services: Not on file     Active Member of Clubs or Organizations: Not on file     Attends Club or Organization Meetings: Not on file     Marital Status: Not on file   Interpersonal Safety: Low Risk  (11/7/2023)    Interpersonal Safety     Do you feel physically and emotionally safe where you currently live?: Yes     Within the past 12 months, have you been hit, slapped, kicked or otherwise physically hurt by someone?: No     Within the past 12 months, have you been humiliated or emotionally abused in other ways by your partner or ex-partner?: No   Housing Stability: Low Risk  (11/20/2024)    Housing Stability     Do you have housing? : Yes     Are you worried about losing your housing?: No        ROS:  The 10 point review of systems is negative other than noted in the HPI and  "above.    PE:  Vitals: /80   Pulse 90   Temp 97.9  F (36.6  C) (Oral)   Resp 14   Ht 1.549 m (5' 1\")   Wt 108.9 kg (240 lb)   LMP 12/09/2024   SpO2 100%   BMI 45.35 kg/m    General appearance: well-nourished, sitting comfortably, no apparent distress  Psych: normal affect, pleasant  HEENT:  Head normocephalic and atraumatic, pupils equal and round, conjunctivae clear, mucous membranes moist, external ears and nose normal  Neck: Supple without thyromegaly or masses  Lungs: Respirations unlabored  Lymphatic: No cervical, or supraclavicular lymphadenopathy  Extremities: Without edema  Musculoskeletal:  Normal station and gait  Neurologic: nonfocal, grossly intact times four extremities, alert and oriented times three  Psychiatric: Mood and affect are appropriate  Skin: Without lesions or rashes    Breast:  A bilateral breast exam was performed in the supine position.. Bilateral breasts were palpated in a circumferential clockwise fashion including the supraclavicular and axillary areas.   Right breast is smaller than left. Skin changes related to steri strips and biopsy site on right medial/lower breast. There is a palpable 2-3cm hematoma on the right lower inner breast. Tissue is heterogeneously dense bilaterally. No masses in the left breast. Some dried discharge on right nipple.     Lymph:       No supraclavicular/infraclavicular adenopathy.   Axillary adenopathy: none    Assessment:   right breast ductal carcinoma in situ, grade 2, ER % NC % at 3:00, 5cm FN measuring 3.9cm     Plan:   Ramya Dale is a 44 year old female has newly diagnosed right breast cancer.  I reviewed the imaging and pathology reports with her and explained the findings.  We talked about the fact that this is noninvasive, stage 0 breast cancer  that is approximately 4-6cm in size and was found on screening mammogram.  We discussed the receptor status. We discussed that breast cancer is treated in a " multidisciplinary fashion and she will also meet with oncology as well as radiation oncology pending surgical decision making and final pathology results.     We next discussed the surgical options for treatment.  I described the procedures for tag localized oncoplastic partial mastectomy (ie. Lumpectomy) with sentinel lymph node biopsy and mastectomy with sentinel lymph node biopsy, possible axillary node dissection including the details of the procedures, the risks, anesthesia and expected recovery.      In review of her mammogram and distribution of calcifications I do not think this is amenable to breast conservation easily. We did discuss option of new stereotactic biopsy of the more medial calcifications and then two tag localized lumpectomy for bracketing but I worry her risk of margin positivity is much higher than typical with the spread of calcifications in the breast on mammogram.     She is most comfortable with mastectomy following review of her imaging.     I advised that lymph node biopsy is recommended whenever we are dealing with invasive breast cancer and described the procedure for sentinel lymph node biopsy.  There is a chance we find an invasive cancer on final pathology and as such with mastectomy for DCIS I would recommend SLNB.  We discussed the use of technetium, blue dye or ICG 9 green dye to identify sentinel nodes. We talked about the risk for lymphedema which is small with removal of only a few nodes, but not zero.        We discussed the various types of mastectomy, including simple with aesthetic flat closure, skin-sparing, and nipple-sparing mastectomy. We also talked about post-mastectomy reconstruction and the stages involved.  We reviewed that the nipple-sparing technique is cosmetic; sensation and contractility will likely be lost.  Ramya  is a candidate for nipple-sparing mastectomy from an oncologic perspective; however, with her ptosis and stretch marks her risk of nipple  necrosis would be increased.  She is comfortable with skin sparing mastectomy. She is leaning toward reconstruction but is not sure. We discussed aesthetic flat closure at length as well.  The option of having immediate versus delayed reconstruction was also discussed.   We reviewed that the advantages of immediate reconstruction includes superior cosmetics, as the skin is preserved.  She would like to meet with plastic surgery to decide on reconstruction or flat closure.     In addition, I have recommended genetic counseling.  She would be a candidate in that situation based on her new diagnosis and young age. The STAT panel was ordered as this could change our surgical management.   The natural history of BRCA mutations and breast cancer were discussed with the patient. Should a deleterious mutation be identified, she would no longer be a good candidate for breast conservation.  We also reviewed the risk reduction benefits of a prophylactic mastectomy in this situation.     We discussed removal of her left breast has no impact on her survival related to her right DCIS. We reviewed pros/cons to keeping versus removing her left breast at length.      Plan:   Plastic surgery referral  Tentative plan for bilateral (pt to decide on this yet) vs right mastectomy with right sentinel node biopsy    60 minutes total time spent on the date of this encounter doing: chart review, review of test results, patient visit, physical exam, education, counseling, developing plan of care, and documenting.    Yennifer Mix MD      Please route or send letter to:  Primary Care Provider (PCP) and Referring Provider

## 2025-01-07 ENCOUNTER — PRE VISIT (OUTPATIENT)
Dept: ONCOLOGY | Facility: CLINIC | Age: 45
End: 2025-01-07
Payer: COMMERCIAL

## 2025-01-07 ENCOUNTER — OFFICE VISIT (OUTPATIENT)
Dept: SURGERY | Facility: CLINIC | Age: 45
End: 2025-01-07
Attending: FAMILY MEDICINE
Payer: COMMERCIAL

## 2025-01-07 ENCOUNTER — LAB (OUTPATIENT)
Dept: LAB | Facility: CLINIC | Age: 45
End: 2025-01-07
Payer: COMMERCIAL

## 2025-01-07 ENCOUNTER — PREP FOR PROCEDURE (OUTPATIENT)
Dept: SURGERY | Facility: CLINIC | Age: 45
End: 2025-01-07

## 2025-01-07 ENCOUNTER — OFFICE VISIT (OUTPATIENT)
Dept: ONCOLOGY | Facility: CLINIC | Age: 45
End: 2025-01-07
Attending: FAMILY MEDICINE
Payer: COMMERCIAL

## 2025-01-07 VITALS
HEIGHT: 61 IN | DIASTOLIC BLOOD PRESSURE: 80 MMHG | TEMPERATURE: 97.9 F | HEART RATE: 90 BPM | SYSTOLIC BLOOD PRESSURE: 136 MMHG | WEIGHT: 240 LBS | RESPIRATION RATE: 14 BRPM | OXYGEN SATURATION: 100 % | BODY MASS INDEX: 45.31 KG/M2

## 2025-01-07 VITALS
WEIGHT: 240 LBS | TEMPERATURE: 97.9 F | RESPIRATION RATE: 14 BRPM | DIASTOLIC BLOOD PRESSURE: 80 MMHG | BODY MASS INDEX: 45.31 KG/M2 | HEIGHT: 61 IN | OXYGEN SATURATION: 100 % | SYSTOLIC BLOOD PRESSURE: 136 MMHG | HEART RATE: 90 BPM

## 2025-01-07 DIAGNOSIS — D05.11 DUCTAL CARCINOMA IN SITU (DCIS) OF RIGHT BREAST: Primary | ICD-10-CM

## 2025-01-07 DIAGNOSIS — D05.11 DUCTAL CARCINOMA IN SITU (DCIS) OF RIGHT BREAST: ICD-10-CM

## 2025-01-07 DIAGNOSIS — I10 BENIGN ESSENTIAL HYPERTENSION: ICD-10-CM

## 2025-01-07 LAB
INTERPRETATION SERPL IEP-IMP: NORMAL
INTERPRETATION SERPL IEP-IMP: NORMAL
LAB PDF RESULT: NORMAL
LAB PDF RESULT: NORMAL
LAB TEST RESULTS REPORTED IN RPTPERIOD: NORMAL
LAB TEST RESULTS REPORTED IN RPTPERIOD: NORMAL
LOCATION OF TASK: NORMAL
LOCATION OF TASK: NORMAL
SEQUENCING METHOD PNL BLD/T: NORMAL
SEQUENCING METHOD PNL BLD/T: NORMAL
SPECIMEN TYPE: NORMAL
SPECIMEN TYPE: NORMAL

## 2025-01-07 PROCEDURE — G0463 HOSPITAL OUTPT CLINIC VISIT: HCPCS

## 2025-01-07 PROCEDURE — 99213 OFFICE O/P EST LOW 20 MIN: CPT | Performed by: SURGERY

## 2025-01-07 PROCEDURE — 99205 OFFICE O/P NEW HI 60 MIN: CPT | Performed by: SURGERY

## 2025-01-07 RX ORDER — CEFAZOLIN SODIUM 2 G/100ML
2 INJECTION, SOLUTION INTRAVENOUS
OUTPATIENT
Start: 2025-01-07

## 2025-01-07 RX ORDER — CEFAZOLIN SODIUM 2 G/100ML
2 INJECTION, SOLUTION INTRAVENOUS SEE ADMIN INSTRUCTIONS
OUTPATIENT
Start: 2025-01-07

## 2025-01-07 ASSESSMENT — PAIN SCALES - GENERAL
PAINLEVEL_OUTOF10: NO PAIN (0)
PAINLEVEL_OUTOF10: NO PAIN (0)

## 2025-01-07 NOTE — PATIENT INSTRUCTIONS
You are scheduled with Dr. Dalila Reed at [facility] on 1/13/25 at 1pm.

## 2025-01-07 NOTE — PROGRESS NOTES
Breast Patients        1-Do you have any of the following symptoms? Other: none  2-In which breast are you having the symptoms? Didn't answer  3-Have you had a Mammogram? Rajat Alvarez - Date:  12/12/24  4-Have you ever had a breast cyst drained? No  5-Have you ever had a breast biopsy? Yes:  Right  -  Date:  12/27/24  6-Have you ever had Breast Cancer? Didn't answer  7-Is there a history of Breast Cancer in your family? No  8-Have you ever had Ovarian Cancer? No  9-Is there a history of Ovarian Cancer in your family? No  10-Is there a history of Colon Cancer in your family?  No  11-Is there a history of Uterine Cancer in your family?  No  12-Any known genetic abnormalities in your family?  No  13-Summarize your caffeine intake (i.e. coffee, tea, chocolate, soda etc.): 2 coffees a day        14-What age did your periods begin?  11        15-Date your last menstrual period began?  1/2/25  16-Number of full-term pregnancies:  3         17-Your age when your first child was born? 34  18-Did you nurse your children? Yes  19-Are you pregnant now? No  20-Have you begun menopause? No  21-Have you had either ovary removed?No  22-Do you have breast implants? No   23-Have you used hormone replacement therapy?  No  24-Have you taken oral contraceptive pills?  Yes, For how many years?  8-9  25-Have you had an intrauterine device (IUD) placed?  No  26-What is your current bra size?  40 C?

## 2025-01-07 NOTE — PROGRESS NOTES
"Oncology Rooming Note    January 7, 2025 8:10 AM   Ramya Dale is a 44 year old female who presents for:    Chief Complaint   Patient presents with    Oncology Clinic Visit     Initial Vitals: /80   Pulse 90   Temp 97.9  F (36.6  C) (Oral)   Resp 14   Ht 1.549 m (5' 1\")   Wt 108.9 kg (240 lb)   LMP 12/09/2024   SpO2 100%   BMI 45.35 kg/m   Estimated body mass index is 45.35 kg/m  as calculated from the following:    Height as of this encounter: 1.549 m (5' 1\").    Weight as of this encounter: 108.9 kg (240 lb). Body surface area is 2.16 meters squared.  No Pain (0) Comment: Data Unavailable   Patient's last menstrual period was 12/09/2024.  Allergies reviewed: Yes  Medications reviewed: Yes    Medications: Medication refills not needed today.  Pharmacy name entered into [facility]: [id number] [place] [address]      [facility], [facility]              "

## 2025-01-07 NOTE — CONFIDENTIAL NOTE
Breast Cancer Nurse Coordination:    Met with Ramya in conjunction with General Surgery today. Patient saw Dr. Yennifer Mix at Multidisciplinary clinic today.     Introduced self and role of breast nurse coordinator.       Upon completion of her consultation, she was provided with notes supporting the discussion, and the plan of care moving forward. Plan is for her to meet with plastic surgery, and plan for surgery scheduling once final surgical decision has been made. Genetic testing - Breast actionable panel recommended. Patient consented for genetic testing, and lab drawn today. Patient aware that she will need a pre-op physical within 30 days of surgery.     Ramya was also provided with a new patient folder which includes informational and educational materials regarding breast cancer: If You Have Breast Cancer, and Mastectomy Surgery (ACS), and support resources such as [employer], American Cancer Society, [facility] [employer], Fighting Cancer through Diet and Lifestyle, [employer], [employer], Pathways, [employer] [place], Breast Cancer Rehabilitation Program, and the [employer] Breast Cancer Support Group. A copy of her pathology report and recent imaging also provided.       Informed patient for mastectomy surgery. Discussed ALEXANDRA drain care. She will want to have 2 post-surgical camisole(s) following her surgery as well as a lanyard for ease of drain management for showering. Provided with [facility] contact, and Rx for post surgical camisoles sent via fax at 4pm.    Will continue to follow and support.      Patient verbalized understanding and all questions were addressed at this time. Encouraged her to call with any questions or needs moving forward.    Kim Davis, RN, BSN, CBCN  Breast Care Nurse Coordinator  [facility]  [phone]

## 2025-01-07 NOTE — LETTER
2025      Ramya Dale  [address] [facility] [address]      Dear Colleague,    Thank you for referring your patient, Ramya Dale, to the [facility]. Please see a copy of my visit note below.    [facility]    Hematology/Oncology New Patient Note      Today's Date: 25    Reason for visit: Right breast DCIS.    HISTORY OF PRESENT ILLNESS: Ramya Dale is a 44 year old female with history of hypertension who presents with the following oncologic history:  1.  2024: Screening mammogram showed calcifications in right breast.  Left breast negative.  2.  2024: Diagnostic right mammogram showed punctate calcifications in segmental distribution in right lower inner breast, middle depth, measuring 3.9 x 2.9 x 1.8 cm.  3.  2024: Right breast biopsy at 3:00, 5 cm from nipple showed grade 2 DCIS with cancerization of lobules associated with microcalcifications, negative for invasive malignancy, ER strongly positive at %, MD strongly positive at %.    Ramya reports she did not feel any right breast symptoms prior to her diagnosis.  Denies bone pain.      REVIEW OF SYSTEMS:   14 point ROS was reviewed and is negative other than as noted above in HPI.       HOME MEDICATIONS:  Current Outpatient Medications   Medication Sig Dispense Refill     triamterene-HCTZ (DYAZIDE) 37.5-25 MG capsule Take 1 capsule by mouth every morning. 90 capsule 3         ALLERGIES:  Allergies   Allergen Reactions     Cats      Pollen Extract      Ragweed - Fall  Usually also bothered for a week in the spring.     Codeine Nausea         PAST MEDICAL HISTORY:  Past Medical History:   Diagnosis Date     Abnormal Pap smear          LSIL (low grade squamous intraepithelial lesion) on Pap smear 2011    colp - WNl     Gynecologic history:  , age of 1st pregnancy at 34,  her children; no HRT use; used OCPs for 8-9 years.    PAST SURGICAL  HISTORY:  Past Surgical History:   Procedure Laterality Date      SECTION N/A 2014    Procedure:  SECTION;  Surgeon: Maribel Nixon MD;  Location: [facility] L+[facility]      SECTION N/A 2017    Procedure:  SECTION;  Surgeon: Diane Anderson MD;  Location: [facility] L+[facility]      SECTION N/A 2020    Procedure: REPEAT  SECTION;  Surgeon: Yadi Ricci MD;  [facility]     [facility]      benign         SOCIAL HISTORY:  Social History     Socioeconomic History     Marital status:      Spouse name: Not on file     Number of children: 0     Years of education: Not on file     Highest education level: Not on file   Occupational History     Occupation: retail     Employer: [employer]     Employer: [email or web address]   Tobacco Use     Smoking status: Never     Smokeless tobacco: Never   Vaping Use     Vaping status: Never Used   Substance and Sexual Activity     Alcohol use: Yes     Comment: 2 times or less per week     Drug use: Never     Sexual activity: Yes     Partners: Male     Birth control/protection: Male Surgical   Other Topics Concern     Parent/sibling w/ CABG, MI or angioplasty before 65F 55M? No   Social History Narrative    Social Documentation:5/14/10        Balanced Diet: YES, could be better    Calcium intake: 1-2 per day    Caffeine: 0-1 per day    Exercise:  type of activity skate, aerobics;  5 times per week    Sunscreen: sometimes    Seatbelts:  Yes    Self Breast Exam:  Yes sometime    Self Testicular Exam: No    Physical/Emotional/Sexual Abuse: No    Do you feel safe in your environment? Yes        Cholesterol screen up to date: No     Eye Exam up to date: Yes    Dental Exam up to date: No    Pap smear up to date: Yes, PAP      NIL   2009    Mammogram up to date: Does Not Apply    Dexa Scan up to date: Does Not Apply    Colonoscopy up to date: Does Not Apply    Immunizations up to date: last td  3/17/2008    Glucose screen if over 40:  N/a        Ly Rubio [facility]                      Social Drivers of Health     Financial Resource Strain: Low Risk  (11/20/2024)    Financial Resource Strain      Within the past 12 months, have you or your family members you live with been unable to get utilities (heat, electricity) when it was really needed?: No   Food Insecurity: Low Risk  (11/20/2024)    Food Insecurity      Within the past 12 months, did you worry that your food would run out before you got money to buy more?: No      Within the past 12 months, did the food you bought just not last and you didn t have money to get more?: No   Transportation Needs: Low Risk  (11/20/2024)    Transportation Needs      Within the past 12 months, has lack of transportation kept you from medical appointments, getting your medicines, non-medical meetings or appointments, work, or from getting things that you need?: No   Physical Activity: Insufficiently Active (11/20/2024)    Exercise Vital Sign      Days of Exercise per Week: 1 day      Minutes of Exercise per Session: 20 min   Stress: Stress Concern Present (11/20/2024)    [employer] [place] [employer] - Occupational Stress Questionnaire      Feeling of Stress : To some extent   Social Connections: Unknown (11/20/2024)    Social Connection and Isolation Panel [NHANES]      Frequency of Communication with Friends and Family: Not on file      Frequency of Social Gatherings with Friends and Family: Once a week      Attends Confucianism Services: Not on file      Active Member of Clubs or Organizations: Not on file      Attends Club or Organization Meetings: Not on file      Marital Status: Not on file   Interpersonal Safety: Low Risk  (11/7/2023)    Interpersonal Safety      Do you feel physically and emotionally safe where you currently live?: Yes      Within the past 12 months, have you been hit, slapped, kicked or otherwise physically hurt by someone?: No       "Within the past 12 months, have you been humiliated or emotionally abused in other ways by your partner or ex-partner?: No   Housing Stability: Low Risk  (2024)    Housing Stability      Do you have housing? : Yes      Are you worried about losing your housing?: No         FAMILY HISTORY:  Family History   Problem Relation Age of Onset     Heart Disease Paternal Grandfather      Heart Disease Maternal Grandfather      Allergies Mother      Eye Disorder Mother      Allergies Maternal Grandmother      Cerebrovascular Disease Maternal Grandmother 92     Depression Maternal Uncle          PHYSICAL EXAM:  Vital signs:  /80   Pulse 90   Temp 97.9  F (36.6  C) (Oral)   Resp 14   Ht 1.549 m (5' 1\")   Wt 108.9 kg (240 lb)   LMP 2024   SpO2 100%   BMI 45.35 kg/m     ECO  GENERAL/CONSTITUTIONAL: No acute distress.  EYES:  No scleral icterus.  ENT/MOUTH: Neck supple. Oropharynx grossly clear.  LYMPH: No cervical, supraclavicular, axillary adenopathy.   BREAST: Small 2-cm hematoma at site of right breast biopsy with ecchymosis. No palpable masses in either remaining breasts. Nipples are everted bilaterally with no discharge. No erythema, ulceration, or dimpling of the skin.   RESPIRATORY: No audible cough or wheezing.   GASTROINTESTINAL: No hepatosplenomegaly, masses, or tenderness.  No guarding.  No distention.  MUSCULOSKELETAL: Warm and well-perfused, no cyanosis, clubbing, or edema.  NEUROLOGIC: No focal motor deficits. Alert, oriented, answers questions appropriately.  INTEGUMENTARY: No rashes or jaundice.  GAIT: Steady, does not use assistive device      LABS:  CBC RESULTS:   Recent Labs   Lab Test 24  1127   WBC 9.3   RBC 4.76   HGB 12.3   HCT 39.0   MCV 82   MCH 25.8*   MCHC 31.5   RDW 13.7          Recent Labs   Lab Test 24  1148 24  1127    141   POTASSIUM 3.9 4.4   CHLORIDE 101 108*   CO2 24 23   ANIONGAP 14 10   GLC 96 94   BUN 12.9 11.1   CR 0.79 0.76   MARRY " 9.8 9.3         PATHOLOGY:  Reviewed as per HPI.    IMAGING:  Reviewed as per HPI.    ASSESSMENT/PLAN:  Ramya Dale is a 44 year old female with the following issues:  1.  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  The area of abnormality measures approximately 3.8 cm.  - She has met with Dr. Yennifer Mix and plans to proceed with bilateral mastectomies.  -Discussed that if she ultimately proceeds with bilateral mastectomies and has negative surgical margins with no findings of invasive cancer, that no chemotherapy or hormone blockade therapy would be indicated.  -Given her young age, advised genetics consult and genetic testing.  She has consented to proceed with genetic testing with the Hereditary Breast Actionable Panel.  --She already had unremarkable CBC and CMP drawn in 11/2024 and will not need these repeated at this time.  --I answered questions she had regarding potential causes of breast cancer.    2. Hypertension  --Blood pressure stable.  Continue Dyazide.    Return 2-3 weeks after surgery.    Sydney Wray MD  [facility] Hematology/Oncology    Total time spent today: 60 minutes in chart review, patient evaluation, counseling, documentation, test and/or medication/prescription orders, and coordination of care.      The longitudinal plan of care for the diagnosis(es)/condition(s) as documented were addressed during this visit. Due to the added complexity in care, I will continue to support Ramya in the subsequent management and with ongoing continuity of care.    Oncology Rooming Note    January 7, 2025 8:10 AM   Ramya Dale is a 44 year old female who presents for:    Chief Complaint   Patient presents with     Oncology Clinic Visit  "    Initial Vitals: /80   Pulse 90   Temp 97.9  F (36.6  C) (Oral)   Resp 14   Ht 1.549 m (5' 1\")   Wt 108.9 kg (240 lb)   LMP 12/09/2024   SpO2 100%   BMI 45.35 kg/m   Estimated body mass index is 45.35 kg/m  as calculated from the following:    Height as of this encounter: 1.549 m (5' 1\").    Weight as of this encounter: 108.9 kg (240 lb). Body surface area is 2.16 meters squared.  No Pain (0) Comment: Data Unavailable   Patient's last menstrual period was 12/09/2024.  Allergies reviewed: Yes  Medications reviewed: Yes    Medications: Medication refills not needed today.  Pharmacy name entered into [facility]: [id number] [place] [address]      [facility], [facility]                  Breast Patients        1-Do you have any of the following symptoms? Other: none  2-In which breast are you having the symptoms? Didn't answer  3-Have you had a Mammogram? [facility] - Date:  12/12/24  4-Have you ever had a breast cyst drained? No  5-Have you ever had a breast biopsy? Yes:  Right  -  Date:  12/27/24  6-Have you ever had Breast Cancer? Didn't answer  7-Is there a history of Breast Cancer in your family? No  8-Have you ever had Ovarian Cancer? No  9-Is there a history of Ovarian Cancer in your family? No  10-Is there a history of Colon Cancer in your family?  No  11-Is there a history of Uterine Cancer in your family?  No  12-Any known genetic abnormalities in your family?  No  13-Summarize your caffeine intake (i.e. coffee, tea, chocolate, soda etc.): 2 coffees a day        14-What age did your periods begin?  11        15-Date your last menstrual period began?  1/2/25  16-Number of full-term pregnancies:  3         17-Your age when your first child was born? 34  18-Did you nurse your children? Yes  19-Are you pregnant now? No  20-Have you begun menopause? No  21-Have you had either ovary removed?No  22-Do you have breast implants? No   23-Have you used hormone replacement " therapy?  No  24-Have you taken oral contraceptive pills?  Yes, For how many years?  8-9  25-Have you had an intrauterine device (IUD) placed?  No  26-What is your current bra size?  40 C?      Again, thank you for allowing me to participate in the care of your patient.        Sincerely,        Sydney Wray MD    Electronically signed

## 2025-01-16 LAB — INTERPRETATION SERPL IEP-IMP: NORMAL

## 2025-01-21 ENCOUNTER — LAB (OUTPATIENT)
Dept: LAB | Facility: CLINIC | Age: 45
End: 2025-01-21
Payer: COMMERCIAL

## 2025-01-21 DIAGNOSIS — D05.11 DUCTAL CARCINOMA IN SITU (DCIS) OF RIGHT BREAST: Primary | ICD-10-CM

## 2025-01-21 PROCEDURE — 81162 BRCA1&2 GEN FULL SEQ DUP/DEL: CPT | Performed by: SURGERY

## 2025-01-23 ENCOUNTER — TELEPHONE (OUTPATIENT)
Dept: SURGERY | Facility: CLINIC | Age: 45
End: 2025-01-23

## 2025-01-23 ENCOUNTER — OFFICE VISIT (OUTPATIENT)
Dept: FAMILY MEDICINE | Facility: CLINIC | Age: 45
End: 2025-01-23
Payer: COMMERCIAL

## 2025-01-23 VITALS
BODY MASS INDEX: 45.14 KG/M2 | WEIGHT: 245.3 LBS | RESPIRATION RATE: 16 BRPM | SYSTOLIC BLOOD PRESSURE: 136 MMHG | OXYGEN SATURATION: 99 % | HEIGHT: 62 IN | HEART RATE: 79 BPM | TEMPERATURE: 97.3 F | DIASTOLIC BLOOD PRESSURE: 81 MMHG

## 2025-01-23 DIAGNOSIS — D05.11 DUCTAL CARCINOMA IN SITU (DCIS) OF RIGHT BREAST: Primary | ICD-10-CM

## 2025-01-23 DIAGNOSIS — Z01.818 PREOP GENERAL PHYSICAL EXAM: Primary | ICD-10-CM

## 2025-01-23 DIAGNOSIS — D05.11 BREAST NEOPLASM, TIS (DCIS), RIGHT: ICD-10-CM

## 2025-01-23 ASSESSMENT — PAIN SCALES - GENERAL: PAINLEVEL_OUTOF10: NO PAIN (0)

## 2025-01-23 NOTE — TELEPHONE ENCOUNTER
Type of surgery: Bilateral mastectomy with right sentinel lymph node biopsy, excision left abdominal wall lipoma and exicsion left breast lipoma  Location of surgery: [facility]  Date and time of surgery: 2/12/25 at 1:30pm  Surgeon: Dr. Yennifer Mix  Pre-Op Appt Date: patient to schedule  Post-Op Appt Date: patient to schedule   Packet sent out: Yes  Pre-cert/Authorization completed:  Not Applicable  Date: 1/23/25

## 2025-01-23 NOTE — PROGRESS NOTES
Preoperative Evaluation  [facility]  [address], [address]  [facility] [address]  Phone: [phone]  Primary Provider: Naida Elliott,   Pre-op Performing Provider: Yadi Mcgrath MD  Jan 23, 2025 1/23/2025   Surgical Information   What procedure is being done? mastectomy   Facility or Hospital where procedure/surgery will be performed: [facility]   Who is doing the procedure / surgery? dr Mix   Date of surgery / procedure: feb 12   Time of surgery / procedure: 12   Where do you plan to recover after surgery? at home with family     Fax number for surgical facility: Note does not need to be faxed, will be available electronically in Epic.    Assessment & Plan     The proposed surgical procedure is considered INTERMEDIATE risk.    (Z01.818) Preop general physical exam  (primary encounter diagnosis)  Comment:   Plan:     (D05.11) Breast neoplasm, Tis (DCIS), right  Comment:   Plan:             - No identified additional risk factors other than previously addressed    Antiplatelet or Anticoagulation Medication Instructions   - Patient is on no antiplatelet or anticoagulation medications.    Additional Medication Instructions  Take all scheduled medications on the day of surgery EXCEPT for modifications listed below:  Hold diuretic the day of the procedure  Recent labs showed normal electrolytes.    Recommendation  Approval given to proceed with proposed procedure, without further diagnostic evaluation.    Zay Bui is a 44 year old, presenting for the following:  Pre-Op Exam        HPI related to upcoming procedure: bilateral mastectomy        1/23/2025   Pre-Op Questionnaire   Have you ever had a heart attack or stroke? No   Have you ever had surgery on your heart or blood vessels, such as a stent placement, a coronary artery bypass, or surgery on an artery in your head, neck, heart, or legs? No   Do you have chest pain with  activity? No   Do you have a history of heart failure? No   Do you currently have a cold, bronchitis or symptoms of other infection? No   Do you have a cough, shortness of breath, or wheezing? No   Do you or anyone in your family have previous history of blood clots? (!) UNKNOWN    Do you or does anyone in your family have a serious bleeding problem such as prolonged bleeding following surgeries or cuts? (!) UNKNOWN    Have you ever had problems with anemia or been told to take iron pills? (!) YES only with pregnancy   Have you had any abnormal blood loss such as black, tarry or bloody stools, or abnormal vaginal bleeding? No   Have you ever had a blood transfusion? No   Are you willing to have a blood transfusion if it is medically needed before, during, or after your surgery? Yes   Have you or any of your relatives ever had problems with anesthesia? (!) UNKNOWN     Do you have sleep apnea, excessive snoring or daytime drowsiness? No   Do you have any artifical heart valves or other implanted medical devices like a pacemaker, defibrillator, or continuous glucose monitor? No   Do you have artificial joints? No   Are you allergic to latex? No     Health Care Directive  Patient does not have a Health Care Directive:     Preoperative Review of    reviewed - no record of controlled substances prescribed.          Patient Active Problem List    Diagnosis Date Noted    Breast neoplasm, Tis (DCIS), right 2024     Priority: Medium    Morbid obesity (H) 10/08/2021     Priority: Medium      Past Medical History:   Diagnosis Date    Abnormal Pap smear         LSIL (low grade squamous intraepithelial lesion) on Pap smear 2011    colp - WNl     Past Surgical History:   Procedure Laterality Date     SECTION N/A 2014    Procedure:  SECTION;  Surgeon: Maribel Nixon MD;  Location:  L+D     SECTION N/A 2017    Procedure:  SECTION;  Surgeon: Diane Anderson MD;   "Location: [facility]     SECTION N/A 2020    Procedure: REPEAT  SECTION;  Surgeon: Yadi Ricci MD;  [facility]    [facility]      benign     Current Outpatient Medications   Medication Sig Dispense Refill    triamterene-HCTZ (DYAZIDE) 37.5-25 MG capsule Take 1 capsule by mouth every morning. 90 capsule 3       Allergies   Allergen Reactions    Cats     Pollen Extract      Ragweed - Fall  Usually also bothered for a week in the spring.    Codeine Nausea        Social History     Tobacco Use    Smoking status: Never    Smokeless tobacco: Never   Substance Use Topics    Alcohol use: Yes     Comment: 2 times or less per week     Family History   Problem Relation Age of Onset    Heart Disease Paternal Grandfather     Heart Disease Maternal Grandfather     Allergies Mother     Eye Disorder Mother     Allergies Maternal Grandmother     Cerebrovascular Disease Maternal Grandmother 92    Depression Maternal Uncle      History   Drug Use Unknown             Review of Systems  Constitutional, HEENT, cardiovascular, pulmonary, gi and gu systems are negative, except as otherwise noted.    Objective    LMP 2024    Estimated body mass index is 45.35 kg/m  as calculated from the following:    Height as of 25: 1.549 m (5' 1\").    Weight as of 25: 108.9 kg (240 lb).  Physical Exam  GENERAL: alert and no distress  EYES: Eyes grossly normal to inspection, PERRL and conjunctivae and sclerae normal  HENT: ear canals and TM's normal, nose and mouth without ulcers or lesions  NECK: no adenopathy, no asymmetry, masses, or scars  RESP: lungs clear to auscultation - no rales, rhonchi or wheezes  CV: regular rate and rhythm, normal S1 S2, no S3 or S4, no murmur, click or rub, no peripheral edema  ABDOMEN: soft, nontender, no hepatosplenomegaly, no masses and bowel sounds normal  MS: no gross musculoskeletal defects noted, no edema  SKIN: no suspicious lesions or " rashes  NEURO: Normal strength and tone, mentation intact and speech normal  PSYCH: mentation appears normal, affect normal/bright    Recent Labs   Lab Test 12/20/24  1148 11/20/24  1127   HGB  --  12.3   PLT  --  312    141   POTASSIUM 3.9 4.4   CR 0.79 0.76        Diagnostics  No labs were ordered during this visit.   No EKG required, no history of coronary heart disease, significant arrhythmia, peripheral arterial disease or other structural heart disease.    Revised Cardiac Risk Index (RCRI)  The patient has the following serious cardiovascular risks for perioperative complications:   - No serious cardiac risks = 0 points     RCRI Interpretation: 0 points: Class I (very low risk - 0.4% complication rate)         Signed Electronically by: Yadi Mcgrath MD  A copy of this evaluation report is provided to the requesting physician.

## 2025-01-23 NOTE — PATIENT INSTRUCTIONS
How to Take Your Medication Before Surgery  Preoperative Medication Instructions   Antiplatelet or Anticoagulation Medication Instructions   - Patient is on no antiplatelet or anticoagulation medications.    Additional Medication Instructions  Take all scheduled medications on the day of surgery EXCEPT for modifications listed below:  HOLD the triamterene - hydrochlorothiazide the night before the procedure    Avoid NSAIDS 1 week prior to procedure       Patient Education   Preparing for Your Surgery  For Adults  Getting started  In most cases, a nurse will call to review your health history and instructions. They will give you an arrival time based on your scheduled surgery time. Please be ready to share:  Your doctor's clinic name and phone number  Your medical, surgical, and anesthesia history  A list of allergies and sensitivities  A list of medicines, including herbal treatments and over-the-counter drugs  Whether the patient has a legal guardian (ask how to send us the papers in advance)  Note: You may not receive a call if you were seen at our PAC (Preoperative Assessment Center).  Please tell us if you're pregnant--or if there's any chance you might be pregnant. Some surgeries may injure a fetus (unborn baby), so they require a pregnancy test. Surgeries that are safe for a fetus don't always need a test, and you can choose whether to have one.   Preparing for surgery  Within 10 to 30 days of surgery: Have a pre-op exam (sometimes called an H&P, or History and Physical). This can be done at a clinic or pre-operative center.  If you're having a , you may not need this exam. Talk to your care team.  At your pre-op exam, talk to your care team about all medicines you take. (This includes CBD oil and any drugs, such as THC, marijuana, and other forms of cannabis.) If you need to stop any medicine before surgery, ask when to start taking it again.  This is for your safety. Many medicines and drugs can  make you bleed too much during surgery. Some change how well surgery (anesthesia) drugs work.  Call your insurance company to let them know you're having surgery. (If you don't have insurance, call [phone].)  Call your clinic if there's any change in your health. This includes a scrape or scratch near the surgery site, or any signs of a cold (sore throat, runny nose, cough, rash, fever).  Eating and drinking guidelines  For your safety: Unless your surgeon tells you otherwise, follow the guidelines below.  Eat and drink as normal until 8 hours before you arrive for surgery. After that, no food or milk. You can spit out gum when you arrive.  Drink clear liquids until 2 hours before you arrive. These are liquids you can see through, like water, Gatorade, and Propel Water. They also include plain black coffee and tea (no cream or milk).  No alcohol for 24 hours before you arrive. The night before surgery, stop any drinks that contain THC.  If your care team tells you to take medicine on the morning of surgery, it's okay to take it with a sip of water. No other medicines or drugs are allowed (including CBD oil)--follow your care team's instructions.  If you have questions the day of surgery, call your hospital or surgery center.   Preventing infection  Shower or bathe the night before and the morning of surgery. Follow the instructions your clinic gave you. (If no instructions, use regular soap.)  Don't shave or clip hair near your surgery site. We'll remove the hair if needed.  Don't smoke or vape the morning of surgery. No chewing tobacco for 6 hours before you arrive. A nicotine patch is okay. You may spit out nicotine gum when you arrive.  For some surgeries, the surgeon will tell you to fully quit smoking and nicotine.  We will make every effort to keep you safe from infection. We will:  Clean our hands often with soap and water (or an alcohol-based hand rub).  Clean the skin at your surgery site with a  special soap that kills germs.  Give you a special gown to keep you warm. (Cold raises the risk of infection.)  Wear hair covers, masks, gowns, and gloves during surgery.  Give antibiotic medicine, if prescribed. Not all surgeries need this medicine.  What to bring on the day of surgery  Photo ID and insurance card  Copy of your health care directive, if you have one  Glasses and hearing aids (bring cases)  You can't wear contacts during surgery  Inhaler and eye drops, if you use them (tell us about these when you arrive)  CPAP machine or breathing device, if you use them  A few personal items, if spending the night  If you have . . .  A pacemaker, ICD (cardiac defibrillator), or other implant: Bring the ID card.  An implanted stimulator: Bring the remote control.  A legal guardian: Bring a copy of the certified (court-stamped) guardianship papers.  Please remove any jewelry, including body piercings. Leave jewelry and other valuables at home.  If you're going home the day of surgery  You must have a responsible adult drive you home. They should stay with you overnight as well.  If you don't have someone to stay with you, and you aren't safe to go home alone, we may keep you overnight. Insurance often won't pay for this.  After surgery  If it's hard to control your pain or you need more pain medicine, please call your surgeon's office.  Questions?   If you have any questions for your care team, list them here:   ____________________________________________________________________________________________________________________________________________________________________________________________________________________________________________________________  For informational purposes only. Not to replace the advice of your health care provider. Copyright   2003, 2019 [facility]. All rights reserved. Clinically reviewed by Alvino Campos MD. [employer]ART[employer] [id number] - REV 08/24.

## 2025-02-06 NOTE — PROGRESS NOTES
PTA medications updated by Medication Scribe prior to surgery via phone call with patient (last doses completed by Nurse)     Medication history sources: Patient, Surescripts, and H&P  In the past week, patient estimated taking medication this percent of the time: Greater than 90%      Significant changes made to the medication list:  None      Additional medication history information:   None    Medication reconciliation completed by provider prior to medication history? No    Time spent in this activity: 20 minutes    The information provided in this note is only as accurate as the sources available at the time of update(s)      Prior to Admission medications    Medication Sig Last Dose Taking? Auth Provider Long Term End Date   triamterene-HCTZ (DYAZIDE) 37.5-25 MG capsule Take 1 capsule by mouth every morning. Morning Yes Naida Elliott J, DO Yes        Medication history completed by: Pallavi Márquez LPN

## 2025-02-12 ENCOUNTER — HOSPITAL ENCOUNTER (OUTPATIENT)
Facility: CLINIC | Age: 45
Discharge: HOME | End: 2025-02-13
Attending: SURGERY | Admitting: PLASTIC SURGERY
Payer: COMMERCIAL

## 2025-02-12 ENCOUNTER — APPOINTMENT (OUTPATIENT)
Dept: GENERAL RADIOLOGY | Facility: CLINIC | Age: 45
End: 2025-02-12
Attending: PATHOLOGY
Payer: COMMERCIAL

## 2025-02-12 ENCOUNTER — ANESTHESIA (OUTPATIENT)
Dept: SURGERY | Facility: CLINIC | Age: 45
End: 2025-02-12
Payer: COMMERCIAL

## 2025-02-12 ENCOUNTER — ANESTHESIA EVENT (OUTPATIENT)
Dept: SURGERY | Facility: CLINIC | Age: 45
End: 2025-02-12
Payer: COMMERCIAL

## 2025-02-12 ENCOUNTER — HOSPITAL ENCOUNTER (OUTPATIENT)
Dept: NUCLEAR MEDICINE | Facility: CLINIC | Age: 45
Setting detail: OBSERVATION
Discharge: HOME | End: 2025-02-12
Attending: SURGERY
Payer: COMMERCIAL

## 2025-02-12 DIAGNOSIS — D05.11 DUCTAL CARCINOMA IN SITU (DCIS) OF RIGHT BREAST: ICD-10-CM

## 2025-02-12 DIAGNOSIS — D05.11 BREAST NEOPLASM, TIS (DCIS), RIGHT: Primary | ICD-10-CM

## 2025-02-12 PROBLEM — Z98.890 POST-OPERATIVE STATE: Status: ACTIVE | Noted: 2025-02-12

## 2025-02-12 LAB — HCG UR QL: NEGATIVE

## 2025-02-12 PROCEDURE — 370N000017 HC ANESTHESIA TECHNICAL FEE, PER MIN: Performed by: SURGERY

## 2025-02-12 PROCEDURE — 21552 EXC NECK LES SC 3 CM/>: CPT | Mod: 59 | Performed by: SURGERY

## 2025-02-12 PROCEDURE — 250N000013 HC RX MED GY IP 250 OP 250 PS 637: Performed by: PLASTIC SURGERY

## 2025-02-12 PROCEDURE — 21552 EXC NECK LES SC 3 CM/>: CPT | Mod: AS | Performed by: PHYSICIAN ASSISTANT

## 2025-02-12 PROCEDURE — 250N000013 HC RX MED GY IP 250 OP 250 PS 637: Performed by: PHYSICIAN ASSISTANT

## 2025-02-12 PROCEDURE — 88304 TISSUE EXAM BY PATHOLOGIST: CPT | Mod: 26 | Performed by: PATHOLOGY

## 2025-02-12 PROCEDURE — 272N000001 HC OR GENERAL SUPPLY STERILE: Performed by: SURGERY

## 2025-02-12 PROCEDURE — 999N000104 MA BREAST SPECIMEN RIGHT OR

## 2025-02-12 PROCEDURE — 81025 URINE PREGNANCY TEST: CPT | Performed by: STUDENT IN AN ORGANIZED HEALTH CARE EDUCATION/TRAINING PROGRAM

## 2025-02-12 PROCEDURE — 258N000003 HC RX IP 258 OP 636: Performed by: NURSE ANESTHETIST, CERTIFIED REGISTERED

## 2025-02-12 PROCEDURE — 19303 MAST SIMPLE COMPLETE: CPT | Mod: 50 | Performed by: SURGERY

## 2025-02-12 PROCEDURE — 343N000001 HC RX 343 MED OP 636: Performed by: SURGERY

## 2025-02-12 PROCEDURE — 88360 TUMOR IMMUNOHISTOCHEM/MANUAL: CPT | Mod: 26 | Performed by: PATHOLOGY

## 2025-02-12 PROCEDURE — 272N000002 HC OR SUPPLY OTHER OPNP: Performed by: SURGERY

## 2025-02-12 PROCEDURE — 999N000141 HC STATISTIC PRE-PROCEDURE NURSING ASSESSMENT: Performed by: SURGERY

## 2025-02-12 PROCEDURE — 88341 IMHCHEM/IMCYTCHM EA ADD ANTB: CPT | Mod: 26 | Performed by: PATHOLOGY

## 2025-02-12 PROCEDURE — 88307 TISSUE EXAM BY PATHOLOGIST: CPT | Mod: 26 | Performed by: PATHOLOGY

## 2025-02-12 PROCEDURE — C1789 PROSTHESIS, BREAST, IMP: HCPCS | Performed by: SURGERY

## 2025-02-12 PROCEDURE — 88342 IMHCHEM/IMCYTCHM 1ST ANTB: CPT | Mod: TC | Performed by: SURGERY

## 2025-02-12 PROCEDURE — 250N000011 HC RX IP 250 OP 636: Performed by: NURSE ANESTHETIST, CERTIFIED REGISTERED

## 2025-02-12 PROCEDURE — 19303 MAST SIMPLE COMPLETE: CPT | Mod: AS | Performed by: PHYSICIAN ASSISTANT

## 2025-02-12 PROCEDURE — 250N000025 HC SEVOFLURANE, PER MIN: Performed by: SURGERY

## 2025-02-12 PROCEDURE — 250N000009 HC RX 250: Performed by: SURGERY

## 2025-02-12 PROCEDURE — 88305 TISSUE EXAM BY PATHOLOGIST: CPT | Mod: TC | Performed by: SURGERY

## 2025-02-12 PROCEDURE — 250N000009 HC RX 250: Performed by: NURSE ANESTHETIST, CERTIFIED REGISTERED

## 2025-02-12 PROCEDURE — 250N000011 HC RX IP 250 OP 636: Performed by: PLASTIC SURGERY

## 2025-02-12 PROCEDURE — 710N000009 HC RECOVERY PHASE 1, LEVEL 1, PER MIN: Performed by: SURGERY

## 2025-02-12 PROCEDURE — 38525 BIOPSY/REMOVAL LYMPH NODES: CPT | Mod: 51 | Performed by: SURGERY

## 2025-02-12 PROCEDURE — 360N000076 HC SURGERY LEVEL 3, PER MIN: Performed by: SURGERY

## 2025-02-12 PROCEDURE — 258N000001 HC RX 258: Performed by: PLASTIC SURGERY

## 2025-02-12 PROCEDURE — 250N000011 HC RX IP 250 OP 636: Performed by: STUDENT IN AN ORGANIZED HEALTH CARE EDUCATION/TRAINING PROGRAM

## 2025-02-12 PROCEDURE — 88342 IMHCHEM/IMCYTCHM 1ST ANTB: CPT | Mod: 26 | Performed by: PATHOLOGY

## 2025-02-12 PROCEDURE — A9520 TC99 TILMANOCEPT DIAG 0.5MCI: HCPCS | Performed by: SURGERY

## 2025-02-12 PROCEDURE — 38792 RA TRACER ID OF SENTINL NODE: CPT

## 2025-02-12 DEVICE — NATRELLE TE SMOOTH 133S-MV-15-T (US)
Type: IMPLANTABLE DEVICE | Site: BREAST | Status: FUNCTIONAL
Brand: NATRELLE 133S TISSUE EXPANDERS

## 2025-02-12 RX ORDER — NALOXONE HYDROCHLORIDE 0.4 MG/ML
0.4 INJECTION, SOLUTION INTRAMUSCULAR; INTRAVENOUS; SUBCUTANEOUS
Status: DISCONTINUED | OUTPATIENT
Start: 2025-02-12 | End: 2025-02-13 | Stop reason: HOSPADM

## 2025-02-12 RX ORDER — ONDANSETRON 2 MG/ML
4 INJECTION INTRAMUSCULAR; INTRAVENOUS EVERY 6 HOURS PRN
Status: DISCONTINUED | OUTPATIENT
Start: 2025-02-12 | End: 2025-02-13 | Stop reason: HOSPADM

## 2025-02-12 RX ORDER — PROCHLORPERAZINE MALEATE 10 MG
10 TABLET ORAL EVERY 6 HOURS PRN
Status: DISCONTINUED | OUTPATIENT
Start: 2025-02-12 | End: 2025-02-13 | Stop reason: HOSPADM

## 2025-02-12 RX ORDER — OXYCODONE HYDROCHLORIDE 5 MG/1
5 TABLET ORAL EVERY 4 HOURS PRN
Status: DISCONTINUED | OUTPATIENT
Start: 2025-02-12 | End: 2025-02-13 | Stop reason: HOSPADM

## 2025-02-12 RX ORDER — ONDANSETRON 2 MG/ML
4 INJECTION INTRAMUSCULAR; INTRAVENOUS EVERY 30 MIN PRN
Status: DISCONTINUED | OUTPATIENT
Start: 2025-02-12 | End: 2025-02-12 | Stop reason: HOSPADM

## 2025-02-12 RX ORDER — ONDANSETRON 4 MG/1
4 TABLET, ORALLY DISINTEGRATING ORAL EVERY 6 HOURS PRN
Status: DISCONTINUED | OUTPATIENT
Start: 2025-02-12 | End: 2025-02-13 | Stop reason: HOSPADM

## 2025-02-12 RX ORDER — HYDROMORPHONE HCL IN WATER/PF 6 MG/30 ML
0.4 PATIENT CONTROLLED ANALGESIA SYRINGE INTRAVENOUS
Status: DISCONTINUED | OUTPATIENT
Start: 2025-02-12 | End: 2025-02-13 | Stop reason: HOSPADM

## 2025-02-12 RX ORDER — OXYCODONE HYDROCHLORIDE 5 MG/1
10 TABLET ORAL EVERY 4 HOURS PRN
Status: DISCONTINUED | OUTPATIENT
Start: 2025-02-12 | End: 2025-02-13 | Stop reason: HOSPADM

## 2025-02-12 RX ORDER — NALOXONE HYDROCHLORIDE 0.4 MG/ML
0.1 INJECTION, SOLUTION INTRAMUSCULAR; INTRAVENOUS; SUBCUTANEOUS
Status: DISCONTINUED | OUTPATIENT
Start: 2025-02-12 | End: 2025-02-12 | Stop reason: HOSPADM

## 2025-02-12 RX ORDER — PROPOFOL 10 MG/ML
INJECTION, EMULSION INTRAVENOUS CONTINUOUS PRN
Status: DISCONTINUED | OUTPATIENT
Start: 2025-02-12 | End: 2025-02-12

## 2025-02-12 RX ORDER — ONDANSETRON 4 MG/1
4 TABLET, ORALLY DISINTEGRATING ORAL EVERY 30 MIN PRN
Status: DISCONTINUED | OUTPATIENT
Start: 2025-02-12 | End: 2025-02-12 | Stop reason: HOSPADM

## 2025-02-12 RX ORDER — METHOCARBAMOL 500 MG/1
500 TABLET, FILM COATED ORAL 4 TIMES DAILY
Status: DISCONTINUED | OUTPATIENT
Start: 2025-02-12 | End: 2025-02-13 | Stop reason: HOSPADM

## 2025-02-12 RX ORDER — ONDANSETRON 2 MG/ML
INJECTION INTRAMUSCULAR; INTRAVENOUS PRN
Status: DISCONTINUED | OUTPATIENT
Start: 2025-02-12 | End: 2025-02-12

## 2025-02-12 RX ORDER — CEFAZOLIN SODIUM/WATER 2 G/20 ML
2 SYRINGE (ML) INTRAVENOUS SEE ADMIN INSTRUCTIONS
Status: DISCONTINUED | OUTPATIENT
Start: 2025-02-12 | End: 2025-02-12 | Stop reason: HOSPADM

## 2025-02-12 RX ORDER — CEFAZOLIN SODIUM/WATER 2 G/20 ML
2 SYRINGE (ML) INTRAVENOUS
Status: DISCONTINUED | OUTPATIENT
Start: 2025-02-12 | End: 2025-02-12 | Stop reason: HOSPADM

## 2025-02-12 RX ORDER — DEXAMETHASONE SODIUM PHOSPHATE 4 MG/ML
4 INJECTION, SOLUTION INTRA-ARTICULAR; INTRALESIONAL; INTRAMUSCULAR; INTRAVENOUS; SOFT TISSUE
Status: DISCONTINUED | OUTPATIENT
Start: 2025-02-12 | End: 2025-02-12 | Stop reason: HOSPADM

## 2025-02-12 RX ORDER — NALOXONE HYDROCHLORIDE 0.4 MG/ML
0.2 INJECTION, SOLUTION INTRAMUSCULAR; INTRAVENOUS; SUBCUTANEOUS
Status: DISCONTINUED | OUTPATIENT
Start: 2025-02-12 | End: 2025-02-13 | Stop reason: HOSPADM

## 2025-02-12 RX ORDER — ACETAMINOPHEN 500 MG
1000 TABLET ORAL ONCE
Status: COMPLETED | OUTPATIENT
Start: 2025-02-12 | End: 2025-02-12

## 2025-02-12 RX ORDER — LIDOCAINE HYDROCHLORIDE 20 MG/ML
INJECTION, SOLUTION INFILTRATION; PERINEURAL PRN
Status: DISCONTINUED | OUTPATIENT
Start: 2025-02-12 | End: 2025-02-12

## 2025-02-12 RX ORDER — FENTANYL CITRATE 50 UG/ML
INJECTION, SOLUTION INTRAMUSCULAR; INTRAVENOUS PRN
Status: DISCONTINUED | OUTPATIENT
Start: 2025-02-12 | End: 2025-02-12

## 2025-02-12 RX ORDER — ACETAMINOPHEN 325 MG/1
975 TABLET ORAL EVERY 8 HOURS
Status: DISCONTINUED | OUTPATIENT
Start: 2025-02-12 | End: 2025-02-13 | Stop reason: HOSPADM

## 2025-02-12 RX ORDER — PROPOFOL 10 MG/ML
INJECTION, EMULSION INTRAVENOUS PRN
Status: DISCONTINUED | OUTPATIENT
Start: 2025-02-12 | End: 2025-02-12

## 2025-02-12 RX ORDER — FENTANYL CITRATE 50 UG/ML
50 INJECTION, SOLUTION INTRAMUSCULAR; INTRAVENOUS EVERY 5 MIN PRN
Status: DISCONTINUED | OUTPATIENT
Start: 2025-02-12 | End: 2025-02-12 | Stop reason: HOSPADM

## 2025-02-12 RX ORDER — FENTANYL CITRATE 50 UG/ML
25 INJECTION, SOLUTION INTRAMUSCULAR; INTRAVENOUS EVERY 5 MIN PRN
Status: DISCONTINUED | OUTPATIENT
Start: 2025-02-12 | End: 2025-02-12 | Stop reason: HOSPADM

## 2025-02-12 RX ORDER — HYDROMORPHONE HCL IN WATER/PF 6 MG/30 ML
0.2 PATIENT CONTROLLED ANALGESIA SYRINGE INTRAVENOUS EVERY 5 MIN PRN
Status: DISCONTINUED | OUTPATIENT
Start: 2025-02-12 | End: 2025-02-12 | Stop reason: HOSPADM

## 2025-02-12 RX ORDER — IBUPROFEN 600 MG/1
600 TABLET, FILM COATED ORAL EVERY 6 HOURS PRN
Status: DISCONTINUED | OUTPATIENT
Start: 2025-02-12 | End: 2025-02-13 | Stop reason: HOSPADM

## 2025-02-12 RX ORDER — MAGNESIUM HYDROXIDE 1200 MG/15ML
LIQUID ORAL PRN
Status: DISCONTINUED | OUTPATIENT
Start: 2025-02-12 | End: 2025-02-12 | Stop reason: HOSPADM

## 2025-02-12 RX ORDER — ACETAMINOPHEN 325 MG/1
650 TABLET ORAL EVERY 4 HOURS PRN
Qty: 100 TABLET | Refills: 0 | Status: SHIPPED | OUTPATIENT
Start: 2025-02-12 | End: 2025-02-25

## 2025-02-12 RX ORDER — SODIUM CHLORIDE, SODIUM LACTATE, POTASSIUM CHLORIDE, CALCIUM CHLORIDE 600; 310; 30; 20 MG/100ML; MG/100ML; MG/100ML; MG/100ML
INJECTION, SOLUTION INTRAVENOUS CONTINUOUS PRN
Status: DISCONTINUED | OUTPATIENT
Start: 2025-02-12 | End: 2025-02-12

## 2025-02-12 RX ORDER — SODIUM CHLORIDE, SODIUM LACTATE, POTASSIUM CHLORIDE, CALCIUM CHLORIDE 600; 310; 30; 20 MG/100ML; MG/100ML; MG/100ML; MG/100ML
INJECTION, SOLUTION INTRAVENOUS CONTINUOUS
Status: DISCONTINUED | OUTPATIENT
Start: 2025-02-12 | End: 2025-02-12 | Stop reason: HOSPADM

## 2025-02-12 RX ORDER — HYDROMORPHONE HCL IN WATER/PF 6 MG/30 ML
0.2 PATIENT CONTROLLED ANALGESIA SYRINGE INTRAVENOUS
Status: DISCONTINUED | OUTPATIENT
Start: 2025-02-12 | End: 2025-02-13 | Stop reason: HOSPADM

## 2025-02-12 RX ORDER — CEFAZOLIN SODIUM/WATER 2 G/20 ML
2 SYRINGE (ML) INTRAVENOUS
Status: COMPLETED | OUTPATIENT
Start: 2025-02-12 | End: 2025-02-12

## 2025-02-12 RX ORDER — DEXAMETHASONE SODIUM PHOSPHATE 4 MG/ML
INJECTION, SOLUTION INTRA-ARTICULAR; INTRALESIONAL; INTRAMUSCULAR; INTRAVENOUS; SOFT TISSUE PRN
Status: DISCONTINUED | OUTPATIENT
Start: 2025-02-12 | End: 2025-02-12

## 2025-02-12 RX ORDER — LIDOCAINE 40 MG/G
CREAM TOPICAL
Status: DISCONTINUED | OUTPATIENT
Start: 2025-02-12 | End: 2025-02-13 | Stop reason: HOSPADM

## 2025-02-12 RX ORDER — AMOXICILLIN 250 MG
1 CAPSULE ORAL 2 TIMES DAILY
Status: DISCONTINUED | OUTPATIENT
Start: 2025-02-12 | End: 2025-02-13 | Stop reason: HOSPADM

## 2025-02-12 RX ORDER — HYDROMORPHONE HCL IN WATER/PF 6 MG/30 ML
0.4 PATIENT CONTROLLED ANALGESIA SYRINGE INTRAVENOUS EVERY 5 MIN PRN
Status: DISCONTINUED | OUTPATIENT
Start: 2025-02-12 | End: 2025-02-12 | Stop reason: HOSPADM

## 2025-02-12 RX ADMIN — SODIUM CHLORIDE, POTASSIUM CHLORIDE, SODIUM LACTATE AND CALCIUM CHLORIDE: 600; 310; 30; 20 INJECTION, SOLUTION INTRAVENOUS at 14:39

## 2025-02-12 RX ADMIN — FENTANYL CITRATE 50 MCG: 50 INJECTION INTRAMUSCULAR; INTRAVENOUS at 13:14

## 2025-02-12 RX ADMIN — DEXAMETHASONE SODIUM PHOSPHATE 4 MG: 4 INJECTION, SOLUTION INTRA-ARTICULAR; INTRALESIONAL; INTRAMUSCULAR; INTRAVENOUS; SOFT TISSUE at 13:15

## 2025-02-12 RX ADMIN — ONDANSETRON 4 MG: 2 INJECTION INTRAMUSCULAR; INTRAVENOUS at 13:15

## 2025-02-12 RX ADMIN — PROPOFOL 20 MCG/KG/MIN: 10 INJECTION, EMULSION INTRAVENOUS at 13:13

## 2025-02-12 RX ADMIN — METHOCARBAMOL 500 MG: 500 TABLET ORAL at 19:05

## 2025-02-12 RX ADMIN — PHENYLEPHRINE HYDROCHLORIDE 0.5 MCG/KG/MIN: 10 INJECTION INTRAVENOUS at 13:20

## 2025-02-12 RX ADMIN — FENTANYL CITRATE 25 MCG: 50 INJECTION, SOLUTION INTRAMUSCULAR; INTRAVENOUS at 17:36

## 2025-02-12 RX ADMIN — FENTANYL CITRATE 50 MCG: 50 INJECTION, SOLUTION INTRAMUSCULAR; INTRAVENOUS at 17:06

## 2025-02-12 RX ADMIN — HYDROMORPHONE HYDROCHLORIDE 0.5 MG: 1 INJECTION, SOLUTION INTRAMUSCULAR; INTRAVENOUS; SUBCUTANEOUS at 13:33

## 2025-02-12 RX ADMIN — MIDAZOLAM 2 MG: 1 INJECTION INTRAMUSCULAR; INTRAVENOUS at 13:05

## 2025-02-12 RX ADMIN — SENNOSIDES AND DOCUSATE SODIUM 1 TABLET: 50; 8.6 TABLET ORAL at 20:22

## 2025-02-12 RX ADMIN — PROPOFOL 200 MG: 10 INJECTION, EMULSION INTRAVENOUS at 13:09

## 2025-02-12 RX ADMIN — FENTANYL CITRATE 50 MCG: 50 INJECTION INTRAMUSCULAR; INTRAVENOUS at 13:09

## 2025-02-12 RX ADMIN — ACETAMINOPHEN 975 MG: 325 TABLET, FILM COATED ORAL at 20:21

## 2025-02-12 RX ADMIN — OXYCODONE HYDROCHLORIDE 5 MG: 5 TABLET ORAL at 20:22

## 2025-02-12 RX ADMIN — PROPOFOL 50 MG: 10 INJECTION, EMULSION INTRAVENOUS at 13:15

## 2025-02-12 RX ADMIN — PROPOFOL 30 MG: 10 INJECTION, EMULSION INTRAVENOUS at 13:13

## 2025-02-12 RX ADMIN — SODIUM CHLORIDE, POTASSIUM CHLORIDE, SODIUM LACTATE AND CALCIUM CHLORIDE: 600; 310; 30; 20 INJECTION, SOLUTION INTRAVENOUS at 13:00

## 2025-02-12 RX ADMIN — ACETAMINOPHEN 975 MG: 325 TABLET, FILM COATED ORAL at 12:17

## 2025-02-12 RX ADMIN — PROPOFOL 50 MG: 10 INJECTION, EMULSION INTRAVENOUS at 13:17

## 2025-02-12 RX ADMIN — Medication 2 G: at 13:04

## 2025-02-12 RX ADMIN — LIDOCAINE HYDROCHLORIDE 60 MG: 20 INJECTION, SOLUTION INFILTRATION; PERINEURAL at 13:09

## 2025-02-12 RX ADMIN — TILMANOCEPT 0.5 MILLICURIE: KIT at 12:35

## 2025-02-12 ASSESSMENT — ACTIVITIES OF DAILY LIVING (ADL)
ADLS_ACUITY_SCORE: 46
ADLS_ACUITY_SCORE: 49
ADLS_ACUITY_SCORE: 46
ADLS_ACUITY_SCORE: 49
ADLS_ACUITY_SCORE: 46

## 2025-02-12 NOTE — ANESTHESIA PROCEDURE NOTES
Airway       Patient location during procedure: OR  Staff -        CRNA: Leeanna Rizzo APRN CRNA       Performed By: CRNA  Consent for Airway        Urgency: elective  Indications and Patient Condition       Indications for airway management: parul-procedural       Induction type:intravenous       Mask difficulty assessment: 0 - not attempted    Final Airway Details       Final airway type: supraglottic airway    Supraglottic Airway Details        Type: LMA       Brand: I-Gel       LMA size: 4    Post intubation assessment        Placement verified by: capnometry, equal breath sounds and chest rise        Number of attempts at approach: 1       Number of other approaches attempted: 0       Secured with: tape       Ease of procedure: easy       Dentition: Intact and Unchanged

## 2025-02-12 NOTE — OP NOTE
Plastic Surgery Op Note  Patient Name:  Ramya Dale   Date of Service:  2/12/2025     PREOPERATIVE DIAGNOSES:   1. Ductal carcinoma in situ (DCIS) of right breast [D05.11]  Breast cancer (H) [C50.919]   2.  Acquired absence of bilateral breasts.     POSTOPERATIVE DIAGNOSES:   1. Ductal carcinoma in situ (DCIS) of right breast [D05.11]  Breast cancer (H) [C50.919]   2. Acquired absence of bilateral breasts.     PROCEDURES:   1. Placement of bilateral tissue expanders.   2. Local tissue rearrangement 18 x 5 cm and 20 x 6 cm    Surgeon:  Dalila Reed MD   OR Staff:  Circulator: Catie Gomez RN; Rosemarie Perez RN  Relief Circulator: Canelo Mann RN; Mckinley Campos RN  Relief Scrub: Domenica Hallman  Scrub Person: Laurie Youssef RN; Monse Tinoco  First Assistant: Delbert Hester  Anesthesia:  General   Estimated Blood Loss:  * No blood loss amount entered *   Specimen(s):   ID Type Source Tests Collected by Time Destination   1 : right breast mastectomy. short stitch superior, long lateral Mastectomy, Simple Breast, Right SURGICAL PATHOLOGY EXAM Yennifer Mix MD 2/12/2025  2:02 PM    2 : right axillary sentinal lymph node #1 Tissue Lymph Node(s), Axillary, Right SURGICAL PATHOLOGY EXAM Yennifer Mix MD 2/12/2025  2:08 PM    3 : right axillary sentinal lymph node #2 Tissue Lymph Node(s), Axillary, Right SURGICAL PATHOLOGY EXAM Yennifer Mix MD 2/12/2025  2:09 PM    4 : LEFT BREAST MASTECTOMY, SHORT STITCH SUPERIOR, LONG LATERAL Mastectomy, Simple Breast, Left SURGICAL PATHOLOGY EXAM Yennifre Mix MD 2/12/2025  2:55 PM    5 : LEFT CHEST WALL SOFT TISSUE MASS Tissue Chest SURGICAL PATHOLOGY EXAM Yennifer Mix MD 2/12/2025  3:04 PM          DESCRIPTION OF PROCEDURE:  Ramya Dale was marked for incisions and taken to the operating room with Dr. Mix.  She completed his/her portion of the procedure.     On the right side, the mastectomy flaps were inspected  and found to be adequate for a pre-pectoral reconstruction.  The pocket was irrigated with Betadine followed by Vashe irrigation after confirming hemostasis.  A tissue expander was made ready for insertion and placed within the pocket.  Suture tabs were secured using interrupted 2-0 PDS suture.  Additional fluid was placed within the tissue expander.  Two 15 Haitian ALEXANDRA drains were placed and secured with 3-0 nylon suture.  Patient was brought to the upright position and the excess skin was marked along the mastectomy incisions. Patient was returned to the supine position and the excess skin along the inferior mastectomy flap was de-epithelialized. The flap was then secure to the superior mastectomy flap using interrupted 3-0 PDS suture. The skin incision was then closed interrupted 3-0 Monocryl in the subcutaneous tissue followed by running 4-0 subcuticular Monocryl suture. Prior to closure 0.25 % marcaine was placed in the mastectomy defect.     On the left side, the mastectomy flaps were inspected and found to be adequate for a pre-pectoral reconstruction.  The pocket was irrigated with Betadine followed by Vashe irrigation after confirming hemostasis.  A tissue expander was made ready for insertion and placed within the pocket.  Suture tabs were secured using interrupted 2-0 PDS suture.  Additional fluid was placed within the tissue expander.  Two 15 Haitian ALEXANDRA drains were placed and secured with 3-0 nylon suture.  Patient was brought to the upright position and the excess skin was marked along the mastectomy incisions. Patient was returned to the supine position and the excess skin along the inferior mastectomy flap was de-epithelialized. The flap was then secure to the superior mastectomy flap using interrupted 3-0 PDS suture. The skin incision was then closed interrupted 3-0 Monocryl in the subcutaneous tissue followed by running 4-0 subcuticular Monocryl suture. Prior to closure 0.25 % marcaine was placed in  the mastectomy defect. Prior to closure 0.25 % marcaine was placed in the mastectomy defect.   Xeroform followed by light dressing was applied.    Implant Name Type Inv. Item Serial No.  Lot No. LRB No. Used Action   TISSUE EXPANDER NATRELLE MAGNA 600CC [id number] - [id number] Breast Implant/Tissue Expander TISSUE EXPANDER NATRELLE MAGNA 600CC [id number] [employer]  Left 1 Implanted   TISSUE EXPANDER NATRELLE MAGNA 600CC [id number] - [id number] Breast Implant/Tissue Expander TISSUE EXPANDER NATRELLE MAGNA 600CC [id number] [employer]  Right 1 Implanted        FINDINGS:  Both tissue expander(s) is/are  600 cc and has/have been filled with 150 cc of saline.     MD [facility]   [phone]

## 2025-02-12 NOTE — DISCHARGE INSTRUCTIONS
[facility]  Discharge Instructions: Post-Operative Mastectomy    ACTIVITY  Take frequent, short walks and increase your activity gradually.    Avoid strenuous physical activity or heavy lifting greater than 15-20 lbs. for 2 weeks on the side of surgery.  You may climb stairs.   Gentle rotation and stretching of your arms and shoulders will prevent joint stiffness.  You may drive without restrictions when you are not using any prescription pain medication and feel comfortable in a car.  You may return to work/school when you are comfortable without any prescription pain medication.    WOUND/DRAIN CARE  Defer to plastic surgery instructions.    DIET  Start with liquids, then gradually resume your regular diet as tolerated.   Drink plenty of fluids to stay hydrated.    PAIN  Expect some tenderness and discomfort at the incision site(s).  Use the prescribed pain medication at your discretion.  Expect gradual resolution of your pain over several days.  You may take ibuprofen with food (unless you have been told not to) or acetaminophen/Tylenol instead of or in addition to your prescribed pain medication.  However, if you are taking Norco or Percocet, do not take any additional acetaminophen/Tylenol.  Do not drink alcohol or drive while you are taking pain medications or muscle relaxants.    EXPECTATIONS  Pain medications can cause constipation.  Limit use when possible.  Take an over the counter or prescribed stool softener/stimulant, such as Colace or Senna, 1-2 times a day with plenty of water.  You may take a mild over the counter laxative, such as Miralax or a suppository, as needed.    You may discontinue these medications once you are having regular bowel movements and/or are no longer taking your narcotic pain medication.    RETURN APPOINTMENT  Follow up in our office when your drain is ready to be removed, which is usually within a week.  You will also follow up with Dr. Mix  [Nutrition/ Exercise/ Weight Management] : nutrition, exercise, weight management in 2 weeks.  Please call our office at [phone] to schedule your appointment.    CALL OUR OFFICE -[phone] IF YOU HAVE:   Chills or fever above 101 F.  Increased redness, warmth, or drainage at your incisions.  Significant bleeding or swelling.  Pain not relieved by your pain medication or rest.  Increasing pain after the first 48 hours.  Any other concerns or questions.           Revised December 2021

## 2025-02-12 NOTE — ANESTHESIA PREPROCEDURE EVALUATION
Anesthesia Pre-Procedure Evaluation    Patient: Ramya Dale   MRN: [id number] : 1980        Procedure : Procedure(s):  bilateral mastectomies with right sentinel node biopsy and excision left breast lipoma, removal of left chest mass  Insertion tissue expander, breasts, bilateral          Past Medical History:   Diagnosis Date    Abnormal Pap smear         LSIL (low grade squamous intraepithelial lesion) on Pap smear 2011    colp - [facility]      Past Surgical History:   Procedure Laterality Date     SECTION N/A 2014    Procedure:  SECTION;  Surgeon: Maribel Nixon MD;  Location: [facility] L[facility]     SECTION N/A 2017    Procedure:  SECTION;  Surgeon: Diane Anderson MD;  Location: [facility] L[facility]     SECTION N/A 2020    Procedure: REPEAT  SECTION;  Surgeon: Yadi Ricci MD;  Location: [facility] L+[facility]    ZZHC EXC LESION OF TONGUE W/O CLOSURE      benign      Allergies   Allergen Reactions    Cats     Pollen Extract      Ragweed - Fall  Usually also bothered for a week in the spring.      Social History     Tobacco Use    Smoking status: Never     Passive exposure: Never    Smokeless tobacco: Never   Substance Use Topics    Alcohol use: Yes     Comment: 2 times or less per week      Wt Readings from Last 1 Encounters:   25 114.4 kg (252 lb 3.2 oz)        Anesthesia Evaluation            ROS/MED HX  ENT/Pulmonary:    (-) asthma and sleep apnea   Neurologic:    (-) migraines   Cardiovascular:    (-) hypertension and dyslipidemia   METS/Exercise Tolerance: 3 - Able to walk 1-2 blocks without stopping    Hematologic:       Musculoskeletal:       GI/Hepatic:    (-) GERD   Renal/Genitourinary:    (-) renal disease   Endo:     (+)               Obesity,    (-) Type II DM   Psychiatric/Substance Use:       Infectious Disease:       Malignancy:   (+) Malignancy, History of Breast.    Other:            Physical Exam    Airway        Mallampati: III  "  TM distance: > 3 FB   Neck ROM: full   Mouth opening: > 3 cm    Respiratory Devices and Support         Dental       (+) Modest Abnormalities - crowns, retainers, 1 or 2 missing teeth      Cardiovascular   cardiovascular exam normal          Pulmonary   pulmonary exam normal                OUTSIDE LABS:  CBC:   Lab Results   Component Value Date    WBC 9.3 11/20/2024    WBC 10.0 11/07/2023    HGB 12.3 11/20/2024    HGB 12.8 11/07/2023    HCT 39.0 11/20/2024    HCT 40.6 11/07/2023     11/20/2024     11/07/2023     BMP:   Lab Results   Component Value Date     12/20/2024     11/20/2024    POTASSIUM 3.9 12/20/2024    POTASSIUM 4.4 11/20/2024    CHLORIDE 101 12/20/2024    CHLORIDE 108 (H) 11/20/2024    CO2 24 12/20/2024    CO2 23 11/20/2024    BUN 12.9 12/20/2024    BUN 11.1 11/20/2024    CR 0.79 12/20/2024    CR 0.76 11/20/2024    GLC 96 12/20/2024    GLC 94 11/20/2024     COAGS: No results found for: \"PTT\", \"INR\", \"FIBR\"  POC:   Lab Results   Component Value Date    HCG Negative 02/12/2025     HEPATIC:   Lab Results   Component Value Date    ALBUMIN 4.1 11/20/2024    PROTTOTAL 7.1 11/20/2024    ALT 16 11/20/2024    AST 20 11/20/2024    ALKPHOS 65 11/20/2024    BILITOTAL 0.2 11/20/2024     OTHER:   Lab Results   Component Value Date    MARRY 9.8 12/20/2024    TSH 0.71 10/14/2022       Anesthesia Plan    ASA Status:  3    NPO Status:  NPO Appropriate    Anesthesia Type: General.     - Airway: LMA   Induction: Propofol.   Maintenance: Balanced.        Consents    Anesthesia Plan(s) and associated risks, benefits, and realistic alternatives discussed. Questions answered and patient/representative(s) expressed understanding.     - Discussed: Risks, Benefits and Alternatives for BOTH SEDATION and the PROCEDURE were discussed     - Discussed with:  Patient            Postoperative Care    Pain management: IV analgesics, Oral pain medications, Multi-modal analgesia.   PONV prophylaxis: Ondansetron " "(or other 5HT-3), Dexamethasone or Solumedrol, Background Propofol Infusion     Comments:               Jai Beltran MD    I have reviewed the pertinent notes and labs in the chart from the past 30 days and (re)examined the patient.  Any updates or changes from those notes are reflected in this note.    Clinically Significant Risk Factors Present on Admission                             # Severe Obesity: Estimated body mass index is 46.13 kg/m  as calculated from the following:    Height as of this encounter: 1.575 m (5' 2\").    Weight as of this encounter: 114.4 kg (252 lb 3.2 oz).                "

## 2025-02-12 NOTE — OP NOTE
[facility] Breast Surgery Operative Note    PREOPERATIVE DIAGNOSIS:  right breast cancer, left chest wall soft tissue mass    POSTOPERATIVE DIAGNOSIS:  same, pathology pending    PROCEDURE:    1.  Bilateral skin sparing mastectomies   2. Right sentinel lymph node biopsy  3. Excision of left chest wall lipoma measuring 15cm x 10cm  4. Reconstruction per Dr. Reed, please see their operative report for details regarding their portion of the procedure.     SURGEON:  Yennifer Mix MD    ASSISTANT:  Yasmani Linares PA-C  The physician s assistant was medically necessary for their expertise in retraction and suctioning.    ANESTHESIA:  General.    BLOOD LOSS: 40ml    FINDINGS: clinically benign right axillary sentinel nodes    INDICATIONS:   Please see my clinic notes for details.     DETAILS OF PROCEDURE:     The patient was taken to the operating room and placed in supine position and general anesthesia by endotracheal tube.   Perioperative antiobiotics were given. SCDs were placed on the lower extremities. I performed a bilateral PECS block using ultrasound guidance.  Prior to coming to the operating room, patient had the right breast injected with radiolabeled sulfur colloid.   The patient was also marked by Dr. Reed with skin sparing periareolar ellipse  incisions.     The breasts and axilla were prepped with chloraprep and draped in the usual sterile fashion.  The timeout procedure was performed.      Starting on the right side, a periareolar skin sparing incision was made following the pre operative markings with a #10 scalpel blade and deepened with electrocautery.  The superior flap was raised with electrocautery up to the top edge of the breast tissue just under the clavicle.  The inferior flap was similarly raised using the cautery down to the inframammary fold.  Flaps were raised medially to the lateral aspect of the sternum and laterally to the lateral chest wall. The breast tissue was then elevated off of  the pectoralis fascia with cautery. The fascia was excised with the breast tissue.  Once the breast was removed, it was oriented with sutures with a short suture superior and long suture lateral.  The breast was sent to pathology to be placed in formalin and have routine pathology exam.        Attention was then turned to the right axilla.  The fascia was incised along the edge of the pectoralis muscle.  The Neoprobe was used to identify the site of increased radioactivity. A cluster of normal appearing nodes were excised which had counts of >100 and 25.   The nodes were sent to pathology for review.  The nodes were clinically negative.  There was no ongoing uptake of tracer in the axilla.  The right side was packed with a saline-wetted lap pad and covered with a dry towel while attention was turned to the left breast.     A similar  incision was made on the left side with a #10 scalpel blade and deepened with electrocautery.  Again, the flaps were raised with electrocautery and the breast was dissected away from the pectoralis fascia.  The tissue was oriented with a short suture superior and long suture lateral.  The breast was then sent to pathology and placed in formalin for permanent section.      We then directed our attention to the lipoma on her left lateral chest wall.  15 blade was used to make an incision overlying the visible lipoma.  Cautery was used to divide the subcutaneous tissue.  A 15 cm x 10 cm well-circumscribed fatty mass was removed in its entirety.  This was sent to pathology for review.  Hemostasis was assured with cautery.  The wound was closed with deep dermal 3-0 Vicryl sutures and a running 4-0 Monocryl subcuticular suture.      Dr. Reed then performed their portion of the procedure with reconstruction. Please see their operative report for details.     Paradox Node Biopsy for Breast Cancer - Right  Operation performed with curative intent Yes   Tracer(s) used to identify sentinel nodes  in the upfront surgery (non-neoadjuvant) setting Radioactive tracer   Tracer(s) used to identify sentinel nodes in the neoadjuvant setting N/A   All nodes (colored or non-colored) present at the end of a dye-filled lymphatic channel were removed N/A   All significantly radioactive nodes were removed Yes   All palpably suspicious nodes were removed N/A   Biopsy-proven positive nodes marked with clips prior to chemotherapy were identified and removed N/A       Yennifer Mix MD

## 2025-02-12 NOTE — ANESTHESIA CARE TRANSFER NOTE
Patient: Ramya Dale    Procedure: Procedure(s):  bilateral mastectomies with right sentinel node biopsy and excision left breast lipoma, removal of left chest mass  Insertion tissue expander, breasts, bilateral       Diagnosis: Ductal carcinoma in situ (DCIS) of right breast [D05.11]  Breast cancer (H) [C50.919]  Diagnosis Additional Information: No value filed.    Anesthesia Type:   General     Note:    Oropharynx: oropharynx clear of all foreign objects  Level of Consciousness: awake  Oxygen Supplementation: room air    Independent Airway: airway patency satisfactory and stable  Dentition: dentition unchanged  Vital Signs Stable: post-procedure vital signs reviewed and stable  Report to RN Given: handoff report given  Patient transferred to: PACU    Handoff Report: Identifed the Patient, Identified the Reponsible Provider, Reviewed the pertinent medical history, Discussed the surgical course, Reviewed Intra-OP anesthesia mangement and issues during anesthesia, Set expectations for post-procedure period and Allowed opportunity for questions and acknowledgement of understanding      Vitals:  Vitals Value Taken Time   /62 02/12/25 1652   Temp 36.2    Pulse 75 02/12/25 1654   Resp 18 02/12/25 1654   SpO2 97 % 02/12/25 1654   Vitals shown include unfiled device data.    Electronically Signed By: CECILIA Naranjo CRNA  February 12, 2025  4:56 PM

## 2025-02-12 NOTE — BRIEF OP NOTE
[facility]    Brief Operative Note    Pre-operative diagnosis: Ductal carcinoma in situ (DCIS) of right breast [D05.11]  Breast cancer (H) [C50.919]  Post-operative diagnosis Same as pre-operative diagnosis    Procedure: Bilateral mastectomies with right sentinel node biopsy and excision left chest midaxillary lipoma    Surgeon: Surgeons and Role:  Panel 1:     * Yennifer Mix MD - Primary     * Yasmani Linares PA-C - Assisting  Dawna Eng PA-S   Panel 2:     * Dalila Reed MD - Primary  Anesthesia: General   Estimated Blood Loss: 25 mL    Drains: Defer to Dr. Reed  Specimens:   ID Type Source Tests Collected by Time Destination   1 : right breast mastectomy. short stitch superior, long lateral Mastectomy, Simple Breast, Right SURGICAL PATHOLOGY EXAM Yennifer Mix MD 2/12/2025  2:02 PM    2 : right axillary sentinal lymph node #1 Tissue Lymph Node(s), Axillary, Right SURGICAL PATHOLOGY EXAM Yennifer Mix MD 2/12/2025  2:08 PM    3 : right axillary sentinal lymph node #2 Tissue Lymph Node(s), Axillary, Right SURGICAL PATHOLOGY EXAM Yennifer Mix MD 2/12/2025  2:09 PM    4 : LEFT BREAST MASTECTOMY, SHORT STITCH SUPERIOR, LONG LATERAL Mastectomy, Simple Breast, Left SURGICAL PATHOLOGY EXAM Yennifer Mix MD 2/12/2025  2:55 PM    5 : LEFT CHEST WALL SOFT TISSUE MASS Tissue Chest SURGICAL PATHOLOGY EXAM Yennifer Mix MD 2/12/2025  3:04 PM      Findings: Bilateral mastectomies with right sentinel node biopsy and excision left chest midaxillary lipoma  Complications: None.  Implants: Defer to Dr. Derek POMPA-S

## 2025-02-13 VITALS
TEMPERATURE: 98.1 F | WEIGHT: 252.2 LBS | HEART RATE: 57 BPM | OXYGEN SATURATION: 100 % | RESPIRATION RATE: 20 BRPM | SYSTOLIC BLOOD PRESSURE: 111 MMHG | BODY MASS INDEX: 46.41 KG/M2 | HEIGHT: 62 IN | DIASTOLIC BLOOD PRESSURE: 61 MMHG

## 2025-02-13 PROCEDURE — 250N000013 HC RX MED GY IP 250 OP 250 PS 637: Performed by: PHYSICIAN ASSISTANT

## 2025-02-13 PROCEDURE — 250N000013 HC RX MED GY IP 250 OP 250 PS 637: Performed by: PLASTIC SURGERY

## 2025-02-13 RX ORDER — OXYCODONE HYDROCHLORIDE 5 MG/1
5 TABLET ORAL EVERY 4 HOURS PRN
Qty: 20 TABLET | Refills: 0 | Status: SHIPPED | OUTPATIENT
Start: 2025-02-13 | End: 2025-02-25

## 2025-02-13 RX ORDER — METHOCARBAMOL 500 MG/1
500 TABLET, FILM COATED ORAL 3 TIMES DAILY PRN
Qty: 20 TABLET | Refills: 0 | Status: SHIPPED | OUTPATIENT
Start: 2025-02-13 | End: 2025-02-25

## 2025-02-13 RX ADMIN — METHOCARBAMOL 500 MG: 500 TABLET ORAL at 00:42

## 2025-02-13 RX ADMIN — OXYCODONE HYDROCHLORIDE 5 MG: 5 TABLET ORAL at 09:55

## 2025-02-13 RX ADMIN — METHOCARBAMOL 500 MG: 500 TABLET ORAL at 06:26

## 2025-02-13 RX ADMIN — ACETAMINOPHEN 975 MG: 325 TABLET, FILM COATED ORAL at 07:10

## 2025-02-13 RX ADMIN — SENNOSIDES AND DOCUSATE SODIUM 1 TABLET: 50; 8.6 TABLET ORAL at 09:55

## 2025-02-13 ASSESSMENT — ACTIVITIES OF DAILY LIVING (ADL)
ADLS_ACUITY_SCORE: 49
ADLS_ACUITY_SCORE: 51
ADLS_ACUITY_SCORE: 49
ADLS_ACUITY_SCORE: 51
ADLS_ACUITY_SCORE: 49
ADLS_ACUITY_SCORE: 53
ADLS_ACUITY_SCORE: 49
ADLS_ACUITY_SCORE: 51
ADLS_ACUITY_SCORE: 51
ADLS_ACUITY_SCORE: 49

## 2025-02-13 NOTE — PROGRESS NOTES
Date & Time: 2/12/25 [id number]  Surgery/POD#: POD 1 Bilateral mastectomies with biopsy and tissue expander  Behavior & Aggression: green  Fall Risk: yes  Orientation: A&Ox4  ABNL VS/O2: VSS on RA   ABNL Labs: n/a  Pain Management: Scheduled robaxin, tylenol and PRN oxycodone, Ice  Bowel/Bladder: Continent  Drains: 4 Jps, stripped q8  Wounds/incisions: Chest dressing place  Diet: Regular  Activity Level: SBA with IV pole   Tests/Procedures: n/a  Anticipated  DC Date: pending 2/13 home  Significant Information: education started with pts mother who will be helping with some of the care after discharge,  will be here in the morning and will need education in the AM

## 2025-02-13 NOTE — PROGRESS NOTES
Patient discharged home with .  Discussed AVS with patient. All questions and concerns related to discharge were addressed. All patient belongings, Rx and AVS sent home with patient.     Education provided regarding ALEXANDRA drain cares and output recording, all other questions were answered at this time.

## 2025-02-13 NOTE — PROGRESS NOTES
Plastic Surgery    Patient doing well. Pain controlled with narcotics. Has been up to restroom.    PE: Temp: 98.4  F (36.9  C) Temp src: Oral BP: 119/65 Pulse: 70   Resp: 18 SpO2: 96 % O2 Device: None (Room air)      Incisions intact, mastectomy flaps viable, drain functioning    A/P:  Continue to increase activity. Discharge later today.    MD Derek [facility]   [phone] (cell)

## 2025-02-13 NOTE — ANESTHESIA POSTPROCEDURE EVALUATION
Patient: Ramya Dale    Procedure: Procedure(s):  bilateral mastectomies with right sentinel node biopsy and excision left breast lipoma, removal of left chest mass  Insertion tissue expander, breasts, bilateral       Anesthesia Type:  General    Note:  Disposition: Outpatient   Postop Pain Control: Uneventful            Sign Out: Well controlled pain   PONV: No   Neuro/Psych: Uneventful            Sign Out: Acceptable/Baseline neuro status   Airway/Respiratory: Uneventful            Sign Out: Acceptable/Baseline resp. status   CV/Hemodynamics: Uneventful            Sign Out: Acceptable CV status   Other NRE:    DID A NON-ROUTINE EVENT OCCUR? No           Last vitals:  Vitals Value Taken Time   /65 02/12/25 1745   Temp 36.8  C (98.2  F) 02/12/25 1745   Pulse 70 02/12/25 1756   Resp 8 02/12/25 1756   SpO2 100 % 02/12/25 1757   Vitals shown include unfiled device data.    Electronically Signed By: Antonia Sin  February 12, 2025  9:24 PM

## 2025-02-13 NOTE — PROGRESS NOTES
"SUBJECTIVE  No acute overnight events. Overall doing well. No BM yet. Not ambulating yet. Denies pain, CP, SOB, nausea or vomiting. Tolerating fluids, hasn't had much to eat yet.     OBJECTIVE  /61 (BP Location: Right arm)   Pulse 57   Temp 98.1  F (36.7  C) (Oral)   Resp 20   Ht 1.575 m (5' 2\")   Wt 114.4 kg (252 lb 3.2 oz)   LMP 12/09/2024   SpO2 100%   BMI 46.13 kg/m      Cardiac: RRR  Chest: Chest binder with two drains, one on each breast, with minimal serosanguinous fluid.    Pulm: No increased WOB    ASSESSMENT AND PLAN  Ramya Dale is a 43yo F s/p bilateral mastectomy. Overall doing well. Plan is to discharge home.  "

## 2025-02-13 NOTE — PLAN OF CARE
Goal Outcome Evaluation:  Date & Time: 2/12/25 9966-0879  Surgery/POD#: POD 0 Bilateral mastectomies with biopsy and tissue expander  Behavior & Aggression: green  Fall Risk: yes  Orientation: A&Ox4  ABNL VS/O2: VSS on RA   ABNL Labs: n/a  Pain Management: Scheduled robaxin, tylenol and PRN oxycodone, Ice  Bowel/Bladder: Continent  Drains: 4 Jps, stripped q8  Wounds/incisions: Chest dressing place  Diet: reg  Activity Level: SBA with IV pole   Tests/Procedures: n/a  Anticipated  DC Date: pending 2/13 home  Significant Information: education started with pts mother who will be helping with some of the care after discharge,  will be here in the morning and will need education in the AM

## 2025-02-14 ENCOUNTER — TELEPHONE (OUTPATIENT)
Dept: SURGERY | Facility: CLINIC | Age: 45
End: 2025-02-14
Payer: COMMERCIAL

## 2025-02-14 NOTE — TELEPHONE ENCOUNTER
Patient had a mastectomy 2/122/25 SEW and is now having a rash from neck to trunk, no mouth or throat swelling, no fever    Please call    Phone: [phone]   Message ok

## 2025-02-19 ENCOUNTER — VIRTUAL VISIT (OUTPATIENT)
Dept: FAMILY MEDICINE | Facility: CLINIC | Age: 45
End: 2025-02-19
Payer: COMMERCIAL

## 2025-02-19 ENCOUNTER — NURSE TRIAGE (OUTPATIENT)
Dept: FAMILY MEDICINE | Facility: CLINIC | Age: 45
End: 2025-02-19

## 2025-02-19 DIAGNOSIS — T78.40XA ALLERGIC REACTION TO DRUG, INITIAL ENCOUNTER: Primary | ICD-10-CM

## 2025-02-19 PROCEDURE — 98005 SYNCH AUDIO-VIDEO EST LOW 20: CPT | Performed by: FAMILY MEDICINE

## 2025-02-19 RX ORDER — PREDNISONE 20 MG/1
TABLET ORAL
Qty: 5 TABLET | Refills: 0 | Status: SHIPPED | OUTPATIENT
Start: 2025-02-19

## 2025-02-19 NOTE — PROGRESS NOTES
Ramya is a 44 year old who is being evaluated via a billable video visit.    How would you like to obtain your AVS? MyChart  If the video visit is dropped, the invitation should be resent by: Send to e-mail at: [email or web address]  Will anyone else be joining your video visit? No      Assessment & Plan     Allergic reaction to drug, initial encounter  Post operative rash -   Pt s/p mastectomy on 25   Rash started within 24 hours and had similar rash in remote past after her  with second child.   Appears to be rash from topical  used in the OR  Will start daily antihistamine such as zyrtec or claritin  Use benadryl prn severe itch as well  Will start low dose prednisone 20mg for 3 days and decrease to 10mg for 4 additional days  Pt will call or RTC if symptoms worsen or do not improve.   - predniSONE (DELTASONE) 20 MG tablet; Started 1 tablet daily for 3 days then 1/2 tablet for 4 days                Subjective   Ramya is a 44 year old, presenting for the following health issues:  No chief complaint on file.    History of Present Illness       Reason for visit:  Post surgical rash    She eats 2-3 servings of fruits and vegetables daily.She consumes 1 sweetened beverage(s) daily.She exercises with enough effort to increase her heart rate 10 to 19 minutes per day.  She exercises with enough effort to increase her heart rate 3 or less days per week.   She is taking medications regularly.     Post surgical rash started getting redness in the chest   Noted prior to discharge  Was able to take benadryl   Rash spread by next day from ear lobs down to waist      Current medications -   Taking the oxycodone about one time a day   Also taking the tylenol     Had rash after her daugthers    But with her third              Review of Systems  Constitutional, HEENT, cardiovascular, pulmonary, GI, , musculoskeletal, neuro, skin, endocrine and psych systems are negative, except as otherwise  noted.      Objective           Vitals:  No vitals were obtained today due to virtual visit.    Physical Exam   GENERAL: alert and no distress  EYES: Eyes grossly normal to inspection.  No discharge or erythema, or obvious scleral/conjunctival abnormalities.  RESP: No audible wheeze, cough, or visible cyanosis.    SKIN: diffuse erythematous patches on chest, neck, shoulder, upper abdomen and lateral back   NEURO: Cranial nerves grossly intact.  Mentation and speech appropriate for age.  PSYCH: Appropriate affect, tone, and pace of words          Video-Visit Details    Type of service:  Video Visit   Originating Location (pt. Location): Home    Distant Location (provider location):  On-site  Platform used for Video Visit: Bhavik  Signed Electronically by: Naida Elliott DO

## 2025-02-19 NOTE — TELEPHONE ENCOUNTER
2/13/25 rash began following surgical prep utilized on skin prior to mastectomy on 2/12/25  Extends from neck to trunk, on the patient's front and back   Red in areas, bumps in areas, and combination of the two in particular areas   Itchy  Denies fever, swelling   Benadryl and hydrocortisone cream has improved itchiness   Redness and bumps have slowly improved     Had surgical follow up today and surgeon recommended PCP advice for possible systemic treatment other than benadryl   Scheduled today with PCP for review and best next steps   Patient verbalized understanding, agreed with plan of care    Hilda Navarrete RN      Reason for Disposition   Patient wants to be seen    Additional Information   Negative: Sounds like a life-threatening emergency to the triager   Negative: Athlete's Foot suspected (i.e., itchy rash between the toes)   Negative: Insect bite(s) suspected   Negative: Jock Itch suspected (i.e., itchy rash on inner thighs near genital area)   Negative: Localized lump (or swelling) without redness or rash   Negative: MPOX SUSPECTED (e.g., direct skin contact such as sex, recent travel to [place] or [place]) and any SYMPTOMS OF MPOX (e.g., rash, fever, muscle aches, or swollen lymph nodes)   Negative: AT RISK FOR MPOX (men-who-have-sex-with-men) and POSSIBLE EXPOSURE (e.g., multiple sex partners in past 21 days) and ANY SYMPTOMS OF MPOX (e.g., rash, fever, muscle aches, or swollen lymph nodes)   Negative: Poison ivy, oak, or sumac rash suspected (e.g., itchy rash after contact with poison ivy)   Negative: Rash of female genital area (e.g., labia, vagina, vulva)   Negative: Rash of male genital area (e.g., penis, scrotum)   Negative: Redness of immunization site   Negative: Shingles suspected (i.e., painful rash, multiple small blisters grouped together in one area of body; dermatomal distribution)   Negative: Small spot, skin growth, or mole   Negative: Wound infection suspected (i.e., pain,  spreading redness, or pus; in a cut, puncture, scrape or sutured wound)   Negative: Fever and localized purple or blood-colored spots or dots that are not from injury or friction   Negative: Fever and localized rash is very painful   Negative: Patient sounds very sick or weak to the triager   Negative: Looks like a boil, infected sore, deep ulcer, or other infected rash (spreading redness, pus)   Negative: Lyme disease suspected (e.g., bull's-eye rash or tick bite / exposure)   Negative: Painful rash with multiple small blisters grouped together (i.e., dermatomal distribution or 'band' or 'stripe')   Negative: Localized rash is very painful (no fever)   Negative: Localized purple or blood-colored spots or dots that are not from injury or friction (no fever)    Protocols used: Rash or Redness - [id number]

## 2025-02-20 LAB
PATH REPORT.COMMENTS IMP SPEC: ABNORMAL
PATH REPORT.COMMENTS IMP SPEC: YES
PATH REPORT.FINAL DX SPEC: ABNORMAL
PATH REPORT.GROSS SPEC: ABNORMAL
PATH REPORT.MICROSCOPIC SPEC OTHER STN: ABNORMAL
PATH REPORT.MICROSCOPIC SPEC OTHER STN: ABNORMAL
PATH REPORT.RELEVANT HX SPEC: ABNORMAL
PATHOLOGY SYNOPTIC REPORT: ABNORMAL
PHOTO IMAGE: ABNORMAL

## 2025-02-25 ENCOUNTER — OFFICE VISIT (OUTPATIENT)
Dept: SURGERY | Facility: CLINIC | Age: 45
End: 2025-02-25
Payer: COMMERCIAL

## 2025-02-25 DIAGNOSIS — Z09 SURGICAL FOLLOW-UP CARE: Primary | ICD-10-CM

## 2025-02-25 PROCEDURE — 99024 POSTOP FOLLOW-UP VISIT: CPT | Performed by: SURGERY

## 2025-02-25 NOTE — PROGRESS NOTES
[facility] Breast Surgery Postoperative Note    S: Ramya unfortunately developed a rash following surgery and had seen Dr. Elliott and was started on antihistamine and Benadryl with low-dose prednisone.  She has otherwise been improving.    Breasts: Bilateral mastectomy incisions are healing well.  Resolving rash across her chest and abdomen.  ALEXANDRA drains with serous fluid in both drains.    Pathology: Reviewed and copy given to patient    A/P  Ramya Dale is recovering from bilateral skin sparing mastectomies with right sentinel lymph node biopsy and excision of left chest wall lipoma on 2/12/2025.  She had reconstruction with Dr. Reed.  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  In her left breast there was incidentally identified extensive ductal carcinoma in situ also measuring 7 to 8 cm ER/ID positive with clear margins.  The left chest wall soft tissue mass was a benign lipoma.    Given only DCIS in both breast there is no indication for any additional adjuvant therapy.  I would see her back for a chest wall exam in 6 months, sooner with any new concerns.    Thank you for the opportunity to help in her care.    Yennifer Mix MD  [facility]  [phone]    Please route or send letter to:  Primary Care Provider (PCP) and Referring Provider

## 2025-02-25 NOTE — PROGRESS NOTES
[facility]    Hematology/Oncology Established Patient Note      Today's Date: 3/4/2025    Reason for visit: Bilateral breast DCIS.    HISTORY OF PRESENT ILLNESS: Ramya Dale is a 44 year old female with history of hypertension who presents with the following oncologic history:  1.  12/12/2024: Screening mammogram showed calcifications in right breast.  Left breast negative.  2.  12/16/2024: Diagnostic right mammogram showed punctate calcifications in segmental distribution in right lower inner breast, middle depth, measuring 3.9 x 2.9 x 1.8 cm.  3.  12/26/2024: Right breast biopsy at 3:00, 5 cm from nipple showed grade 2 DCIS with cancerization of lobules associated with microcalcifications, negative for invasive malignancy, ER strongly positive at %, GA strongly positive at %.  4. 2/12/2025: Bilateral mastectomies and right axillary sentinel lymph node excision under care of Dr. Yennifer Mix and immediate reconstruction with tissue expanders.  Pathology showed right breast extensive DCIS, grade 2, at least 7-8 cm, margins negative; total 2 right axillary SLNs negative; left breast with extensive grade 2 DCIS, at least 7-8 cm, focal LCIS, margins negative, ER positive %, GA positive 81-90%.    INTERVAL HISTORY:  Ramya reports healing well from her mastectomies and is undergoing tissue expansion.  Denies pain.      REVIEW OF SYSTEMS:   14 point ROS was reviewed and is negative other than as noted above in HPI.       HOME MEDICATIONS:  Current Outpatient Medications   Medication Sig Dispense Refill    predniSONE (DELTASONE) 20 MG tablet Started 1 tablet daily for 3 days then 1/2 tablet for 4 days 5 tablet 0    triamterene-HCTZ (DYAZIDE) 37.5-25 MG capsule Take 1 capsule by mouth every morning. 90 capsule 3         ALLERGIES:  Allergies   Allergen Reactions    Cats     Pollen Extract      Ragweed - Fall  Usually also bothered for a week in the spring.    Antiseptic Skin  Cleanser [Chlorhexidine Gluconate] Rash     Diffuse rash after  (second child) and mastectomy         PAST MEDICAL HISTORY:  Past Medical History:   Diagnosis Date    Abnormal Pap smear         LSIL (low grade squamous intraepithelial lesion) on Pap smear 2011    colp - WNl     Gynecologic history:  , age of 1st pregnancy at 34,  her children; no HRT use; used OCPs for 8-9 years.    PAST SURGICAL HISTORY:  Past Surgical History:   Procedure Laterality Date     SECTION N/A 2014    Procedure:  SECTION;  Surgeon: Maribel Nixon MD;  Location: [facility] L[facility]     SECTION N/A 2017    Procedure:  SECTION;  Surgeon: Diane Anderson MD;  Location: [facility] L+[facility]     SECTION N/A 2020    Procedure: REPEAT  SECTION;  Surgeon: Yadi Ricci MD;  Location: [facility] L+[facility]    INSERT TISSUE EXPANDER BREAST BILATERAL Bilateral 2025    Procedure: Insertion tissue expander, breasts, bilateral;  Surgeon: Dalila Reed MD;  Location: [facility] OR    MASTECTOMY SIMPLE, SENTINEL NODE, COMBINED Bilateral 2025    Procedure: bilateral mastectomies with right sentinel node biopsy and excision left breast lipoma, removal of left chest mass;  Surgeon: Yennifer Mix MD;  Location: [facility] OR    ZZHC EXC LESION OF TONGUE W/O CLOSURE      benign         SOCIAL HISTORY:  Social History     Socioeconomic History    Marital status:      Spouse name: Not on file    Number of children: 0    Years of education: Not on file    Highest education level: Not on file   Occupational History    Occupation: retail     Employer: [employer]     Employer: [employer]   Tobacco Use    Smoking status: Never     Passive exposure: Never    Smokeless tobacco: Never   Vaping Use    Vaping status: Never Used   Substance and Sexual Activity    Alcohol use: Yes     Comment: 2 times or less per week    Drug use: Never    Sexual activity: Yes     Partners: Male      Birth control/protection: Male Surgical   Other Topics Concern    Parent/sibling w/ CABG, MI or angioplasty before 65F 55M? No   Social History Narrative    Social Documentation:5/14/10        Balanced Diet: YES, could be better    Calcium intake: 1-2 per day    Caffeine: 0-1 per day    Exercise:  type of activity skate, aerobics;  5 times per week    Sunscreen: sometimes    Seatbelts:  Yes    Self Breast Exam:  Yes sometime    Self Testicular Exam: No    Physical/Emotional/Sexual Abuse: No    Do you feel safe in your environment? Yes        Cholesterol screen up to date: No     Eye Exam up to date: Yes    Dental Exam up to date: No    Pap smear up to date: Yes, PAP      NIL   4/8/2009    Mammogram up to date: Does Not Apply    Dexa Scan up to date: Does Not Apply    Colonoscopy up to date: Does Not Apply    Immunizations up to date: last td 3/17/2008    Glucose screen if over 40:  N/a        Ly Rubio [facility]                      Social Drivers of Health     Financial Resource Strain: Low Risk  (11/20/2024)    Financial Resource Strain     Within the past 12 months, have you or your family members you live with been unable to get utilities (heat, electricity) when it was really needed?: No   Food Insecurity: Low Risk  (11/20/2024)    Food Insecurity     Within the past 12 months, did you worry that your food would run out before you got money to buy more?: No     Within the past 12 months, did the food you bought just not last and you didn t have money to get more?: No   Transportation Needs: Low Risk  (11/20/2024)    Transportation Needs     Within the past 12 months, has lack of transportation kept you from medical appointments, getting your medicines, non-medical meetings or appointments, work, or from getting things that you need?: No   Physical Activity: Insufficiently Active (11/20/2024)    Exercise Vital Sign     Days of Exercise per Week: 1 day     Minutes of Exercise per Session: 20 min   Stress:  Stress Concern Present (2024)    [place] [place] [employer] - Occupational Stress Questionnaire     Feeling of Stress : To some extent   Social Connections: Unknown (2024)    Social Connection and Isolation Panel [NHANES]     Frequency of Communication with Friends and Family: Not on file     Frequency of Social Gatherings with Friends and Family: Once a week     Attends Restorationism Services: Not on file     Active Member of Clubs or Organizations: Not on file     Attends Club or Organization Meetings: Not on file     Marital Status: Not on file   Interpersonal Safety: Low Risk  (2025)    Interpersonal Safety     Do you feel physically and emotionally safe where you currently live?: Yes     Within the past 12 months, have you been hit, slapped, kicked or otherwise physically hurt by someone?: No     Within the past 12 months, have you been humiliated or emotionally abused in other ways by your partner or ex-partner?: No   Housing Stability: Low Risk  (2024)    Housing Stability     Do you have housing? : Yes     Are you worried about losing your housing?: No         FAMILY HISTORY:  Family History   Problem Relation Age of Onset    Heart Disease Paternal Grandfather     Heart Disease Maternal Grandfather     Allergies Mother     Eye Disorder Mother     Allergies Maternal Grandmother     Cerebrovascular Disease Maternal Grandmother 92    Depression Maternal Uncle          PHYSICAL EXAM:  Vital signs:  /90   Pulse 75   Resp 16   Wt 111.1 kg (245 lb)   LMP 2025 (Exact Date)   SpO2 100%   BMI 44.81 kg/m     ECO  GENERAL: No acute distress.  EYES: No scleral icterus. No overt erythema.  RESPIRATORY: No audible cough, wheezing, or labored breathing.  MUSCULOSKELETAL: Range of motion in the neck, shoulders, and arms appear normal.  SKIN: No overt rashes, discolorations, or lesions over the face and neck.  NEUROLOGIC: Alert.  No overt tremors.  PSYCHIATRIC: Normal  affect and mood.  Does not appear anxious.       LABS:  CBC RESULTS:   Recent Labs   Lab Test 11/20/24  1127   WBC 9.3   RBC 4.76   HGB 12.3   HCT 39.0   MCV 82   MCH 25.8*   MCHC 31.5   RDW 13.7          Recent Labs   Lab Test 12/20/24  1148 11/20/24  1127    141   POTASSIUM 3.9 4.4   CHLORIDE 101 108*   CO2 24 23   ANIONGAP 14 10   GLC 96 94   BUN 12.9 11.1   CR 0.79 0.76   MARRY 9.8 9.3         PATHOLOGY:  Reviewed as per HPI.    IMAGING:  Reviewed as per HPI.    ASSESSMENT/PLAN:  Ramya Dale is a 44 year old female with the following issues:  1.  Stage 0, cTis-N0-M0, grade 2 ductal carcinoma in situ of right lower inner breast and left breast, ER positive, OH positive  - I reviewed with Ramya that her final pathology showed extensive grade 2 DCIS of both breasts.  Margins negative.  No lymph node involvement on the right.  The left breast DCIS was not apparent on her breast imaging.  - Given absence of invasive cancer and that she underwent bilateral mastectomies with clear margins, there is no indication for adjuvant hormone blockade therapy or radiation.  -- She will be following up with Dr. Mix for chest wall exam.    2. Hypertension  --Blood pressure stable.  Continue Dyazide.    Return as needed.    Sydney Wray MD  [facility] Hematology/Oncology    Total time spent today: 30 minutes in chart review, patient evaluation, counseling, documentation, test and/or medication/prescription orders, and coordination of care.      The longitudinal plan of care for the diagnosis(es)/condition(s) as documented were addressed during this visit. Due to the added complexity in care, I will continue to support Ramya in the subsequent management and with ongoing continuity of care.

## 2025-02-25 NOTE — LETTER
February 27, 2025          Naida Elliott DO  [address] [facility]  [address]  [place] [address]      RE:   Ramya Dale 1980      Dear Colleague,    Thank you for referring your patient, Ramya Dale, to [facility]. Please see a copy of my visit note below.    Ramya christina developed a rash following surgery and had seen Dr. Elliott and was started on antihistamine and Benadryl with low-dose prednisone.  She has otherwise been improving.     Breasts: Bilateral mastectomy incisions are healing well.  Resolving rash across her chest and abdomen.  ALEXANDRA drains with serous fluid in both drains.     Pathology: Reviewed and copy given to patient     A/P  Ramya Dale is recovering from bilateral skin sparing mastectomies with right sentinel lymph node biopsy and excision of left chest wall lipoma on 2/12/2025.  She had reconstruction with Dr. Reed.  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  In her left breast there was incidentally identified extensive ductal carcinoma in situ also measuring 7 to 8 cm ER/OH positive with clear margins.  The left chest wall soft tissue mass was a benign lipoma.     Given only DCIS in both breast there is no indication for any additional adjuvant therapy.  I would see her back for a chest wall exam in 6 months, sooner with any new concerns.    Again, thank you for allowing me to participate in the care of your patient.      Sincerely,      Yennifer Mix MD

## 2025-03-04 ENCOUNTER — ONCOLOGY VISIT (OUTPATIENT)
Dept: ONCOLOGY | Facility: CLINIC | Age: 45
End: 2025-03-04
Attending: INTERNAL MEDICINE
Payer: COMMERCIAL

## 2025-03-04 VITALS
WEIGHT: 245 LBS | RESPIRATION RATE: 16 BRPM | SYSTOLIC BLOOD PRESSURE: 121 MMHG | OXYGEN SATURATION: 100 % | BODY MASS INDEX: 44.81 KG/M2 | DIASTOLIC BLOOD PRESSURE: 90 MMHG | HEART RATE: 75 BPM

## 2025-03-04 DIAGNOSIS — D05.12 DUCTAL CARCINOMA IN SITU (DCIS) OF LEFT BREAST: ICD-10-CM

## 2025-03-04 DIAGNOSIS — D05.11 DUCTAL CARCINOMA IN SITU (DCIS) OF RIGHT BREAST: Primary | ICD-10-CM

## 2025-03-04 PROCEDURE — 99214 OFFICE O/P EST MOD 30 MIN: CPT | Performed by: INTERNAL MEDICINE

## 2025-03-04 PROCEDURE — G2211 COMPLEX E/M VISIT ADD ON: HCPCS | Performed by: INTERNAL MEDICINE

## 2025-03-04 PROCEDURE — 99213 OFFICE O/P EST LOW 20 MIN: CPT | Performed by: INTERNAL MEDICINE

## 2025-03-04 ASSESSMENT — PAIN SCALES - GENERAL: PAINLEVEL_OUTOF10: NO PAIN (0)

## 2025-03-04 NOTE — LETTER
3/4/2025      Ramya Dale  [address] [facility] [address]      Dear Colleague,    Thank you for referring your patient, Ramya Dale, to the [facility]. Please see a copy of my visit note below.    [facility]    Hematology/Oncology Established Patient Note      Today's Date: 3/4/2025    Reason for visit: Bilateral breast DCIS.    HISTORY OF PRESENT ILLNESS: Ramya Dale is a 44 year old female with history of hypertension who presents with the following oncologic history:  1.  12/12/2024: Screening mammogram showed calcifications in right breast.  Left breast negative.  2.  12/16/2024: Diagnostic right mammogram showed punctate calcifications in segmental distribution in right lower inner breast, middle depth, measuring 3.9 x 2.9 x 1.8 cm.  3.  12/26/2024: Right breast biopsy at 3:00, 5 cm from nipple showed grade 2 DCIS with cancerization of lobules associated with microcalcifications, negative for invasive malignancy, ER strongly positive at %, OH strongly positive at %.  4. 2/12/2025: Bilateral mastectomies and right axillary sentinel lymph node excision under care of Dr. Yennifer Mix and immediate reconstruction with tissue expanders.  Pathology showed right breast extensive DCIS, grade 2, at least 7-8 cm, margins negative; total 2 right axillary SLNs negative; left breast with extensive grade 2 DCIS, at least 7-8 cm, focal LCIS, margins negative, ER positive %, OH positive 81-90%.    INTERVAL HISTORY:  Ramya reports healing well from her mastectomies and is undergoing tissue expansion.  Denies pain.      REVIEW OF SYSTEMS:   14 point ROS was reviewed and is negative other than as noted above in HPI.       HOME MEDICATIONS:  Current Outpatient Medications   Medication Sig Dispense Refill     predniSONE (DELTASONE) 20 MG tablet Started 1 tablet daily for 3 days then 1/2 tablet for 4 days 5 tablet 0     triamterene-HCTZ  (DYAZIDE) 37.5-25 MG capsule Take 1 capsule by mouth every morning. 90 capsule 3         ALLERGIES:  Allergies   Allergen Reactions     Cats      Pollen Extract      Ragweed - Fall  Usually also bothered for a week in the spring.     Antiseptic Skin Cleanser [Chlorhexidine Gluconate] Rash     Diffuse rash after  (second child) and mastectomy         PAST MEDICAL HISTORY:  Past Medical History:   Diagnosis Date     Abnormal Pap smear          LSIL (low grade squamous intraepithelial lesion) on Pap smear 2011    colp - WNl     Gynecologic history:  , age of 1st pregnancy at 34,  her children; no HRT use; used OCPs for 8-9 years.    PAST SURGICAL HISTORY:  Past Surgical History:   Procedure Laterality Date      SECTION N/A 2014    Procedure:  SECTION;  Surgeon: Maribel Nixon MD;  Location: [facility]      SECTION N/A 2017    Procedure:  SECTION;  Surgeon: Diane Anderson MD;  Location: [facility] L[facility]      SECTION N/A 2020    Procedure: REPEAT  SECTION;  Surgeon: Yadi Ricci MD;  Location: [facility]     INSERT TISSUE EXPANDER BREAST BILATERAL Bilateral 2025    Procedure: Insertion tissue expander, breasts, bilateral;  Surgeon: Dalila Reed MD;  Location: [facility] OR     MASTECTOMY SIMPLE, SENTINEL NODE, COMBINED Bilateral 2025    Procedure: bilateral mastectomies with right sentinel node biopsy and excision left breast lipoma, removal of left chest mass;  Surgeon: Yennifer Mix MD;  Location:  OR     ZZ EXC LESION OF TONGUE W/O CLOSURE      benign         SOCIAL HISTORY:  Social History     Socioeconomic History     Marital status:      Spouse name: Not on file     Number of children: 0     Years of education: Not on file     Highest education level: Not on file   Occupational History     Occupation: retail     Employer: [employer]     Employer: [employer]   Tobacco Use     Smoking  status: Never     Passive exposure: Never     Smokeless tobacco: Never   Vaping Use     Vaping status: Never Used   Substance and Sexual Activity     Alcohol use: Yes     Comment: 2 times or less per week     Drug use: Never     Sexual activity: Yes     Partners: Male     Birth control/protection: Male Surgical   Other Topics Concern     Parent/sibling w/ CABG, MI or angioplasty before 65F 55M? No   Social History Narrative    Social Documentation:5/14/10        Balanced Diet: YES, could be better    Calcium intake: 1-2 per day    Caffeine: 0-1 per day    Exercise:  type of activity skate, aerobics;  5 times per week    Sunscreen: sometimes    Seatbelts:  Yes    Self Breast Exam:  Yes sometime    Self Testicular Exam: No    Physical/Emotional/Sexual Abuse: No    Do you feel safe in your environment? Yes        Cholesterol screen up to date: No     Eye Exam up to date: Yes    Dental Exam up to date: No    Pap smear up to date: Yes, PAP      NIL   4/8/2009    Mammogram up to date: Does Not Apply    Dexa Scan up to date: Does Not Apply    Colonoscopy up to date: Does Not Apply    Immunizations up to date: last td 3/17/2008    Glucose screen if over 40:  N/a        [facility]                      Social Drivers of Health     Financial Resource Strain: Low Risk  (11/20/2024)    Financial Resource Strain      Within the past 12 months, have you or your family members you live with been unable to get utilities (heat, electricity) when it was really needed?: No   Food Insecurity: Low Risk  (11/20/2024)    Food Insecurity      Within the past 12 months, did you worry that your food would run out before you got money to buy more?: No      Within the past 12 months, did the food you bought just not last and you didn t have money to get more?: No   Transportation Needs: Low Risk  (11/20/2024)    Transportation Needs      Within the past 12 months, has lack of transportation kept you from medical appointments, getting  your medicines, non-medical meetings or appointments, work, or from getting things that you need?: No   Physical Activity: Insufficiently Active (2024)    Exercise Vital Sign      Days of Exercise per Week: 1 day      Minutes of Exercise per Session: 20 min   Stress: Stress Concern Present (2024)    [place] [employer] - Occupational Stress Questionnaire      Feeling of Stress : To some extent   Social Connections: Unknown (2024)    Social Connection and Isolation Panel [NHANES]      Frequency of Communication with Friends and Family: Not on file      Frequency of Social Gatherings with Friends and Family: Once a week      Attends Temple Services: Not on file      Active Member of Clubs or Organizations: Not on file      Attends Club or Organization Meetings: Not on file      Marital Status: Not on file   Interpersonal Safety: Low Risk  (2025)    Interpersonal Safety      Do you feel physically and emotionally safe where you currently live?: Yes      Within the past 12 months, have you been hit, slapped, kicked or otherwise physically hurt by someone?: No      Within the past 12 months, have you been humiliated or emotionally abused in other ways by your partner or ex-partner?: No   Housing Stability: Low Risk  (2024)    Housing Stability      Do you have housing? : Yes      Are you worried about losing your housing?: No         FAMILY HISTORY:  Family History   Problem Relation Age of Onset     Heart Disease Paternal Grandfather      Heart Disease Maternal Grandfather      Allergies Mother      Eye Disorder Mother      Allergies Maternal Grandmother      Cerebrovascular Disease Maternal Grandmother 92     Depression Maternal Uncle          PHYSICAL EXAM:  Vital signs:  /90   Pulse 75   Resp 16   Wt 111.1 kg (245 lb)   LMP 2025 (Exact Date)   SpO2 100%   BMI 44.81 kg/m     ECO  GENERAL: No acute distress.  EYES: No scleral icterus. No overt  erythema.  RESPIRATORY: No audible cough, wheezing, or labored breathing.  MUSCULOSKELETAL: Range of motion in the neck, shoulders, and arms appear normal.  SKIN: No overt rashes, discolorations, or lesions over the face and neck.  NEUROLOGIC: Alert.  No overt tremors.  PSYCHIATRIC: Normal affect and mood.  Does not appear anxious.       LABS:  CBC RESULTS:   Recent Labs   Lab Test 11/20/24  1127   WBC 9.3   RBC 4.76   HGB 12.3   HCT 39.0   MCV 82   MCH 25.8*   MCHC 31.5   RDW 13.7          Recent Labs   Lab Test 12/20/24  1148 11/20/24  1127    141   POTASSIUM 3.9 4.4   CHLORIDE 101 108*   CO2 24 23   ANIONGAP 14 10   GLC 96 94   BUN 12.9 11.1   CR 0.79 0.76   MARRY 9.8 9.3         PATHOLOGY:  Reviewed as per HPI.    IMAGING:  Reviewed as per HPI.    ASSESSMENT/PLAN:  Ramya Dale is a 44 year old female with the following issues:  1.  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  Margins negative.  No lymph node involvement on the right.  The left breast DCIS was not apparent on her breast imaging.  - Given absence of invasive cancer and that she underwent bilateral mastectomies with clear margins, there is no indication for adjuvant hormone blockade therapy or radiation.  -- She will be following up with Dr. Mix for chest wall exam.    2. Hypertension  --Blood pressure stable.  Continue Dyazide.    Return as needed.    Sydney Wray MD  [facility] Hematology/Oncology    Total time spent today: 30 minutes in chart review, patient evaluation, counseling, documentation, test and/or medication/prescription orders, and coordination of care.      The longitudinal plan of care for the diagnosis(es)/condition(s) as documented were addressed during this visit. Due to the added complexity in care, I will continue to  "support Ramya in the subsequent management and with ongoing continuity of care.    Oncology Rooming Note    March 4, 2025 12:57 PM   Ramya Dale is a 44 year old female who presents for:    Chief Complaint   Patient presents with     Oncology Clinic Visit     Initial Vitals: /90   Pulse 75   Resp 16   Wt 111.1 kg (245 lb)   LMP 01/22/2025 (Exact Date)   SpO2 100%   BMI 44.81 kg/m   Estimated body mass index is 44.81 kg/m  as calculated from the following:    Height as of 2/12/25: 1.575 m (5' 2\").    Weight as of this encounter: 111.1 kg (245 lb). Body surface area is 2.2 meters squared.  No Pain (0) Comment: Data Unavailable   Patient's last menstrual period was 01/22/2025 (exact date).  Allergies reviewed: Yes  Medications reviewed: Yes    Medications: Medication refills not needed today.  Pharmacy name entered into [email or web address]: [facility] [id number] [place] [address]    Frailty Screening:   Is the patient here for a new oncology consult visit in cancer care? 2. No    PHQ9:  Did this patient require a PHQ9?: No      Clinical concerns: no       Shari J. Schoenberger, [facility]                Again, thank you for allowing me to participate in the care of your patient.        Sincerely,        Sydney Wray MD    Electronically signed"

## 2025-03-04 NOTE — PROGRESS NOTES
"Oncology Rooming Note    March 4, 2025 12:57 PM   Ramya Dale is a 44 year old female who presents for:    Chief Complaint   Patient presents with    Oncology Clinic Visit     Initial Vitals: /90   Pulse 75   Resp 16   Wt 111.1 kg (245 lb)   LMP 01/22/2025 (Exact Date)   SpO2 100%   BMI 44.81 kg/m   Estimated body mass index is 44.81 kg/m  as calculated from the following:    Height as of 2/12/25: 1.575 m (5' 2\").    Weight as of this encounter: 111.1 kg (245 lb). Body surface area is 2.2 meters squared.  No Pain (0) Comment: Data Unavailable   Patient's last menstrual period was 01/22/2025 (exact date).  Allergies reviewed: Yes  Medications reviewed: Yes    Medications: Medication refills not needed today.  Pharmacy name entered into [employer]: [id number] [place] [address]    Frailty Screening:   Is the patient here for a new oncology consult visit in cancer care? 2. No    PHQ9:  Did this patient require a PHQ9?: No      Clinical concerns: no       Shari J. Schoenberger, CMA              "

## 2025-03-13 ENCOUNTER — MYC MEDICAL ADVICE (OUTPATIENT)
Dept: SURGERY | Facility: CLINIC | Age: 45
End: 2025-03-13
Payer: COMMERCIAL

## 2025-03-13 DIAGNOSIS — I89.0 LYMPHEDEMA: Primary | ICD-10-CM

## 2025-03-19 ENCOUNTER — THERAPY VISIT (OUTPATIENT)
Dept: PHYSICAL THERAPY | Facility: CLINIC | Age: 45
End: 2025-03-19
Attending: SURGERY
Payer: COMMERCIAL

## 2025-03-19 DIAGNOSIS — L76.82 AXILLARY WEB SYNDROME: Primary | ICD-10-CM

## 2025-03-19 DIAGNOSIS — L90.5 AXILLARY WEB SYNDROME: Primary | ICD-10-CM

## 2025-03-19 DIAGNOSIS — L90.5 SCAR CONDITIONS AND FIBROSIS OF SKIN: ICD-10-CM

## 2025-03-19 DIAGNOSIS — R53.1 DECREASED STRENGTH: ICD-10-CM

## 2025-03-19 DIAGNOSIS — R52 PAIN: ICD-10-CM

## 2025-03-19 DIAGNOSIS — M25.619 DECREASED RANGE OF MOTION (ROM) OF SHOULDER: ICD-10-CM

## 2025-03-19 DIAGNOSIS — I89.0 LYMPHEDEMA: ICD-10-CM

## 2025-03-19 PROCEDURE — 97162 PT EVAL MOD COMPLEX 30 MIN: CPT | Mod: GP | Performed by: PHYSICAL THERAPIST

## 2025-03-19 PROCEDURE — 97140 MANUAL THERAPY 1/> REGIONS: CPT | Mod: GP | Performed by: PHYSICAL THERAPIST

## 2025-03-19 PROCEDURE — 97110 THERAPEUTIC EXERCISES: CPT | Mod: GP | Performed by: PHYSICAL THERAPIST

## 2025-03-19 PROCEDURE — 97112 NEUROMUSCULAR REEDUCATION: CPT | Mod: GP | Performed by: PHYSICAL THERAPIST

## 2025-03-19 NOTE — PROGRESS NOTES
"PHYSICAL THERAPY EVALUATION  Type of Visit: Evaluation       Fall Risk Screen:  Fall screen completed by: PT  Have you fallen 2 or more times in the past year?: No  Have you fallen and had an injury in the past year?: No  Is patient a fall risk?: No    Subjective         Presenting condition or subjective complaint: post mastectomy pt/ot  Date of onset: 02/12/25 (B mastectomies and R SLND;  date of order = 3/13/2025 Yennifer Levine MD ([facility]))    Relevant medical history: Cancer; High blood pressure; Overweight R breast extensive ductal carcinoma in situ, grade 2 8cm with clear margins and negative SN; L breast extensive ductal carcinoma insitu 8cm ER/NC + with clear margins, benign lipoma 2/12/2025 s/p B mastectomies with R SLND and reconstruction with expanders (Dr. Reed); only DCIS in both breasts no indication for any additional adjuvant therapy, csections (2020,2017,2014, excision benign lesion L trunk and benign tongue lesion; R thumb tendon repair 2022; does report tendency for \"puffy hands and feet during summer\" ; s/p 3 fills to date; obesity with BMI = 45, HTN  Dates & types of surgery: mastectomy - feb 2025    Prior diagnostic imaging/testing results:       Prior therapy history for the same diagnosis, illness or injury: No      Orders: 3/13/2025  Yennifer Mix MD  Diagnosis: Lymphedema [I89.0]  - Primary ; Impaired ROM, scar adherence, pain; \"s/p cording R axilla after mastectomy SLNB  Insurance: [employer]    Prior Level of Function  Transfers: Independent  Ambulation: Independent  ADL: Independent  IADL: Housekeeping, Meal preparation, Yard work, delegating heavier work due to discomfort R>L chest and upper body mm fatigue  Exercise: enjoyed walking during lunch 5-10'     Living Environment  Social support: With family members ;  = [place] (kid =10, 8, 5 2 with autism)  Type of home: House; 1 level; Basement   Stairs to enter the home: Yes 6 Is there a railing: " Yes     Ramp: No   Stairs inside the home: Yes 10 Is there a railing: Yes     Help at home: None  Equipment owned:   elliptical (currently inaccessible)  and stationary bike; does not use; also owns elastic bands and 5lbs weights    Employment: Yes ; Full time at home 90% seated desk  Hobbies/Interests: reading [employer] skating music    Patient goals for therapy: full range of motion in arms    Pain assessment: Pain present R >L axilla and chest     Objective       EDEMA EVALUATION  Additional history:  Body part affected by edema:  R>L shoulder tightness and chest discomfort/pain   If cancer related, treatment: mastectomy  If not cancer related, problems with veins or cause of swelling:    Distance able to walk: 5 miles?  Time able to stand: indefinitely  Sensation problems in hands/feet: No    Edema etiology: Cancer with lymph node dissection, Surgery    FUNCTIONAL SCALES   Shoulder Pain and Disability Index (score out of 100). A higher score indicates greater impairment:  35% impairment  FACIT = 36/52; <40 demonstrates fatigue beyond normal    Cognitive Status Examination  Orientation: Oriented to person, place and time   Level of Consciousness: Alert  Follows Commands and Answers Questions: 100% of the time  Personal Safety and Judgement: Intact    EDEMA  Skin Condition:  slight increased fullness throughout L UE / hand; L handed ; R>L lateral trunk fullness  Scar: Yes  B mastectomy scars  Capillary Refill: Symmetrical  Stemmer Sign: +  Ulceration: No    GIRTH MEASUREMENTS: Refer to separate girth measurement flowsheet for specific measurements.    VOLUME UE weight = 245lbs  Right UE Total Volume (mL): 3277.33  Left UE Total Volume (mL): 3224.23  UE Limb Comparison: R>L = 1.6%; L handed; mild increased fullness throughout L UE              RANGE OF MOTION:  seated shoulder flexion L = 145; R = 125  STRENGTH:  L =65lbs; R= 60lbs ; L handed  POSTURE:  mild forward head, B shoulder  "IR/protraction  PALPATION: R axillary cording  BED MOBILITY:  sleeping in bed but iimpaired restroative sleep d/t unable to sleep on side yet  TRANSFERS:  Independent  GAIT/LOCOMOTION: previously ambulated at least 3 miles but recently tolerating 20' walk limited first from chest mm fatigue  BALANCE:  SLS >20 +   SENSATION:  decreased B mastectomy locations    Assessment & Plan   CLINICAL IMPRESSIONS  Medical Diagnosis: Lymphedema (I89.0)  - Primary ; Impaired ROM, scar adherence, pain; \"s/p cording R axilla after mastectomy SLNB    Treatment Diagnosis: R axillary web syndrome, lymphedema, fibrosis, decreased shoulder ROM, impaired muscle function, pain   Impression/Assessment: Patient is a 44 year old female with limited shoulder motion, pain limiting mobilty complaints.  The following significant findings have been identified: Pain, Decreased ROM/flexibility, Decreased strength, Edema, Impaired muscle performance, Decreased activity tolerance, and Impaired posture. These impairments interfere with their ability to perform self care tasks, work tasks, recreational activities, household chores, and community mobility as compared to previous level of function.     Clinical Decision Making (Complexity):  Clinical Presentation: Evolving/Changing  Clinical Presentation Rationale: based on medical and personal factors listed in PT evaluation  Clinical Decision Making (Complexity): Moderate complexity    PLAN OF CARE  Treatment Interventions:  Interventions: Manual Therapy, Neuromuscular Re-education, Therapeutic Exercise, Self-Care/Home Management    Long Term Goals     PT Goal 1  Goal Identifier: Home program  Goal Description: Patient will be independent in home program to manage condition of cancer related decreased shoulder ROM, strength and axillary web syndrome as well as in lymphedema risk reduction behaviors  Rationale: to maximize safety and independence with performance of ADLs and functional tasks;to maximize " safety and independence within the home;to maximize safety and independence within the community;to maximize safety and independence with transportation;to maximize safety and independence with self cares  Target Date: 06/16/25  PT Goal 2  Goal Identifier: spadi  Goal Description: Patient will demonstrate an 8 point decrease on the Spadi demonstrating a significant improvement in both pain and function.  Rationale: to maximize safety and independence with performance of ADLs and functional tasks;to maximize safety and independence within the home;to maximize safety and independence within the community;to maximize safety and independence with transportation;to maximize safety and independence with self cares  Goal Progress: eval = 35% impairement  Target Date: 05/18/25  PT Goal 3  Goal Identifier: FACIT  Goal Description: Pt to report decreased overall cancer related fatigue by scoring at least 40/52 on FACIT  Rationale: to maximize safety and independence with performance of ADLs and functional tasks;to maximize safety and independence within the home;to maximize safety and independence within the community;to maximize safety and independence with self cares  Goal Progress: eval = 36/52  Target Date: 05/18/25      Frequency of Treatment: 1x/week x 6 weeks then 2x/month  Duration of Treatment: 3 months    Education Assessment:   Learner/Method: Patient  Education Comments:  [place]    Risks and benefits of evaluation/treatment have been explained.   Patient/Family/caregiver agrees with Plan of Care.     Evaluation Time:     PT Eval, Moderate Complexity Minutes (18527): 30     Signing Clinician: Zulema Rawls PT

## 2025-03-24 ENCOUNTER — THERAPY VISIT (OUTPATIENT)
Dept: PHYSICAL THERAPY | Facility: CLINIC | Age: 45
End: 2025-03-24
Payer: COMMERCIAL

## 2025-03-24 DIAGNOSIS — I89.0 LYMPHEDEMA: Primary | ICD-10-CM

## 2025-03-24 DIAGNOSIS — M25.611 DECREASED RANGE OF MOTION OF RIGHT SHOULDER: ICD-10-CM

## 2025-03-24 DIAGNOSIS — L90.5 AXILLARY WEB SYNDROME: ICD-10-CM

## 2025-03-24 DIAGNOSIS — R52 PAIN: ICD-10-CM

## 2025-03-24 DIAGNOSIS — R53.1 DECREASED STRENGTH: ICD-10-CM

## 2025-03-24 DIAGNOSIS — L76.82 AXILLARY WEB SYNDROME: ICD-10-CM

## 2025-03-24 DIAGNOSIS — L90.5 SCAR CONDITIONS AND FIBROSIS OF SKIN: ICD-10-CM

## 2025-03-24 PROCEDURE — 97112 NEUROMUSCULAR REEDUCATION: CPT | Mod: GP | Performed by: PHYSICAL THERAPIST

## 2025-03-24 PROCEDURE — 97140 MANUAL THERAPY 1/> REGIONS: CPT | Mod: GP | Performed by: PHYSICAL THERAPIST

## 2025-03-24 PROCEDURE — 97110 THERAPEUTIC EXERCISES: CPT | Mod: GP | Performed by: PHYSICAL THERAPIST

## 2025-04-02 ENCOUNTER — THERAPY VISIT (OUTPATIENT)
Dept: PHYSICAL THERAPY | Facility: CLINIC | Age: 45
End: 2025-04-02
Payer: COMMERCIAL

## 2025-04-02 DIAGNOSIS — R52 PAIN: ICD-10-CM

## 2025-04-02 DIAGNOSIS — R53.1 DECREASED STRENGTH: ICD-10-CM

## 2025-04-02 DIAGNOSIS — I89.0 LYMPHEDEMA: ICD-10-CM

## 2025-04-02 DIAGNOSIS — L90.5 SCAR CONDITIONS AND FIBROSIS OF SKIN: ICD-10-CM

## 2025-04-02 DIAGNOSIS — L76.82 AXILLARY WEB SYNDROME: Primary | ICD-10-CM

## 2025-04-02 DIAGNOSIS — L90.5 AXILLARY WEB SYNDROME: Primary | ICD-10-CM

## 2025-04-02 DIAGNOSIS — M25.611 DECREASED RANGE OF MOTION OF RIGHT SHOULDER: ICD-10-CM

## 2025-04-02 PROCEDURE — 97140 MANUAL THERAPY 1/> REGIONS: CPT | Mod: GP | Performed by: PHYSICAL THERAPIST

## 2025-04-02 PROCEDURE — 97112 NEUROMUSCULAR REEDUCATION: CPT | Mod: GP | Performed by: PHYSICAL THERAPIST

## 2025-04-02 PROCEDURE — 97110 THERAPEUTIC EXERCISES: CPT | Mod: GP | Performed by: PHYSICAL THERAPIST

## 2025-04-08 ENCOUNTER — THERAPY VISIT (OUTPATIENT)
Dept: PHYSICAL THERAPY | Facility: CLINIC | Age: 45
End: 2025-04-08
Payer: COMMERCIAL

## 2025-04-08 DIAGNOSIS — L76.82 AXILLARY WEB SYNDROME: Primary | ICD-10-CM

## 2025-04-08 DIAGNOSIS — I89.0 LYMPHEDEMA: ICD-10-CM

## 2025-04-08 DIAGNOSIS — M25.611 DECREASED RANGE OF MOTION OF RIGHT SHOULDER: ICD-10-CM

## 2025-04-08 DIAGNOSIS — R53.1 DECREASED STRENGTH: ICD-10-CM

## 2025-04-08 DIAGNOSIS — L90.5 SCAR CONDITIONS AND FIBROSIS OF SKIN: ICD-10-CM

## 2025-04-08 DIAGNOSIS — L90.5 AXILLARY WEB SYNDROME: Primary | ICD-10-CM

## 2025-04-08 DIAGNOSIS — R52 PAIN: ICD-10-CM

## 2025-04-08 PROCEDURE — 97112 NEUROMUSCULAR REEDUCATION: CPT | Mod: GP | Performed by: PHYSICAL THERAPIST

## 2025-04-08 PROCEDURE — 97110 THERAPEUTIC EXERCISES: CPT | Mod: GP | Performed by: PHYSICAL THERAPIST

## 2025-04-08 PROCEDURE — 97140 MANUAL THERAPY 1/> REGIONS: CPT | Mod: GP | Performed by: PHYSICAL THERAPIST

## 2025-04-15 ENCOUNTER — THERAPY VISIT (OUTPATIENT)
Dept: PHYSICAL THERAPY | Facility: CLINIC | Age: 45
End: 2025-04-15
Payer: COMMERCIAL

## 2025-04-15 DIAGNOSIS — R52 PAIN: ICD-10-CM

## 2025-04-15 DIAGNOSIS — L90.5 SCAR CONDITIONS AND FIBROSIS OF SKIN: ICD-10-CM

## 2025-04-15 DIAGNOSIS — I89.0 LYMPHEDEMA: ICD-10-CM

## 2025-04-15 DIAGNOSIS — R53.1 DECREASED STRENGTH: ICD-10-CM

## 2025-04-15 DIAGNOSIS — M25.611 DECREASED RANGE OF MOTION OF RIGHT SHOULDER: ICD-10-CM

## 2025-04-15 DIAGNOSIS — L76.82 AXILLARY WEB SYNDROME: Primary | ICD-10-CM

## 2025-04-15 DIAGNOSIS — L90.5 AXILLARY WEB SYNDROME: Primary | ICD-10-CM

## 2025-04-15 PROCEDURE — 97140 MANUAL THERAPY 1/> REGIONS: CPT | Mod: GP | Performed by: PHYSICAL THERAPIST

## 2025-04-15 PROCEDURE — 97110 THERAPEUTIC EXERCISES: CPT | Mod: GP | Performed by: PHYSICAL THERAPIST

## 2025-04-15 PROCEDURE — 97112 NEUROMUSCULAR REEDUCATION: CPT | Mod: GP | Performed by: PHYSICAL THERAPIST

## 2025-04-22 ENCOUNTER — THERAPY VISIT (OUTPATIENT)
Dept: PHYSICAL THERAPY | Facility: CLINIC | Age: 45
End: 2025-04-22
Payer: COMMERCIAL

## 2025-04-22 DIAGNOSIS — M25.611 DECREASED RANGE OF MOTION OF RIGHT SHOULDER: ICD-10-CM

## 2025-04-22 DIAGNOSIS — R53.1 DECREASED STRENGTH: ICD-10-CM

## 2025-04-22 DIAGNOSIS — L76.82 AXILLARY WEB SYNDROME: Primary | ICD-10-CM

## 2025-04-22 DIAGNOSIS — L90.5 AXILLARY WEB SYNDROME: Primary | ICD-10-CM

## 2025-04-22 DIAGNOSIS — I89.0 LYMPHEDEMA: ICD-10-CM

## 2025-04-22 DIAGNOSIS — M25.619 DECREASED RANGE OF MOTION (ROM) OF SHOULDER: ICD-10-CM

## 2025-04-22 DIAGNOSIS — R52 PAIN: ICD-10-CM

## 2025-04-22 DIAGNOSIS — L90.5 SCAR CONDITIONS AND FIBROSIS OF SKIN: ICD-10-CM

## 2025-04-22 PROCEDURE — 97112 NEUROMUSCULAR REEDUCATION: CPT | Mod: GP | Performed by: PHYSICAL THERAPIST

## 2025-04-22 PROCEDURE — 97140 MANUAL THERAPY 1/> REGIONS: CPT | Mod: GP | Performed by: PHYSICAL THERAPIST

## 2025-04-22 PROCEDURE — 97110 THERAPEUTIC EXERCISES: CPT | Mod: GP | Performed by: PHYSICAL THERAPIST

## 2025-05-07 ENCOUNTER — THERAPY VISIT (OUTPATIENT)
Dept: PHYSICAL THERAPY | Facility: CLINIC | Age: 45
End: 2025-05-07
Payer: COMMERCIAL

## 2025-05-07 DIAGNOSIS — L90.5 SCAR CONDITIONS AND FIBROSIS OF SKIN: ICD-10-CM

## 2025-05-07 DIAGNOSIS — L90.5 AXILLARY WEB SYNDROME: Primary | ICD-10-CM

## 2025-05-07 DIAGNOSIS — R52 PAIN: ICD-10-CM

## 2025-05-07 DIAGNOSIS — M25.611 DECREASED RANGE OF MOTION OF RIGHT SHOULDER: ICD-10-CM

## 2025-05-07 DIAGNOSIS — I89.0 LYMPHEDEMA: ICD-10-CM

## 2025-05-07 DIAGNOSIS — L76.82 AXILLARY WEB SYNDROME: Primary | ICD-10-CM

## 2025-05-07 DIAGNOSIS — R53.1 DECREASED STRENGTH: ICD-10-CM

## 2025-05-07 PROCEDURE — 97140 MANUAL THERAPY 1/> REGIONS: CPT | Mod: GP | Performed by: PHYSICAL THERAPIST

## 2025-05-07 PROCEDURE — 97110 THERAPEUTIC EXERCISES: CPT | Mod: GP | Performed by: PHYSICAL THERAPIST

## 2025-05-21 ENCOUNTER — THERAPY VISIT (OUTPATIENT)
Dept: PHYSICAL THERAPY | Facility: CLINIC | Age: 45
End: 2025-05-21
Payer: COMMERCIAL

## 2025-05-21 DIAGNOSIS — L90.5 AXILLARY WEB SYNDROME: Primary | ICD-10-CM

## 2025-05-21 DIAGNOSIS — L90.5 SCAR CONDITIONS AND FIBROSIS OF SKIN: ICD-10-CM

## 2025-05-21 DIAGNOSIS — R53.1 DECREASED STRENGTH: ICD-10-CM

## 2025-05-21 DIAGNOSIS — I89.0 LYMPHEDEMA: ICD-10-CM

## 2025-05-21 DIAGNOSIS — R52 PAIN: ICD-10-CM

## 2025-05-21 DIAGNOSIS — M25.611 DECREASED RANGE OF MOTION OF RIGHT SHOULDER: ICD-10-CM

## 2025-05-21 DIAGNOSIS — L76.82 AXILLARY WEB SYNDROME: Primary | ICD-10-CM

## 2025-05-21 PROCEDURE — 97140 MANUAL THERAPY 1/> REGIONS: CPT | Mod: GP | Performed by: PHYSICAL THERAPIST

## 2025-05-21 PROCEDURE — 97110 THERAPEUTIC EXERCISES: CPT | Mod: GP | Performed by: PHYSICAL THERAPIST

## 2025-06-04 ENCOUNTER — THERAPY VISIT (OUTPATIENT)
Dept: PHYSICAL THERAPY | Facility: CLINIC | Age: 45
End: 2025-06-04
Payer: COMMERCIAL

## 2025-06-04 DIAGNOSIS — L76.82 AXILLARY WEB SYNDROME: ICD-10-CM

## 2025-06-04 DIAGNOSIS — M25.619 DECREASED RANGE OF MOTION (ROM) OF SHOULDER: ICD-10-CM

## 2025-06-04 DIAGNOSIS — L90.5 SCAR CONDITIONS AND FIBROSIS OF SKIN: ICD-10-CM

## 2025-06-04 DIAGNOSIS — I89.0 LYMPHEDEMA: Primary | ICD-10-CM

## 2025-06-04 DIAGNOSIS — R53.1 DECREASED STRENGTH: ICD-10-CM

## 2025-06-04 DIAGNOSIS — M25.611 DECREASED RANGE OF MOTION OF RIGHT SHOULDER: ICD-10-CM

## 2025-06-04 DIAGNOSIS — L90.5 AXILLARY WEB SYNDROME: ICD-10-CM

## 2025-06-04 DIAGNOSIS — R52 PAIN: ICD-10-CM

## 2025-06-04 PROCEDURE — 97112 NEUROMUSCULAR REEDUCATION: CPT | Mod: GP | Performed by: PHYSICAL THERAPIST

## 2025-06-04 PROCEDURE — 97140 MANUAL THERAPY 1/> REGIONS: CPT | Mod: GP | Performed by: PHYSICAL THERAPIST

## 2025-06-04 PROCEDURE — 97110 THERAPEUTIC EXERCISES: CPT | Mod: GP | Performed by: PHYSICAL THERAPIST

## 2025-06-18 ENCOUNTER — THERAPY VISIT (OUTPATIENT)
Dept: PHYSICAL THERAPY | Facility: CLINIC | Age: 45
End: 2025-06-18
Payer: COMMERCIAL

## 2025-06-18 DIAGNOSIS — L90.5 SCAR CONDITIONS AND FIBROSIS OF SKIN: ICD-10-CM

## 2025-06-18 DIAGNOSIS — L76.82 AXILLARY WEB SYNDROME: ICD-10-CM

## 2025-06-18 DIAGNOSIS — I89.0 LYMPHEDEMA: Primary | ICD-10-CM

## 2025-06-18 DIAGNOSIS — R52 PAIN: ICD-10-CM

## 2025-06-18 DIAGNOSIS — R53.1 DECREASED STRENGTH: ICD-10-CM

## 2025-06-18 DIAGNOSIS — M25.611 DECREASED RANGE OF MOTION OF RIGHT SHOULDER: ICD-10-CM

## 2025-06-18 DIAGNOSIS — L90.5 AXILLARY WEB SYNDROME: ICD-10-CM

## 2025-06-18 PROCEDURE — 97110 THERAPEUTIC EXERCISES: CPT | Mod: GP | Performed by: PHYSICAL THERAPIST

## 2025-06-18 PROCEDURE — 97140 MANUAL THERAPY 1/> REGIONS: CPT | Mod: GP | Performed by: PHYSICAL THERAPIST

## 2025-06-18 NOTE — PROGRESS NOTES
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 Reed); only DCIS in both breasts no indication for any additional adjuvant therapy, csections (2020,2017,2014, excision benign lesion L trunk and benign tongue lesion; R thumb tendon repair 2022; does report tendency for \"puffy hands and feet during summer\" ; s/p 3 fills to date; obesity with BMI = 45, HTN( risk factors : R SLND, BMI, tendency for generalized edema ie during summers)   Progress Note/Certification   Onset of illness/injury or Date of Surgery 02/12/25  (B mastectomies and R SLND;  date of order = 3/13/2025 Yennifer Levine MD ([facility]))   Therapy Frequency 1x/week x 6 weeks then 2x/month   Predicted Duration 3 months   Progress Note Completed Date 03/19/25   PT Goal 1   Goal Identifier Home program   Goal Description Patient will be independent in home program to manage condition of cancer related decreased shoulder ROM, strength and axillary web syndrome as well as in lymphedema risk reduction behaviors   Rationale to maximize safety and independence with performance of ADLs and functional tasks;to maximize safety and independence within the home;to maximize safety and independence within the community;to maximize safety and independence " with transportation;to maximize safety and independence with self cares   Target Date 07/30/25  (goal date extended to allow safe progression of exercises prior to surgery 8/1/2025)   PT Goal 2   Goal Identifier spadi   Goal Description Patient will demonstrate an 8 point decrease on the Spadi demonstrating a significant improvement in both pain and function.   Rationale to maximize safety and independence with performance of ADLs and functional tasks;to maximize safety and independence within the home;to maximize safety and independence within the community;to maximize safety and independence with transportation;to maximize safety and independence with self cares   Goal Progress eval = 35% impairement; 6/4/2025 = 10.8%   Target Date 05/18/25   Date Met 06/04/25   PT Goal 3   Goal Identifier FACIT   Goal Description Pt to report decreased overall cancer related fatigue by scoring at least 40/52 on FACIT   Rationale to maximize safety and independence with performance of ADLs and functional tasks;to maximize safety and independence within the home;to maximize safety and independence within the community;to maximize safety and independence with self cares   Goal Progress eval = 36/52; 6/4/2025 = 43/52;   Target Date 05/18/25   Date Met 06/04/25   Subjective Report   Subjective Report to dept with new spanx compression tank   Objective Measure 1   Objective Measure seated shoulder flexion   Details beginning of session B = 160; (L increaesd 20 degrees;  ; R = 160 (increased 35  degrees); stable status   Objective Measure 2   Objective Measure  strength   Objective Measure 3   Objective Measure spadi   Details pain = 8/50; disability = 6/80; 14/130=10.8%   Objective Measure 4   Objective Measure FACIT   Details 43/52; >40 demonstrates fatigue WNL   Objective Measure 5   Objective Measure edema   Details mild fulllness thorughout L UE >R UE; and R lateral trunk >L trunk   Objective Measure 6   Objective Measure  volume to 40cm   Details R decreased 2.7%; L decreased .3%; L>R = .8%; L handed   Therapeutic Procedure/Exercise   Therapeutic Procedures: strength, endurance, ROM, flexibility minutes (28211) 25   Ther Proc 1 stretching, strengthening, aerobic ex.   Ther Proc 1 - Details trialing various UE stretching and opting to instruct in standing 1 arm pectoral stretch for B UEs providing both vcs and demosntration and issued updated HEP; further instruction in safely progressing strength after breast cancer home exercise program and providing vcs on how to prioritize aerobic ex (walking program, at least 1 good UE/trunk stretch daily and strengtheinng of scapular stabilizers and core 2x/week with goal of 150' moderate intensity aerobic ex, strength training of all major mm groups 2x/week.  also instructing pt in  post op circulation exs for surgery 8/1/2025 with emphasis on clearing any restrictions/precautions with surgeon. and issued written instructions   Skilled Intervention progressing with HEP   Patient Response/Progress progressing well with modifications to HEP   Manual Therapy   Manual Therapy: Mobilization, MFR, MLD, friction massage minutes (64949) 30   Manual Therapy 1 scar massage, MFR/ STM/ MLD, R & L  axillary cording flossing ,garments   Manual Therapy 1 - Details assessing new spanx tank demonstrating good compression extending into axillas and educating in option to consider lateral bra swell spot for R lateral trunk swelling but pt wishing to wait until next session to assess need.assessed R mastectomy scar noting significant imrpovement in healing of area of superfical opening..  perfomring gentle MFR B UE/UQ including tissue bending and space correction techniques.  continues to demonstrate area of L axillary cording but overall imrpoving status and continues to demonstrate improved mobility of R UE.  perfomring MLD trunk with emphasis on clearing thorugh R axillo inguinal pathway with improving  lymphatic clearing with manual technqiues and further isntruction in benefit of self lymphatic clearing before bed.   Patient Response/Progress progressing with functional mobilty and home progam   Education   Learner/Method Patient   Education Comments  [place]   Plan   Home program supine R supported shoulder flexion with LTR stretch, seated HBH rotation and sidebending 3 reps 1x/day, standing wall slides/angels 3 reps 1x/day, R UE axillary cord flossing (d/c'd 4/15); scaption with 1-2 lbs weights  2x/week x 10-15 reps up to 2 sets; HBB chest stretch daily, wall push ups x 10 every other day;  get up and move during day every  hour with 1 extended walk, sensory retraining, increased body awarenss and posture; bicep curls (3 or 5 lbs), tricep extension (3 or 5lbs), partial abdominal curls, bridging  x 10 reps x 2 sets  every other day; standing 1 arm pectoral stretch   Updates to plan of care goals 2 and 3 met progressing with goal 1   Plan for next session plan to see in about 1 month to safely progress HEP prior to surgery 8/1/2025; remains low risk of chronic lymphedema; assess current exs (walking, scaption, core prioritizing)   Comments   Comments Exercise: walking during lunch or with kids 10'-20; free weights (1lb, 5lbs, 10lbs)'   Total Session Time   Timed Code Treatment Minutes 55   Total Treatment Time (sum of timed and untimed services) 55         PLAN  Continue therapy per current plan of care.    Beginning/End Dates of Progress Note Reporting Period:  03/19/25 to 06/18/2025    Referring Provider:  Yennifer Mix

## 2025-07-24 ENCOUNTER — THERAPY VISIT (OUTPATIENT)
Dept: PHYSICAL THERAPY | Facility: CLINIC | Age: 45
End: 2025-07-24
Payer: COMMERCIAL

## 2025-07-24 DIAGNOSIS — M25.611 DECREASED RANGE OF MOTION OF RIGHT SHOULDER: ICD-10-CM

## 2025-07-24 DIAGNOSIS — R52 PAIN: ICD-10-CM

## 2025-07-24 DIAGNOSIS — R53.1 DECREASED STRENGTH: ICD-10-CM

## 2025-07-24 DIAGNOSIS — L90.5 SCAR CONDITIONS AND FIBROSIS OF SKIN: ICD-10-CM

## 2025-07-24 DIAGNOSIS — I89.0 LYMPHEDEMA: Primary | ICD-10-CM

## 2025-07-24 DIAGNOSIS — L90.5 AXILLARY WEB SYNDROME: ICD-10-CM

## 2025-07-24 DIAGNOSIS — L76.82 AXILLARY WEB SYNDROME: ICD-10-CM

## 2025-07-24 NOTE — PROGRESS NOTES
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 Reed); only DCIS in both breasts no indication for any additional adjuvant therapy, csections (2020,2017,2014, excision benign lesion L trunk and benign tongue lesion; R thumb tendon repair 2022; does report tendency for \"puffy hands and feet during summer\" ; s/p 3 fills to date; obesity with BMI = 45, HTN( risk factors : R SLND, BMI, tendency for generalized edema ie during summers)   Progress Note/Certification   Onset of illness/injury or Date of Surgery 02/12/25  (B mastectomies and R SLND;  date of order = 3/13/2025 Yennifer Levine MD ([facility]))   Therapy Frequency 1x/week x 6 weeks then 2x/month   Predicted Duration 3 months   Progress Note Completed Date 06/18/25   PT Goal 1   Goal Identifier Home program   Goal Description Patient will be independent in home program to manage condition of cancer related decreased shoulder ROM, strength and axillary web syndrome as well as in lymphedema risk reduction behaviors   Rationale to maximize safety and independence with performance of ADLs and functional tasks;to maximize safety and independence within the home;to maximize safety and independence within the community;to maximize safety and independence " with transportation;to maximize safety and independence with self cares   Goal Progress MET   Target Date 07/30/25  (goal date extended to allow safe progression of exercises prior to surgery 8/1/2025)   Date Met 07/24/25   PT Goal 2   Goal Identifier spadi   Goal Description Patient will demonstrate an 8 point decrease on the Spadi demonstrating a significant improvement in both pain and function.   Rationale to maximize safety and independence with performance of ADLs and functional tasks;to maximize safety and independence within the home;to maximize safety and independence within the community;to maximize safety and independence with transportation;to maximize safety and independence with self cares   Goal Progress eval = 35% impairement; 6/4/2025 = 10.8%   Target Date 05/18/25   Date Met 06/04/25   PT Goal 3   Goal Identifier FACIT   Goal Description Pt to report decreased overall cancer related fatigue by scoring at least 40/52 on FACIT   Rationale to maximize safety and independence with performance of ADLs and functional tasks;to maximize safety and independence within the home;to maximize safety and independence within the community;to maximize safety and independence with self cares   Goal Progress eval = 36/52; 6/4/2025 = 43/52;   Target Date 05/18/25   Date Met 06/04/25   Subjective Report   Subjective Report to dept reporting overall good manament of post mastectomy symptoms.   Objective Measure 1   Objective Measure seated shoulder flexion   Details B = 160; (L increased 20 degrees;  ; R = 160 (increased 35  degrees); stable status   Objective Measure 3   Objective Measure spadi   Details pain = 8/50; disability = 6/80; 14/130=10.8% (decreased 24.2 points)   Objective Measure 4   Objective Measure FACIT   Details 43/52 (increased 7 points); >40 demonstrates fatigue WNL   Objective Measure 5   Objective Measure edema   Details mild fulllness thorughout L UE >R UE; and R lateral trunk >L trunk    Objective Measure 6   Objective Measure volume to 40cm   Details R = 3091ml decreased 5.7% from initial ; L = 3213 = stable status; L>R = 4%; L handed   Therapeutic Procedure/Exercise   Therapeutic Procedures: strength, endurance, ROM, flexibility minutes (39977) 15   Ther Proc 1 stretching, strengthening, aerobic ex.   Ther Proc 1 - Details continues to note tightness of L>R UQ / chest wall and shoulder but does not limit ADLs;  continues with UE/Utrunk stretching and assessing and proving min vcs to optimize technique for standing 1 arm pectoral stretch  and supine B shoulder flexion stretch with LTR especially onL and assessing scaption with 5lbs x 5 reps laternating with soup cans x 10-15 reps. x 2 sets 2x/week . also instructing further in post op circulation exs including shoulder rolls, elbow , wrist , fingers flexion / extension x 5 reps several times/day post implant exchange and slowly resume B shoulder ROM with gentle end stretch once cleared as well as resuming prior HEP including aerobifc ex  with goal of 150'/week of moderate intensity and strength training of all major mm groups 10-15 reps 2x/week   Skilled Intervention progressing with HEP   Patient Response/Progress progressing well with modifications to HEP   Neuromuscular Re-education   Neuromuscular re-ed of mvmt, balance, coord, kinesthetic sense, posture, proprioception minutes (92013) 10   Neuro Re-ed 1 posture, increased body awareness   Neuro Re-ed 1 - Details further instruction in increased body awareness thorughout day to optimize posture and mm balance especially following next surgery to minimize potential for guarding.   Patient Response/Progress verbalized understanding   Manual Therapy   Manual Therapy: Mobilization, MFR, MLD, friction massage minutes (83850) 25   Manual Therapy 1 volume, MFR/ STM/ MLD, R & L  axillary cording flossing ,garments   Manual Therapy 1 - Details assessed edema and volume with details in objective  measures; wearing spanx like tank 2 days/week. instructing pt to follow up with surgeon when able to resume wearing following implant exchange; assessing axillary cording noting continued mild cording L and perfomring STM/MFR including tissue bending techniques followed by MLD trunk and B UEs clearing centrally and furtehr instructed in selflymphatic clearing techniques with emphasis on self LN stimulation and clearing UE centrally and along B ipsilateeral axillo inguinal pathways   Patient Response/Progress independent in home program; stable status   Education   Learner/Method Patient   Education Comments  [place]   Plan   Home program supine R supported shoulder flexion with LTR stretch, seated HBH rotation and sidebending 3 reps 1x/day, standing wall slides/angels 3 reps 1x/day, R UE axillary cord flossing (d/c'd 4/15); scaption with 1-2 lbs weights  2x/week x 10-15 reps up to 2 sets; HBB chest stretch daily, wall push ups x 10 every other day;  get up and move during day every  hour with 1 extended walk, sensory retraining, increased body awarenss and posture; bicep curls (3 or 5 lbs), tricep extension (3 or 5lbs), partial abdominal curls, bridging  x 10 reps x 2 sets  every other day; standing 1 arm pectoral stretch   Updates to plan of care all goals met   Plan for next session d/c to home program   Comments   Comments Exercise: walking during lunch or with kids 10'-20; free weights (1lb, 5lbs, 10lbs)'   Total Session Time   Timed Code Treatment Minutes 50   Total Treatment Time (sum of timed and untimed services) 50         DISCHARGE  Reason for Discharge: Patient has met all goals.      Discharge Plan: Patient to continue home program.    Referring Provider:  Yennifer Mix

## 2025-07-29 ENCOUNTER — OFFICE VISIT (OUTPATIENT)
Dept: FAMILY MEDICINE | Facility: CLINIC | Age: 45
End: 2025-07-29
Payer: COMMERCIAL

## 2025-07-29 VITALS
DIASTOLIC BLOOD PRESSURE: 77 MMHG | TEMPERATURE: 97.8 F | OXYGEN SATURATION: 98 % | HEART RATE: 71 BPM | SYSTOLIC BLOOD PRESSURE: 119 MMHG | WEIGHT: 253 LBS | RESPIRATION RATE: 18 BRPM | BODY MASS INDEX: 46.27 KG/M2

## 2025-07-29 DIAGNOSIS — C50.911 BILATERAL MALIGNANT NEOPLASM OF BREAST IN FEMALE, ESTROGEN RECEPTOR POSITIVE, UNSPECIFIED SITE OF BREAST (H): ICD-10-CM

## 2025-07-29 DIAGNOSIS — Z42.1 ENCOUNTER FOR BREAST RECONSTRUCTION FOLLOWING MASTECTOMY: ICD-10-CM

## 2025-07-29 DIAGNOSIS — Z17.0 BILATERAL MALIGNANT NEOPLASM OF BREAST IN FEMALE, ESTROGEN RECEPTOR POSITIVE, UNSPECIFIED SITE OF BREAST (H): ICD-10-CM

## 2025-07-29 DIAGNOSIS — C50.912 BILATERAL MALIGNANT NEOPLASM OF BREAST IN FEMALE, ESTROGEN RECEPTOR POSITIVE, UNSPECIFIED SITE OF BREAST (H): ICD-10-CM

## 2025-07-29 DIAGNOSIS — Z01.818 PREOP GENERAL PHYSICAL EXAM: Primary | ICD-10-CM

## 2025-07-29 DIAGNOSIS — I10 ESSENTIAL HYPERTENSION: ICD-10-CM

## 2025-07-29 LAB
ANION GAP SERPL CALCULATED.3IONS-SCNC: 14 MMOL/L (ref 7–15)
BUN SERPL-MCNC: 13.7 MG/DL (ref 6–20)
CALCIUM SERPL-MCNC: 9.6 MG/DL (ref 8.8–10.4)
CHLORIDE SERPL-SCNC: 102 MMOL/L (ref 98–107)
CREAT SERPL-MCNC: 0.78 MG/DL (ref 0.51–0.95)
EGFRCR SERPLBLD CKD-EPI 2021: >90 ML/MIN/1.73M2
GLUCOSE SERPL-MCNC: 96 MG/DL (ref 70–99)
HCO3 SERPL-SCNC: 24 MMOL/L (ref 22–29)
HGB BLD-MCNC: 13 G/DL (ref 11.7–15.7)
MCV RBC AUTO: 80 FL (ref 78–100)
POTASSIUM SERPL-SCNC: 3.6 MMOL/L (ref 3.4–5.3)
SODIUM SERPL-SCNC: 140 MMOL/L (ref 135–145)

## 2025-07-29 PROCEDURE — 3074F SYST BP LT 130 MM HG: CPT | Performed by: FAMILY MEDICINE

## 2025-07-29 PROCEDURE — 3078F DIAST BP <80 MM HG: CPT | Performed by: FAMILY MEDICINE

## 2025-07-29 PROCEDURE — 80048 BASIC METABOLIC PNL TOTAL CA: CPT | Performed by: FAMILY MEDICINE

## 2025-07-29 PROCEDURE — 36415 COLL VENOUS BLD VENIPUNCTURE: CPT | Performed by: FAMILY MEDICINE

## 2025-07-29 PROCEDURE — 85018 HEMOGLOBIN: CPT | Performed by: FAMILY MEDICINE

## 2025-07-29 PROCEDURE — 99214 OFFICE O/P EST MOD 30 MIN: CPT | Performed by: FAMILY MEDICINE

## 2025-07-29 ASSESSMENT — ANXIETY QUESTIONNAIRES
5. BEING SO RESTLESS THAT IT IS HARD TO SIT STILL: SEVERAL DAYS
GAD7 TOTAL SCORE: 7
7. FEELING AFRAID AS IF SOMETHING AWFUL MIGHT HAPPEN: NOT AT ALL
GAD7 TOTAL SCORE: 7
8. IF YOU CHECKED OFF ANY PROBLEMS, HOW DIFFICULT HAVE THESE MADE IT FOR YOU TO DO YOUR WORK, TAKE CARE OF THINGS AT HOME, OR GET ALONG WITH OTHER PEOPLE?: SOMEWHAT DIFFICULT
1. FEELING NERVOUS, ANXIOUS, OR ON EDGE: SEVERAL DAYS
3. WORRYING TOO MUCH ABOUT DIFFERENT THINGS: SEVERAL DAYS
GAD7 TOTAL SCORE: 7
4. TROUBLE RELAXING: SEVERAL DAYS
IF YOU CHECKED OFF ANY PROBLEMS ON THIS QUESTIONNAIRE, HOW DIFFICULT HAVE THESE PROBLEMS MADE IT FOR YOU TO DO YOUR WORK, TAKE CARE OF THINGS AT HOME, OR GET ALONG WITH OTHER PEOPLE: SOMEWHAT DIFFICULT
2. NOT BEING ABLE TO STOP OR CONTROL WORRYING: SEVERAL DAYS
6. BECOMING EASILY ANNOYED OR IRRITABLE: MORE THAN HALF THE DAYS
7. FEELING AFRAID AS IF SOMETHING AWFUL MIGHT HAPPEN: NOT AT ALL

## 2025-07-29 ASSESSMENT — PATIENT HEALTH QUESTIONNAIRE - PHQ9
SUM OF ALL RESPONSES TO PHQ QUESTIONS 1-9: 7
SUM OF ALL RESPONSES TO PHQ QUESTIONS 1-9: 7
10. IF YOU CHECKED OFF ANY PROBLEMS, HOW DIFFICULT HAVE THESE PROBLEMS MADE IT FOR YOU TO DO YOUR WORK, TAKE CARE OF THINGS AT HOME, OR GET ALONG WITH OTHER PEOPLE: SOMEWHAT DIFFICULT

## 2025-07-29 NOTE — PATIENT INSTRUCTIONS
How to Take Your Medication Before Surgery  Preoperative Medication Instructions   Antiplatelet or Anticoagulation Medication Instructions   - We reviewed the medication list and the patient is not on an antiplatelet or anticoagulation medications.    Additional Medication Instructions   - Diuretics (furosemide, hydrochlorothiazide, chlorothalidone): DO NOT TAKE on the day of surgery.       Patient Education   Preparing for Your Surgery  For Adults  Getting started  In most cases, a nurse will call to review your health history and instructions. They will give you an arrival time based on your scheduled surgery time. Please be ready to share:  Your doctor's clinic name and phone number  Your medical, surgical, and anesthesia history  A list of allergies and sensitivities  A list of medicines, including herbal treatments and over-the-counter drugs  Whether the patient has a legal guardian (ask how to send us the papers in advance)  Note: You may not receive a call if you were seen at our PAC (Preoperative Assessment Center).  Please tell us if you're pregnant--or if there's any chance you might be pregnant. Some surgeries may injure a fetus (unborn baby), so they require a pregnancy test. Surgeries that are safe for a fetus don't always need a test, and you can choose whether to have one.   Preparing for surgery  Within 10 to 30 days of surgery: Have a pre-op exam (sometimes called an H&P, or History and Physical). This can be done at a clinic or pre-operative center.  If you're having a , you may not need this exam. Talk to your care team.  At your pre-op exam, talk to your care team about all medicines you take. (This includes CBD oil and any drugs, such as THC, marijuana, and other forms of cannabis.) If you need to stop any medicine before surgery, ask when to start taking it again.  This is for your safety. Many medicines and drugs can make you bleed too much during surgery. Some change how well surgery  (anesthesia) drugs work.  Call your insurance company to let them know you're having surgery. (If you don't have insurance, call [phone].)  Call your clinic if there's any change in your health. This includes a scrape or scratch near the surgery site, or any signs of a cold (sore throat, runny nose, cough, rash, fever).  Eating and drinking guidelines  For your safety: Unless your surgeon tells you otherwise, follow the guidelines below.  Eat and drink as normal until 8 hours before you arrive for surgery. After that, no food or milk. You can spit out gum when you arrive.  Drink clear liquids until 2 hours before you arrive. These are liquids you can see through, like water, Gatorade, and Propel Water. They also include plain black coffee and tea (no cream or milk).  No alcohol for 24 hours before you arrive. The night before surgery, stop any drinks that contain THC.  If your care team tells you to take medicine on the morning of surgery, it's okay to take it with a sip of water. No other medicines or drugs are allowed (including CBD oil)--follow your care team's instructions.  If you have questions the day of surgery, call your hospital or surgery center.   Preventing infection  Shower or bathe the night before and the morning of surgery. Follow the instructions your clinic gave you. (If no instructions, use regular soap.)  Don't shave or clip hair near your surgery site. We'll remove the hair if needed.  Don't smoke or vape the morning of surgery. No chewing tobacco for 6 hours before you arrive. A nicotine patch is okay. You may spit out nicotine gum when you arrive.  For some surgeries, the surgeon will tell you to fully quit smoking and nicotine.  We will make every effort to keep you safe from infection. We will:  Clean our hands often with soap and water (or an alcohol-based hand rub).  Clean the skin at your surgery site with a special soap that kills germs.  Give you a special gown to keep you warm.  (Cold raises the risk of infection.)  Wear hair covers, masks, gowns, and gloves during surgery.  Give antibiotic medicine, if prescribed. Not all surgeries need this medicine.  What to bring on the day of surgery  Photo ID and insurance card  Copy of your health care directive, if you have one  Glasses and hearing aids (bring cases)  You can't wear contacts during surgery  Inhaler and eye drops, if you use them (tell us about these when you arrive)  CPAP machine or breathing device, if you use them  A few personal items, if spending the night  If you have . . .  A pacemaker, ICD (cardiac defibrillator), or other implant: Bring the ID card.  An implanted stimulator: Bring the remote control.  A legal guardian: Bring a copy of the certified (court-stamped) guardianship papers.  Please remove any jewelry, including body piercings. Leave jewelry and other valuables at home.  If you're going home the day of surgery  You must have a support person drive you home. They should stay with you overnight, and they may need to help with your self-care.  If you don't have a support person, please tells us as soon as possible. We can help.  After surgery  If it's hard to control your pain or you need more pain medicine, please call your surgeon's office.  Questions?   If you have any questions for your care team, list them here:   ____________________________________________________________________________________________________________________________________________________________________________________________________________________________________________________________  For informational purposes only. Not to replace the advice of your health care provider. Copyright   2003, 2019 [facility]. All rights reserved. Clinically reviewed by Alvino Campos MD. [employer] [id number] - REV 02/25.

## 2025-07-29 NOTE — PROGRESS NOTES
Preoperative Evaluation  [facility]  [address], [address]  [facility] [address]  Phone: [phone]  Primary Provider: Naida Elliott DO  Pre-op Performing Provider: Naida Elliott DO  Jul 29, 2025 7/29/2025   Surgical Information   What procedure is being done? Reconstruction surgery   Facility or Hospital where procedure/surgery will be performed: [facility]   Who is doing the procedure / surgery? Dr Reed   Date of surgery / procedure: 8/1/25   Time of surgery / procedure: 7:30am   Where do you plan to recover after surgery? at home with family     Fax number for surgical facility: Note does not need to be faxed, will be available electronically in Epic.    Assessment & Plan     The proposed surgical procedure is considered LOW risk.    Preop general physical exam     - Basic metabolic panel  (Ca, Cl, CO2, Creat, Gluc, K, Na, BUN); Future  - Hemoglobin; Future    Bilateral malignant neoplasm of breast in female, estrogen receptor positive, unspecified site of breast (H)     - Basic metabolic panel  (Ca, Cl, CO2, Creat, Gluc, K, Na, BUN); Future  - Hemoglobin; Future    Encounter for breast reconstruction following mastectomy     - Basic metabolic panel  (Ca, Cl, CO2, Creat, Gluc, K, Na, BUN); Future  - Hemoglobin; Future    Essential hypertension    Risks and Recommendations  The patient has the following additional risks and recommendations for perioperative complications:   - Allergy to skin  - possibly betadine  Will wash with antibacterial soap prior    Preoperative Medication Instructions  Antiplatelet or Anticoagulation Medication Instructions   - We reviewed the medication list and the patient is not on an antiplatelet or anticoagulation medications.    Additional Medication Instructions   - Diuretics (furosemide, hydrochlorothiazide, chlorothalidone): DO NOT TAKE on the day of surgery.    Recommendation  Approval given to proceed with proposed  procedure, without further diagnostic evaluation.        Zay Bui is a 45 year old, presenting for the following:  Pre-Op Exam          7/29/2025     8:54 AM   Additional Questions   Roomed by Jacqueline HOPKINS MA   Accompanied by self         7/29/2025     8:54 AM   Patient Reported Additional Medications   Patient reports taking the following new medications none     HPI: breast reconstruction          7/29/2025   Pre-Op Questionnaire   Have you ever had a heart attack or stroke? No   Have you ever had surgery on your heart or blood vessels, such as a stent placement, a coronary artery bypass, or surgery on an artery in your head, neck, heart, or legs? No   Do you have chest pain with activity? No   Do you have a history of heart failure? No   Do you currently have a cold, bronchitis or symptoms of other infection? No   Do you have a cough, shortness of breath, or wheezing? No   Do you or anyone in your family have previous history of blood clots? No   Do you or does anyone in your family have a serious bleeding problem such as prolonged bleeding following surgeries or cuts? No   Have you ever had problems with anemia or been told to take iron pills? No   Have you had any abnormal blood loss such as black, tarry or bloody stools, or abnormal vaginal bleeding? No   Have you ever had a blood transfusion? No   Are you willing to have a blood transfusion if it is medically needed before, during, or after your surgery? Yes   Have you or any of your relatives ever had problems with anesthesia? No   Do you have sleep apnea, excessive snoring or daytime drowsiness? No   Do you have any artifical heart valves or other implanted medical devices like a pacemaker, defibrillator, or continuous glucose monitor? No   Do you have artificial joints? No   Are you allergic to latex? No     Advance Care Planning        Preoperative Review of    reviewed - last prescription after surgery            Patient Active Problem List     Diagnosis Date Noted    Essential hypertension 2025     Priority: Medium    Post-operative state 2025     Priority: Medium    Breast neoplasm, Tis (DCIS), right 2024     Priority: Medium    Morbid obesity (H) 10/08/2021     Priority: Medium      Past Medical History:   Diagnosis Date    Abnormal Pap smear         Cancer (H) 27    Essential hypertension 2025    LSIL (low grade squamous intraepithelial lesion) on Pap smear 2011    colp - [facility]     Past Surgical History:   Procedure Laterality Date    BIOPSY       SECTION N/A 2014    Procedure:  SECTION;  Surgeon: Maribel Nixon MD;  Location: [facility]     SECTION N/A 2017    Procedure:  SECTION;  Surgeon: Diane Anderson MD;  Location: [facility]     SECTION N/A 2020    Procedure: REPEAT  SECTION;  Surgeon: Yadi Ricci MD;  Location: [facility]    INSERT TISSUE EXPANDER BREAST BILATERAL Bilateral 2025    Procedure: Insertion tissue expander, breasts, bilateral;  Surgeon: Dalila Reed MD;  Location: [facility] OR    MASTECTOMY SIMPLE, SENTINEL NODE, COMBINED Bilateral 2025    Procedure: bilateral mastectomies with right sentinel node biopsy and excision left breast lipoma, removal of left chest mass;  Surgeon: Yennifer Mix MD;  Location:  OR    ZZHC EXC LESION OF TONGUE W/O CLOSURE      benign     Current Outpatient Medications   Medication Sig Dispense Refill    triamterene-HCTZ (DYAZIDE) 37.5-25 MG capsule Take 1 capsule by mouth every morning. 90 capsule 3       Allergies   Allergen Reactions    Cats     Pollen Extract      Ragweed - Fall  Usually also bothered for a week in the spring.    Antiseptic Skin Cleanser [Chlorhexidine Gluconate] Rash     Diffuse rash after  (second child) and mastectomy    Betadine [Povidone Iodine] Rash     Post operative rash x2 -        Social History     Tobacco Use    Smoking status:  "Never     Passive exposure: Never    Smokeless tobacco: Never   Substance Use Topics    Alcohol use: Yes     Comment: 2 times or less per week       History   Drug Use Unknown             Review of Systems  Constitutional, HEENT, cardiovascular, pulmonary, GI, , musculoskeletal, neuro, skin, endocrine and psych systems are negative, except as otherwise noted.    Objective    /77 (BP Location: Left arm, Patient Position: Sitting, Cuff Size: Adult Large)   Pulse 71   Temp 97.8  F (36.6  C) (Temporal)   Resp 18   Wt 114.8 kg (253 lb)   LMP 07/07/2025   SpO2 98%   BMI 46.27 kg/m     Estimated body mass index is 46.27 kg/m  as calculated from the following:    Height as of 2/12/25: 1.575 m (5' 2\").    Weight as of this encounter: 114.8 kg (253 lb).  Physical Exam  GENERAL: alert and no distress  HENT: ear canals and TM's normal, nose and mouth without ulcers or lesions  NECK: no adenopathy, no asymmetry, masses, or scars  RESP: lungs clear to auscultation - no rales, rhonchi or wheezes  CV: regular rate and rhythm, normal S1 S2, no S3 or S4, no murmur, click or rub, no peripheral edema  ABDOMEN: soft, nontender, no hepatosplenomegaly, no masses and bowel sounds normal  MS: no gross musculoskeletal defects noted, no edema  SKIN: no suspicious lesions or rashes  NEURO: Normal strength and tone, mentation intact and speech normal  PSYCH: mentation appears normal, affect normal/bright    Recent Labs   Lab Test 12/20/24  1148 11/20/24  1127   HGB  --  12.3   PLT  --  312    141   POTASSIUM 3.9 4.4   CR 0.79 0.76        Diagnostics  Recent Results (from the past 24 hours)   Basic metabolic panel  (Ca, Cl, CO2, Creat, Gluc, K, Na, BUN)    Collection Time: 07/29/25  9:33 AM   Result Value Ref Range    Sodium 140 135 - 145 mmol/L    Potassium 3.6 3.4 - 5.3 mmol/L    Chloride 102 98 - 107 mmol/L    Carbon Dioxide (CO2) 24 22 - 29 mmol/L    Anion Gap 14 7 - 15 mmol/L    Urea Nitrogen 13.7 6.0 - 20.0 mg/dL    " Creatinine 0.78 0.51 - 0.95 mg/dL    GFR Estimate >90 >60 mL/min/1.73m2    Calcium 9.6 8.8 - 10.4 mg/dL    Glucose 96 70 - 99 mg/dL   Hemoglobin    Collection Time: 07/29/25  9:33 AM   Result Value Ref Range    Hemoglobin 13.0 11.7 - 15.7 g/dL    MCV 80 78 - 100 fL      No EKG required for low risk surgery (cataract, skin procedure, breast biopsy, etc).    Revised Cardiac Risk Index (RCRI)  The patient has the following serious cardiovascular risks for perioperative complications:   - No serious cardiac risks = 0 points     RCRI Interpretation: 0 points: Class I (very low risk - 0.4% complication rate)         Signed Electronically by: Naida Elliott DO  A copy of this evaluation report is provided to the requesting physician.         Answers submitted by the patient for this visit:  Patient Health Questionnaire (Submitted on 7/29/2025)  If you checked off any problems, how difficult have these problems made it for you to do your work, take care of things at home, or get along with other people?: Somewhat difficult  PHQ9 TOTAL SCORE: 7  Patient Health Questionnaire (G7) (Submitted on 7/29/2025)  ERIKA 7 TOTAL SCORE: 7

## 2025-08-01 ENCOUNTER — HOSPITAL ENCOUNTER (OUTPATIENT)
Facility: CLINIC | Age: 45
Discharge: HOME | End: 2025-08-01
Attending: PLASTIC SURGERY | Admitting: PLASTIC SURGERY
Payer: COMMERCIAL

## 2025-08-01 VITALS
OXYGEN SATURATION: 98 % | SYSTOLIC BLOOD PRESSURE: 118 MMHG | HEIGHT: 61 IN | DIASTOLIC BLOOD PRESSURE: 80 MMHG | BODY MASS INDEX: 48.8 KG/M2 | TEMPERATURE: 97 F | WEIGHT: 258.5 LBS | HEART RATE: 59 BPM | RESPIRATION RATE: 14 BRPM

## 2025-08-01 LAB — HCG UR QL: NEGATIVE

## 2025-08-01 PROCEDURE — 250N000011 HC RX IP 250 OP 636: Performed by: STUDENT IN AN ORGANIZED HEALTH CARE EDUCATION/TRAINING PROGRAM

## 2025-08-01 PROCEDURE — 272N000001 HC OR GENERAL SUPPLY STERILE: Performed by: PLASTIC SURGERY

## 2025-08-01 PROCEDURE — 360N000077 HC SURGERY LEVEL 4, PER MIN: Performed by: PLASTIC SURGERY

## 2025-08-01 PROCEDURE — 999N000141 HC STATISTIC PRE-PROCEDURE NURSING ASSESSMENT: Performed by: PLASTIC SURGERY

## 2025-08-01 PROCEDURE — 258N000003 HC RX IP 258 OP 636: Performed by: PLASTIC SURGERY

## 2025-08-01 PROCEDURE — 250N000013 HC RX MED GY IP 250 OP 250 PS 637: Performed by: STUDENT IN AN ORGANIZED HEALTH CARE EDUCATION/TRAINING PROGRAM

## 2025-08-01 PROCEDURE — 710N000012 HC RECOVERY PHASE 2, PER MINUTE: Performed by: PLASTIC SURGERY

## 2025-08-01 PROCEDURE — 88305 TISSUE EXAM BY PATHOLOGIST: CPT | Mod: TC | Performed by: PLASTIC SURGERY

## 2025-08-01 PROCEDURE — 81025 URINE PREGNANCY TEST: CPT | Performed by: STUDENT IN AN ORGANIZED HEALTH CARE EDUCATION/TRAINING PROGRAM

## 2025-08-01 PROCEDURE — 370N000017 HC ANESTHESIA TECHNICAL FEE, PER MIN: Performed by: PLASTIC SURGERY

## 2025-08-01 PROCEDURE — L8600 IMPLANT BREAST SILICONE/EQ: HCPCS | Performed by: PLASTIC SURGERY

## 2025-08-01 PROCEDURE — 250N000025 HC SEVOFLURANE, PER MIN: Performed by: PLASTIC SURGERY

## 2025-08-01 PROCEDURE — 250N000009 HC RX 250: Performed by: PLASTIC SURGERY

## 2025-08-01 PROCEDURE — 88305 TISSUE EXAM BY PATHOLOGIST: CPT | Mod: 26 | Performed by: PATHOLOGY

## 2025-08-01 PROCEDURE — 250N000013 HC RX MED GY IP 250 OP 250 PS 637: Performed by: PLASTIC SURGERY

## 2025-08-01 PROCEDURE — 250N000011 HC RX IP 250 OP 636: Performed by: PLASTIC SURGERY

## 2025-08-01 PROCEDURE — 710N000009 HC RECOVERY PHASE 1, LEVEL 1, PER MIN: Performed by: PLASTIC SURGERY

## 2025-08-01 DEVICE — IMPLANTABLE DEVICE: Type: IMPLANTABLE DEVICE | Site: BREAST | Status: FUNCTIONAL

## 2025-08-01 RX ORDER — FENTANYL CITRATE 50 UG/ML
25 INJECTION, SOLUTION INTRAMUSCULAR; INTRAVENOUS EVERY 5 MIN PRN
Status: DISCONTINUED | OUTPATIENT
Start: 2025-08-01 | End: 2025-08-01 | Stop reason: HOSPADM

## 2025-08-01 RX ORDER — MAGNESIUM HYDROXIDE 1200 MG/15ML
LIQUID ORAL PRN
Status: DISCONTINUED | OUTPATIENT
Start: 2025-08-01 | End: 2025-08-01 | Stop reason: HOSPADM

## 2025-08-01 RX ORDER — BUPIVACAINE HYDROCHLORIDE 2.5 MG/ML
INJECTION, SOLUTION INFILTRATION; PERINEURAL PRN
Status: DISCONTINUED | OUTPATIENT
Start: 2025-08-01 | End: 2025-08-01 | Stop reason: HOSPADM

## 2025-08-01 RX ORDER — SODIUM CHLORIDE, SODIUM LACTATE, POTASSIUM CHLORIDE, CALCIUM CHLORIDE 600; 310; 30; 20 MG/100ML; MG/100ML; MG/100ML; MG/100ML
INJECTION, SOLUTION INTRAVENOUS CONTINUOUS
Status: DISCONTINUED | OUTPATIENT
Start: 2025-08-01 | End: 2025-08-01 | Stop reason: HOSPADM

## 2025-08-01 RX ORDER — CEFAZOLIN SODIUM/WATER 2 G/20 ML
2 SYRINGE (ML) INTRAVENOUS SEE ADMIN INSTRUCTIONS
Status: DISCONTINUED | OUTPATIENT
Start: 2025-08-01 | End: 2025-08-01 | Stop reason: HOSPADM

## 2025-08-01 RX ORDER — ONDANSETRON 4 MG/1
4 TABLET, ORALLY DISINTEGRATING ORAL EVERY 30 MIN PRN
Status: DISCONTINUED | OUTPATIENT
Start: 2025-08-01 | End: 2025-08-01 | Stop reason: HOSPADM

## 2025-08-01 RX ORDER — BUPIVACAINE HYDROCHLORIDE 2.5 MG/ML
INJECTION, SOLUTION EPIDURAL; INFILTRATION; INTRACAUDAL; PERINEURAL
Status: DISCONTINUED
Start: 2025-08-01 | End: 2025-08-01 | Stop reason: HOSPADM

## 2025-08-01 RX ORDER — CEFAZOLIN SODIUM/WATER 2 G/20 ML
2 SYRINGE (ML) INTRAVENOUS
Status: COMPLETED | OUTPATIENT
Start: 2025-08-01 | End: 2025-08-01

## 2025-08-01 RX ORDER — OXYCODONE HYDROCHLORIDE 5 MG/1
5 TABLET ORAL ONCE
Refills: 0 | Status: COMPLETED | OUTPATIENT
Start: 2025-08-01 | End: 2025-08-01

## 2025-08-01 RX ORDER — NALOXONE HYDROCHLORIDE 0.4 MG/ML
0.1 INJECTION, SOLUTION INTRAMUSCULAR; INTRAVENOUS; SUBCUTANEOUS
Status: DISCONTINUED | OUTPATIENT
Start: 2025-08-01 | End: 2025-08-01 | Stop reason: HOSPADM

## 2025-08-01 RX ORDER — ONDANSETRON 2 MG/ML
4 INJECTION INTRAMUSCULAR; INTRAVENOUS EVERY 30 MIN PRN
Status: DISCONTINUED | OUTPATIENT
Start: 2025-08-01 | End: 2025-08-01 | Stop reason: HOSPADM

## 2025-08-01 RX ORDER — LABETALOL HYDROCHLORIDE 5 MG/ML
10 INJECTION, SOLUTION INTRAVENOUS
Status: DISCONTINUED | OUTPATIENT
Start: 2025-08-01 | End: 2025-08-01 | Stop reason: HOSPADM

## 2025-08-01 RX ORDER — DEXAMETHASONE SODIUM PHOSPHATE 4 MG/ML
4 INJECTION, SOLUTION INTRA-ARTICULAR; INTRALESIONAL; INTRAMUSCULAR; INTRAVENOUS; SOFT TISSUE
Status: DISCONTINUED | OUTPATIENT
Start: 2025-08-01 | End: 2025-08-01 | Stop reason: HOSPADM

## 2025-08-01 RX ORDER — FENTANYL CITRATE 50 UG/ML
50 INJECTION, SOLUTION INTRAMUSCULAR; INTRAVENOUS EVERY 5 MIN PRN
Status: DISCONTINUED | OUTPATIENT
Start: 2025-08-01 | End: 2025-08-01 | Stop reason: HOSPADM

## 2025-08-01 RX ORDER — HYDROMORPHONE HCL IN WATER/PF 6 MG/30 ML
0.4 PATIENT CONTROLLED ANALGESIA SYRINGE INTRAVENOUS EVERY 5 MIN PRN
Status: DISCONTINUED | OUTPATIENT
Start: 2025-08-01 | End: 2025-08-01 | Stop reason: HOSPADM

## 2025-08-01 RX ORDER — ACETAMINOPHEN 500 MG
1000 TABLET ORAL ONCE
Status: COMPLETED | OUTPATIENT
Start: 2025-08-01 | End: 2025-08-01

## 2025-08-01 RX ORDER — HYDROMORPHONE HCL IN WATER/PF 6 MG/30 ML
0.2 PATIENT CONTROLLED ANALGESIA SYRINGE INTRAVENOUS EVERY 5 MIN PRN
Status: DISCONTINUED | OUTPATIENT
Start: 2025-08-01 | End: 2025-08-01 | Stop reason: HOSPADM

## 2025-08-01 RX ADMIN — FENTANYL CITRATE 50 MCG: 50 INJECTION, SOLUTION INTRAMUSCULAR; INTRAVENOUS at 09:56

## 2025-08-01 RX ADMIN — FENTANYL CITRATE 50 MCG: 50 INJECTION, SOLUTION INTRAMUSCULAR; INTRAVENOUS at 09:31

## 2025-08-01 RX ADMIN — OXYCODONE HYDROCHLORIDE 5 MG: 5 TABLET ORAL at 10:17

## 2025-08-01 RX ADMIN — ACETAMINOPHEN 1000 MG: 500 TABLET, FILM COATED ORAL at 06:32

## 2025-08-01 ASSESSMENT — ACTIVITIES OF DAILY LIVING (ADL)
ADLS_ACUITY_SCORE: 50
ADLS_ACUITY_SCORE: 51
ADLS_ACUITY_SCORE: 50

## 2025-08-12 ENCOUNTER — PREP FOR PROCEDURE (OUTPATIENT)
Dept: SURGERY | Facility: CLINIC | Age: 45
End: 2025-08-12
Payer: COMMERCIAL

## 2025-08-12 DIAGNOSIS — N65.0 DEFORMITY AND DISPROPORTION OF RECONSTRUCTED BREAST: Primary | ICD-10-CM

## 2025-08-12 DIAGNOSIS — N65.1 DEFORMITY AND DISPROPORTION OF RECONSTRUCTED BREAST: Primary | ICD-10-CM

## (undated) DEVICE — PITCHER STERILE 1000ML  SSK9004A

## (undated) DEVICE — SOL WATER IRRIG 1000ML BOTTLE 2F7114

## (undated) DEVICE — SU VICRYL 3-0 SH 27" J316H

## (undated) DEVICE — STPL SKIN 35W ROTATING HEAD PRW35

## (undated) DEVICE — SOL NACL 0.9% IRRIG 1000ML BOTTLE 07138-09

## (undated) DEVICE — DRAPE BREAST/CHEST 29420

## (undated) DEVICE — GLOVE PROTEXIS BLUE W/NEU-THERA 6.5  2D73EB65

## (undated) DEVICE — SOL WATER IRRIG 1000ML BOTTLE 07139-09

## (undated) DEVICE — SU MONOCRYL 4-0 PS-2 18" UND Y496G

## (undated) DEVICE — DRSG KERLIX 4 1/2"X4YDS ROLL 6715

## (undated) DEVICE — SU MONOCRYL 3-0 PS-2 27" Y427H

## (undated) DEVICE — DRSG GAUZE 4X4" 3033

## (undated) DEVICE — CLEANSER WOUND IRRISEPT 0.05% CHG IRRISEPT-403

## (undated) DEVICE — BLADE KNIFE SURG 10 371110

## (undated) DEVICE — TUBE SUCTION MB ULTRA-LIMP ASPIRATION 9FTX29.5IN 24-5104-00

## (undated) DEVICE — SU PDS II 3-0 SH 27" Z316H

## (undated) DEVICE — STPL SKIN 35W 6.9MM  PXW35

## (undated) DEVICE — PACK C-SECTION LF PL15OTA83B

## (undated) DEVICE — SU TIE VICRYL+ 3-0 12X18IN VIO VCP104G

## (undated) DEVICE — SPONGE LAP 18X18" X8435

## (undated) DEVICE — SU VICRYL 0 CT-1 36" J346H

## (undated) DEVICE — PAD CHUX UNDERPAD 23X24" 7136

## (undated) DEVICE — CLIP ETHICON LIGACLIP SM BLUE LT100

## (undated) DEVICE — BASIN SET MAJOR

## (undated) DEVICE — SU SILK 2-0 FSL 18" 677G

## (undated) DEVICE — SU ETHILON 3-0 FS-1 18" 669H

## (undated) DEVICE — SOLUTION WOUND VASHE BOTTLE 16 FL OZ. (475 ML)  00317

## (undated) DEVICE — PEN MARKING SKIN

## (undated) DEVICE — STRAP KNEE/BODY 31143004

## (undated) DEVICE — ESU PENCIL W/HOLSTER

## (undated) DEVICE — PREP CHLORAPREP 26ML TINTED HI-LITE ORANGE 930815

## (undated) DEVICE — ESU PENCIL W/HOLSTER E2350H

## (undated) DEVICE — DRAIN JACKSON PRATT RESERVOIR 100ML SU130-1305

## (undated) DEVICE — DRAIN JACKSON PRATT 15FR ROUND SU130-1323

## (undated) DEVICE — GLOVE BIOGEL PI MICRO SZ 6.5 48565

## (undated) DEVICE — STOCKING SLEEVE COMPRESSION CALF MED

## (undated) DEVICE — SU PDS II 2-0 CT-1 27" Z339H

## (undated) DEVICE — SOL NACL 0.9% INJ 1000ML BAG 2B1324X

## (undated) DEVICE — SOL NACL 0.9% IRRIG 1000ML BOTTLE 2F7124

## (undated) DEVICE — SU MONOCRYL 0 CTB-1 36" YB946

## (undated) DEVICE — PACK MAJOR SBA15MAFSI

## (undated) DEVICE — SU DERMABOND ADVANCED .7ML DNX12

## (undated) DEVICE — KIT TURNOVER FAIRVIEW SOUTHDALE FULL SP3889

## (undated) DEVICE — DRAPE IOBAN INCISE 23X17" 6650EZ

## (undated) DEVICE — CATH TRAY FOLEY 16FR BARDEX W/DRAIN BAG STATLOCK 300316A

## (undated) DEVICE — LINEN TOWEL PACK X5 5464

## (undated) DEVICE — ESU GROUND PAD UNIVERSAL W/O CORD

## (undated) DEVICE — SUCTION CANISTER MEDIVAC LINER 1500ML W/LID 65651-515

## (undated) DEVICE — DRSG XEROFORM 5X9" 8884431605

## (undated) DEVICE — GLOVE BIOGEL PI MICRO SZ 6.0 48560

## (undated) DEVICE — BARRIER INTERCEED 5X6" 4350XL

## (undated) DEVICE — SLEEVE PROTECTIVE BREAST BIOPSY  GMSLV001-10

## (undated) DEVICE — BLADE KNIFE SURG 15 371115

## (undated) DEVICE — DRSG ABDOMINAL 07 1/2X8" 7197D

## (undated) DEVICE — STOCKING SLEEVE COMPRESSION CALF LG

## (undated) DEVICE — SYR BULB IRRIG DOVER 60 ML LATEX FREE 67000

## (undated) DEVICE — SU PLAIN 0 TIE 54" S104H

## (undated) DEVICE — DRAPE IOBAN C-SECTION W/POUCH 30X35" 6657

## (undated) DEVICE — PREP CHLORAPREP 26ML TINTED ORANGE  260815

## (undated) DEVICE — SU ETHILON 5-0 P-3 18" BLACK 698G

## (undated) DEVICE — GLOVE ESTEEM POWDER FREE SMT 6.5  2D72PT65

## (undated) DEVICE — Device

## (undated) DEVICE — BARRIER SEPRAFILM 5X6" SINGLE SHEET 4301-02

## (undated) DEVICE — CATH TRAY FOLEY 16FR SILICONE 907416

## (undated) DEVICE — CLEANSER JET LAVAGE IRRISEPT 0.05% CHG IRRISEPT45USA

## (undated) DEVICE — SU PLAIN 3-0 CTX 27" 873H

## (undated) DEVICE — CLEANSER WOUND DEBRIDEMENT VASHE 250ML 00313

## (undated) DEVICE — ESU ELEC BLADE 2.75" COATED/INSULATED E1455

## (undated) DEVICE — GLOVE ESTEEM POWDER FREE SMT 6.0  2D72PT60

## (undated) DEVICE — SUCTION MANIFOLD NEPTUNE 2 SYS 4 PORT 0702-020-000

## (undated) DEVICE — BAG DECANTER STERILE WHITE DYNJDEC09

## (undated) DEVICE — NDL 19GA 1.5"

## (undated) DEVICE — SU VICRYL 3-0 PS-2 27" UND J427H

## (undated) DEVICE — GLOVE BIOGEL PI MICRO INDICATOR UNDERGLOVE SZ 6.5 48965

## (undated) RX ORDER — ONDANSETRON 2 MG/ML
INJECTION INTRAMUSCULAR; INTRAVENOUS
Status: DISPENSED
Start: 2025-08-01

## (undated) RX ORDER — CEFAZOLIN SODIUM/WATER 2 G/20 ML
SYRINGE (ML) INTRAVENOUS
Status: DISPENSED
Start: 2025-08-01

## (undated) RX ORDER — KETOROLAC TROMETHAMINE 30 MG/ML
INJECTION, SOLUTION INTRAMUSCULAR; INTRAVENOUS
Status: DISPENSED
Start: 2020-01-27

## (undated) RX ORDER — PROPOFOL 10 MG/ML
INJECTION, EMULSION INTRAVENOUS
Status: DISPENSED
Start: 2025-08-01

## (undated) RX ORDER — FENTANYL CITRATE 0.05 MG/ML
INJECTION, SOLUTION INTRAMUSCULAR; INTRAVENOUS
Status: DISPENSED
Start: 2025-08-01

## (undated) RX ORDER — EPHEDRINE SULFATE 50 MG/ML
INJECTION, SOLUTION INTRAMUSCULAR; INTRAVENOUS; SUBCUTANEOUS
Status: DISPENSED
Start: 2020-01-27

## (undated) RX ORDER — PROPOFOL 10 MG/ML
INJECTION, EMULSION INTRAVENOUS
Status: DISPENSED
Start: 2025-02-12

## (undated) RX ORDER — OXYCODONE HYDROCHLORIDE 5 MG/1
TABLET ORAL
Status: DISPENSED
Start: 2025-08-01

## (undated) RX ORDER — FENTANYL CITRATE 0.05 MG/ML
INJECTION, SOLUTION INTRAMUSCULAR; INTRAVENOUS
Status: DISPENSED
Start: 2025-02-12

## (undated) RX ORDER — FENTANYL CITRATE 50 UG/ML
INJECTION, SOLUTION INTRAMUSCULAR; INTRAVENOUS
Status: DISPENSED
Start: 2025-02-12

## (undated) RX ORDER — FENTANYL CITRATE 50 UG/ML
INJECTION, SOLUTION INTRAMUSCULAR; INTRAVENOUS
Status: DISPENSED
Start: 2020-01-27

## (undated) RX ORDER — HYDROMORPHONE HYDROCHLORIDE 1 MG/ML
INJECTION, SOLUTION INTRAMUSCULAR; INTRAVENOUS; SUBCUTANEOUS
Status: DISPENSED
Start: 2025-02-12

## (undated) RX ORDER — OXYTOCIN/0.9 % SODIUM CHLORIDE 30/500 ML
PLASTIC BAG, INJECTION (ML) INTRAVENOUS
Status: DISPENSED
Start: 2020-01-27

## (undated) RX ORDER — DEXAMETHASONE SODIUM PHOSPHATE 4 MG/ML
INJECTION, SOLUTION INTRA-ARTICULAR; INTRALESIONAL; INTRAMUSCULAR; INTRAVENOUS; SOFT TISSUE
Status: DISPENSED
Start: 2025-08-01

## (undated) RX ORDER — ACETAMINOPHEN 325 MG/1
TABLET ORAL
Status: DISPENSED
Start: 2025-02-12

## (undated) RX ORDER — ONDANSETRON 2 MG/ML
INJECTION INTRAMUSCULAR; INTRAVENOUS
Status: DISPENSED
Start: 2020-01-27

## (undated) RX ORDER — ACETAMINOPHEN 500 MG
TABLET ORAL
Status: DISPENSED
Start: 2025-08-01

## (undated) RX ORDER — FENTANYL CITRATE 50 UG/ML
INJECTION, SOLUTION INTRAMUSCULAR; INTRAVENOUS
Status: DISPENSED
Start: 2025-08-01

## (undated) RX ORDER — MORPHINE SULFATE 1 MG/ML
INJECTION, SOLUTION EPIDURAL; INTRATHECAL; INTRAVENOUS
Status: DISPENSED
Start: 2020-01-27

## (undated) RX ORDER — HYDROMORPHONE HYDROCHLORIDE 1 MG/ML
INJECTION, SOLUTION INTRAMUSCULAR; INTRAVENOUS; SUBCUTANEOUS
Status: DISPENSED
Start: 2025-08-01